# Patient Record
Sex: MALE | Race: WHITE | NOT HISPANIC OR LATINO | Employment: FULL TIME | URBAN - NONMETROPOLITAN AREA
[De-identification: names, ages, dates, MRNs, and addresses within clinical notes are randomized per-mention and may not be internally consistent; named-entity substitution may affect disease eponyms.]

---

## 2018-12-31 ENCOUNTER — APPOINTMENT (EMERGENCY)
Dept: RADIOLOGY | Facility: HOSPITAL | Age: 43
End: 2018-12-31
Payer: COMMERCIAL

## 2018-12-31 ENCOUNTER — HOSPITAL ENCOUNTER (EMERGENCY)
Facility: HOSPITAL | Age: 43
End: 2018-12-31
Attending: EMERGENCY MEDICINE | Admitting: EMERGENCY MEDICINE
Payer: COMMERCIAL

## 2018-12-31 ENCOUNTER — APPOINTMENT (EMERGENCY)
Dept: CT IMAGING | Facility: HOSPITAL | Age: 43
End: 2018-12-31
Payer: COMMERCIAL

## 2018-12-31 ENCOUNTER — HOSPITAL ENCOUNTER (INPATIENT)
Facility: HOSPITAL | Age: 43
LOS: 3 days | Discharge: HOME | DRG: 045 | End: 2019-01-03
Attending: INTERNAL MEDICINE | Admitting: INTERNAL MEDICINE
Payer: COMMERCIAL

## 2018-12-31 VITALS
DIASTOLIC BLOOD PRESSURE: 108 MMHG | HEART RATE: 80 BPM | RESPIRATION RATE: 18 BRPM | SYSTOLIC BLOOD PRESSURE: 156 MMHG | WEIGHT: 202.6 LBS | TEMPERATURE: 97.8 F | OXYGEN SATURATION: 95 %

## 2018-12-31 DIAGNOSIS — Z72.0 TOBACCO ABUSE: ICD-10-CM

## 2018-12-31 DIAGNOSIS — I63.9 STROKE (HCC): Primary | ICD-10-CM

## 2018-12-31 DIAGNOSIS — I63.9 ISCHEMIC STROKE (HCC): ICD-10-CM

## 2018-12-31 DIAGNOSIS — R29.898 UPPER EXTREMITY WEAKNESS: ICD-10-CM

## 2018-12-31 DIAGNOSIS — R47.1 DYSARTHRIA: Primary | ICD-10-CM

## 2018-12-31 PROBLEM — F10.10 ALCOHOL ABUSE: Status: ACTIVE | Noted: 2018-12-31

## 2018-12-31 PROBLEM — K21.9 GASTROESOPHAGEAL REFLUX DISEASE WITHOUT ESOPHAGITIS: Status: ACTIVE | Noted: 2018-12-31

## 2018-12-31 PROBLEM — G45.9 TIA (TRANSIENT ISCHEMIC ATTACK): Status: ACTIVE | Noted: 2018-12-31

## 2018-12-31 LAB
ABO GROUP BLD: NORMAL
ANION GAP BLD CALC-SCNC: 19 MMOL/L (ref 4–13)
ANION GAP SERPL CALCULATED.3IONS-SCNC: 9 MMOL/L (ref 4–13)
APTT PPP: 26 SECONDS (ref 26–38)
BASOPHILS # BLD AUTO: 0.06 THOUSANDS/ΜL (ref 0–0.1)
BASOPHILS NFR BLD AUTO: 1 % (ref 0–1)
BLD GP AB SCN SERPL QL: NEGATIVE
BUN BLD-MCNC: 10 MG/DL (ref 5–25)
BUN SERPL-MCNC: 12 MG/DL (ref 5–25)
CA-I BLD-SCNC: 1.22 MMOL/L (ref 1.12–1.32)
CALCIUM SERPL-MCNC: 8.9 MG/DL (ref 8.3–10.1)
CHLORIDE BLD-SCNC: 106 MMOL/L (ref 100–108)
CHLORIDE SERPL-SCNC: 107 MMOL/L (ref 100–108)
CO2 SERPL-SCNC: 24 MMOL/L (ref 21–32)
CREAT BLD-MCNC: 1.3 MG/DL (ref 0.6–1.3)
CREAT SERPL-MCNC: 1.35 MG/DL (ref 0.6–1.3)
EOSINOPHIL # BLD AUTO: 0.11 THOUSAND/ΜL (ref 0–0.61)
EOSINOPHIL NFR BLD AUTO: 1 % (ref 0–6)
ERYTHROCYTE [DISTWIDTH] IN BLOOD BY AUTOMATED COUNT: 13.2 % (ref 11.6–15.1)
GFR SERPL CREATININE-BSD FRML MDRD: 64 ML/MIN/1.73SQ M
GFR SERPL CREATININE-BSD FRML MDRD: 67 ML/MIN/1.73SQ M
GLUCOSE SERPL-MCNC: 106 MG/DL (ref 65–140)
GLUCOSE SERPL-MCNC: 107 MG/DL (ref 65–140)
GLUCOSE SERPL-MCNC: 93 MG/DL (ref 65–140)
HCT VFR BLD AUTO: 48.1 % (ref 36.5–49.3)
HCT VFR BLD CALC: 48 % (ref 36.5–49.3)
HGB BLD-MCNC: 16.6 G/DL (ref 12–17)
HGB BLDA-MCNC: 16.3 G/DL (ref 12–17)
IMM GRANULOCYTES # BLD AUTO: 0.02 THOUSAND/UL (ref 0–0.2)
IMM GRANULOCYTES NFR BLD AUTO: 0 % (ref 0–2)
INR PPP: 1.07 (ref 0.86–1.17)
LYMPHOCYTES # BLD AUTO: 2 THOUSANDS/ΜL (ref 0.6–4.47)
LYMPHOCYTES NFR BLD AUTO: 21 % (ref 14–44)
MAGNESIUM SERPL-MCNC: 1.9 MG/DL (ref 1.6–2.6)
MCH RBC QN AUTO: 31.4 PG (ref 26.8–34.3)
MCHC RBC AUTO-ENTMCNC: 34.5 G/DL (ref 31.4–37.4)
MCV RBC AUTO: 91 FL (ref 82–98)
MONOCYTES # BLD AUTO: 0.77 THOUSAND/ΜL (ref 0.17–1.22)
MONOCYTES NFR BLD AUTO: 8 % (ref 4–12)
NEUTROPHILS # BLD AUTO: 6.53 THOUSANDS/ΜL (ref 1.85–7.62)
NEUTS SEG NFR BLD AUTO: 69 % (ref 43–75)
NRBC BLD AUTO-RTO: 0 /100 WBCS
PCO2 BLD: 22 MMOL/L (ref 21–32)
PHOSPHATE SERPL-MCNC: 2.3 MG/DL (ref 2.7–4.5)
PLATELET # BLD AUTO: 209 THOUSANDS/UL (ref 149–390)
PMV BLD AUTO: 9.9 FL (ref 8.9–12.7)
POTASSIUM BLD-SCNC: 3.9 MMOL/L (ref 3.5–5.3)
POTASSIUM SERPL-SCNC: 3.9 MMOL/L (ref 3.5–5.3)
PROTHROMBIN TIME: 13.4 SECONDS (ref 11.8–14.2)
RBC # BLD AUTO: 5.28 MILLION/UL (ref 3.88–5.62)
RH BLD: POSITIVE
SODIUM BLD-SCNC: 142 MMOL/L (ref 136–145)
SODIUM SERPL-SCNC: 140 MMOL/L (ref 136–145)
SPECIMEN EXPIRATION DATE: NORMAL
SPECIMEN SOURCE: ABNORMAL
T4 FREE SERPL-MCNC: 1.32 NG/DL (ref 0.76–1.46)
TROPONIN I SERPL-MCNC: 0.02 NG/ML
TSH SERPL DL<=0.05 MIU/L-ACNC: 0.37 UIU/ML (ref 0.36–3.74)
WBC # BLD AUTO: 9.49 THOUSAND/UL (ref 4.31–10.16)

## 2018-12-31 PROCEDURE — 80048 BASIC METABOLIC PNL TOTAL CA: CPT | Performed by: EMERGENCY MEDICINE

## 2018-12-31 PROCEDURE — 86901 BLOOD TYPING SEROLOGIC RH(D): CPT | Performed by: EMERGENCY MEDICINE

## 2018-12-31 PROCEDURE — 70498 CT ANGIOGRAPHY NECK: CPT

## 2018-12-31 PROCEDURE — 82948 REAGENT STRIP/BLOOD GLUCOSE: CPT

## 2018-12-31 PROCEDURE — 85610 PROTHROMBIN TIME: CPT | Performed by: EMERGENCY MEDICINE

## 2018-12-31 PROCEDURE — 84484 ASSAY OF TROPONIN QUANT: CPT | Performed by: EMERGENCY MEDICINE

## 2018-12-31 PROCEDURE — 82272 OCCULT BLD FECES 1-3 TESTS: CPT

## 2018-12-31 PROCEDURE — 99245 OFF/OP CONSLTJ NEW/EST HI 55: CPT | Performed by: PSYCHIATRY & NEUROLOGY

## 2018-12-31 PROCEDURE — 86850 RBC ANTIBODY SCREEN: CPT | Performed by: EMERGENCY MEDICINE

## 2018-12-31 PROCEDURE — 96374 THER/PROPH/DIAG INJ IV PUSH: CPT

## 2018-12-31 PROCEDURE — 84439 ASSAY OF FREE THYROXINE: CPT | Performed by: EMERGENCY MEDICINE

## 2018-12-31 PROCEDURE — 84100 ASSAY OF PHOSPHORUS: CPT | Performed by: EMERGENCY MEDICINE

## 2018-12-31 PROCEDURE — 99285 EMERGENCY DEPT VISIT HI MDM: CPT

## 2018-12-31 PROCEDURE — 70496 CT ANGIOGRAPHY HEAD: CPT

## 2018-12-31 PROCEDURE — 71045 X-RAY EXAM CHEST 1 VIEW: CPT

## 2018-12-31 PROCEDURE — 85730 THROMBOPLASTIN TIME PARTIAL: CPT | Performed by: EMERGENCY MEDICINE

## 2018-12-31 PROCEDURE — 85014 HEMATOCRIT: CPT

## 2018-12-31 PROCEDURE — 86900 BLOOD TYPING SEROLOGIC ABO: CPT | Performed by: EMERGENCY MEDICINE

## 2018-12-31 PROCEDURE — 84443 ASSAY THYROID STIM HORMONE: CPT | Performed by: EMERGENCY MEDICINE

## 2018-12-31 PROCEDURE — 70450 CT HEAD/BRAIN W/O DYE: CPT

## 2018-12-31 PROCEDURE — 93005 ELECTROCARDIOGRAM TRACING: CPT

## 2018-12-31 PROCEDURE — 99291 CRITICAL CARE FIRST HOUR: CPT | Performed by: NURSE PRACTITIONER

## 2018-12-31 PROCEDURE — 80047 BASIC METABLC PNL IONIZED CA: CPT

## 2018-12-31 PROCEDURE — 85025 COMPLETE CBC W/AUTO DIFF WBC: CPT | Performed by: EMERGENCY MEDICINE

## 2018-12-31 PROCEDURE — 83735 ASSAY OF MAGNESIUM: CPT | Performed by: EMERGENCY MEDICINE

## 2018-12-31 RX ORDER — ATORVASTATIN CALCIUM 40 MG/1
40 TABLET, FILM COATED ORAL EVERY EVENING
Status: DISCONTINUED | OUTPATIENT
Start: 2018-12-31 | End: 2019-01-03

## 2018-12-31 RX ORDER — NICOTINE 21 MG/24HR
1 PATCH, TRANSDERMAL 24 HOURS TRANSDERMAL DAILY
Status: DISCONTINUED | OUTPATIENT
Start: 2019-01-01 | End: 2019-01-03 | Stop reason: HOSPADM

## 2018-12-31 RX ORDER — SENNOSIDES 8.6 MG
1 TABLET ORAL DAILY
Status: DISCONTINUED | OUTPATIENT
Start: 2019-01-01 | End: 2019-01-03 | Stop reason: HOSPADM

## 2018-12-31 RX ORDER — PANTOPRAZOLE SODIUM 40 MG/1
40 TABLET, DELAYED RELEASE ORAL
Status: DISCONTINUED | OUTPATIENT
Start: 2019-01-01 | End: 2019-01-03 | Stop reason: HOSPADM

## 2018-12-31 RX ORDER — THIAMINE MONONITRATE (VIT B1) 100 MG
100 TABLET ORAL DAILY
Status: DISCONTINUED | OUTPATIENT
Start: 2019-01-01 | End: 2019-01-03 | Stop reason: HOSPADM

## 2018-12-31 RX ORDER — LABETALOL HYDROCHLORIDE 5 MG/ML
10 INJECTION, SOLUTION INTRAVENOUS ONCE
Status: COMPLETED | OUTPATIENT
Start: 2018-12-31 | End: 2018-12-31

## 2018-12-31 RX ORDER — DOCUSATE SODIUM 100 MG/1
100 CAPSULE, LIQUID FILLED ORAL 2 TIMES DAILY
Status: DISCONTINUED | OUTPATIENT
Start: 2018-12-31 | End: 2019-01-03 | Stop reason: HOSPADM

## 2018-12-31 RX ORDER — OMEPRAZOLE 20 MG/1
20 CAPSULE, DELAYED RELEASE ORAL DAILY
COMMUNITY

## 2018-12-31 RX ORDER — LABETALOL HYDROCHLORIDE 5 MG/ML
INJECTION, SOLUTION INTRAVENOUS
Status: COMPLETED
Start: 2018-12-31 | End: 2018-12-31

## 2018-12-31 RX ORDER — SODIUM CHLORIDE 9 MG/ML
50 INJECTION, SOLUTION INTRAVENOUS CONTINUOUS
Status: DISCONTINUED | OUTPATIENT
Start: 2018-12-31 | End: 2019-01-01

## 2018-12-31 RX ORDER — FOLIC ACID 1 MG/1
1 TABLET ORAL DAILY
Status: DISCONTINUED | OUTPATIENT
Start: 2019-01-01 | End: 2019-01-03 | Stop reason: HOSPADM

## 2018-12-31 RX ORDER — LABETALOL HYDROCHLORIDE 5 MG/ML
10 INJECTION, SOLUTION INTRAVENOUS ONCE
Status: DISCONTINUED | OUTPATIENT
Start: 2018-12-31 | End: 2018-12-31 | Stop reason: HOSPADM

## 2018-12-31 RX ADMIN — SODIUM CHLORIDE 50 ML/HR: 0.9 INJECTION, SOLUTION INTRAVENOUS at 20:37

## 2018-12-31 RX ADMIN — ALTEPLASE 8.3 MG: KIT at 14:55

## 2018-12-31 RX ADMIN — LABETALOL HYDROCHLORIDE 10 MG: 5 INJECTION, SOLUTION INTRAVENOUS at 15:00

## 2018-12-31 RX ADMIN — ALTEPLASE 74.4 MG: KIT at 14:55

## 2018-12-31 RX ADMIN — IOHEXOL 100 ML: 350 INJECTION, SOLUTION INTRAVENOUS at 14:28

## 2018-12-31 RX ADMIN — LABETALOL HYDROCHLORIDE 10 MG: 5 INJECTION, SOLUTION INTRAVENOUS at 14:38

## 2018-12-31 NOTE — ED PROVIDER NOTES
History  Chief Complaint   Patient presents with    Slurred Speech     slurred speech with left sided numbness and heaviness at 1000 this am  went away, slurred speech came back in ambulance on way here  hx of TIAs  Carly Gillespie STROKE Alert     36 y/o male with hx TIA 7 yr prior consisting of dysarthria and left upper extremity/left lower extremity weakness presents now with onset of dysarthria/left perioral facial droop/left upper+left lower extremity weakness beginning at 1330 today  Had similar episode in same distribution at 1000 today lasting approximately 45 minutes with complete resolution and subsequently at 1245 today lasting approximately 15 minutes with full resolution  Patient also reports similar episode occurring approximately 2 weeks ago in which he had approximately 20 minutes episode of left upper extremity weakness that also resolved  States he was not supposed to be on any medications following his previous TIA  States he does not take any medications presently including any anticoagulant medications  States that he did fall while ascending stairs at approximately 1030 this morning due to weakness in the left lower extremity but denies striking his head or losing consciousness  He describes it as tripping and denies head/chest/abdominal injury from fall  Otherwise denies headache/blurred vision/double vision/PERRLA nausea/vomiting/chest pain/dyspnea/extremity paresthesias/vertigo/syncope  Did take aspirin 81 mg at 1130 this morning d/t sx  Patient seen immediately upon arrival; upon my evaluation, it is obvious that he is having active neurologic symptoms with a defined neurologic distribution  He was activated as a stroke alert  Will obtain emergent consultation with neurologist and emergent CT head/CTA head plus neck  History provided by:  EMS personnel, patient and significant other      Prior to Admission Medications   Prescriptions Last Dose Informant Patient Reported? Taking? omeprazole (PriLOSEC) 20 mg delayed release capsule  Self Yes Yes   Sig: Take 20 mg by mouth daily      Facility-Administered Medications: None       Past Medical History:   Diagnosis Date    GERD (gastroesophageal reflux disease)     TIA (transient ischemic attack)        History reviewed  No pertinent surgical history  History reviewed  No pertinent family history  I have reviewed and agree with the history as documented  Social History   Substance Use Topics    Smoking status: Current Every Day Smoker     Packs/day: 2 00     Types: Cigarettes    Smokeless tobacco: Never Used    Alcohol use Yes        Review of Systems   Constitutional: Negative for chills, fatigue and fever  Eyes: Negative for pain and visual disturbance  Respiratory: Negative for cough and shortness of breath  Cardiovascular: Negative for chest pain and palpitations  Gastrointestinal: Negative for abdominal pain, nausea and vomiting  Skin: Negative for color change, pallor, rash and wound  Neurological: Positive for speech difficulty and weakness  Negative for dizziness, light-headedness, numbness and headaches  Hematological: Negative for adenopathy  Does not bruise/bleed easily  All other systems reviewed and are negative  Physical Exam  Physical Exam   Constitutional: He is oriented to person, place, and time  Vital signs are normal  He appears well-developed and well-nourished  He is active and cooperative  No distress  HENT:   Head: Normocephalic and atraumatic  Right Ear: Hearing and external ear normal    Left Ear: Hearing and external ear normal    Nose: Nose normal    Mouth/Throat: Uvula is midline, oropharynx is clear and moist and mucous membranes are normal  No oropharyngeal exudate  Eyes: Pupils are equal, round, and reactive to light  Conjunctivae, EOM and lids are normal    Neck: Trachea normal, normal range of motion and phonation normal  Neck supple  No JVD present   No tracheal tenderness, no spinous process tenderness and no muscular tenderness present  No tracheal deviation present  No thyroid mass and no thyromegaly present  Cardiovascular: Normal rate, regular rhythm, S1 normal, S2 normal, normal heart sounds and intact distal pulses  Exam reveals no gallop and no friction rub  No murmur heard  Pulses:       Radial pulses are 2+ on the right side, and 2+ on the left side  Dorsalis pedis pulses are 2+ on the right side, and 2+ on the left side  Posterior tibial pulses are 2+ on the right side, and 2+ on the left side  Pulmonary/Chest: Effort normal and breath sounds normal  No stridor  No respiratory distress  He has no decreased breath sounds  He has no wheezes  He has no rhonchi  He has no rales  He exhibits no tenderness  Abdominal: Soft  He exhibits no distension and no mass  There is no tenderness  There is no rigidity, no rebound, no guarding and no CVA tenderness  Musculoskeletal: Normal range of motion  He exhibits no edema, tenderness or deformity  Lymphadenopathy:     He has no cervical adenopathy  Neurological: He is alert and oriented to person, place, and time  A cranial nerve deficit (See notes) is present  No sensory deficit  He exhibits abnormal muscle tone (See notes )  GCS eye subscore is 4  GCS verbal subscore is 5  GCS motor subscore is 6  PERRLA; EOMI  Sensation intact to light touch over face in V1-V3 distribution bilaterally  There is a left-sided perioral facial droop  Tongue/uvula midline  Shoulder shrug equal bilaterally  Strength 5/5 in RUE/RLE; 4+/5 in LUE at elbow/wrist with 5/5 in LLE at hip and 4+/5 in LLE at ankle  Sensation intact to UE/LE bilaterally to sharp/dull sensation  Skin: Skin is warm, dry and intact  No rash noted  He is not diaphoretic  No erythema  Psychiatric: He has a normal mood and affect  His behavior is normal  His speech is slurred (moderate dysarthria is present)     Nursing note and vitals reviewed  Vital Signs  ED Triage Vitals [12/31/18 1405]   Temperature Pulse Respirations Blood Pressure SpO2   97 8 °F (36 6 °C) 76 18 168/96 95 %      Temp Source Heart Rate Source Patient Position - Orthostatic VS BP Location FiO2 (%)   Temporal Monitor Lying Right arm --      Pain Score       --           Vitals:    12/31/18 1500 12/31/18 1515 12/31/18 1530 12/31/18 1545   BP: (!) 171/96 (!) 152/104 (!) 166/104 151/86   Pulse: 68 65 69 67   Patient Position - Orthostatic VS: Lying Lying Lying Lying       Visual Acuity  Visual Acuity      Most Recent Value   L Pupil Size (mm)  3   R Pupil Size (mm)  3          ED Medications  Medications   iohexol (OMNIPAQUE) 350 MG/ML injection (SINGLE-DOSE) 100 mL (100 mL Intravenous Given 12/31/18 1428)   labetalol (NORMODYNE) injection 10 mg (10 mg Intravenous Given 12/31/18 1438)   alteplase (ACTIVASE) bolus 8 3 mg (8 3 mg Intravenous Given 12/31/18 1455)   alteplase (ACTIVASE) infusion 74 4 mg (74 4 mg Intravenous New Bag 12/31/18 1455)   labetalol (NORMODYNE) injection 10 mg (10 mg Intravenous Given 12/31/18 1500)       Diagnostic Studies  Results Reviewed     Procedure Component Value Units Date/Time    Phosphorus [[id number]]  (Abnormal) Collected:  12/31/18 1423    Lab Status:  Final result Specimen:  Blood from Arm, Right Updated:  12/31/18 1542     Phosphorus 2 3 (L) mg/dL     TSH [[id number]]  (Normal) Collected:  12/31/18 1423    Lab Status:  Final result Specimen:  Blood from Arm, Right Updated:  12/31/18 1542     TSH 3RD GENERATON 0 366 uIU/mL     Narrative:         Patients undergoing fluorescein dye angiography may retain small amounts of fluorescein in the body for 48-72 hours post procedure  Samples containing fluorescein can produce falsely depressed TSH values  If the patient had this procedure,a specimen should be resubmitted post fluorescein clearance  T4, free [[id number]] Collected:  12/31/18 1423    Lab Status:   In process Specimen:  Blood from Arm, Right Updated:  12/31/18 1517    POCT Chem 8+ [[id number]]  (Abnormal) Collected:  12/31/18 1415    Lab Status:  Final result Updated:  12/31/18 1501     SODIUM, I-STAT 142 mmol/l      Potassium, i-STAT 3 9 mmol/L      Chloride, istat 106 mmol/L      CO2, i-STAT 22 mmol/L      Anion Gap, i-STAT 19 (H) mmol/L      Calcium, Ionized i-STAT 1 22 mmol/L      BUN, I-STAT 10 mg/dl      Creatinine, i-STAT 1 3 mg/dl      eGFR 67 ml/min/1 73sq m      Glucose, i-STAT 107 mg/dl      Hct, i-STAT 48 %      Hgb, i-STAT 16 3 g/dl      Specimen Type VENOUS    Basic metabolic panel [[id number]]  (Abnormal) Collected:  12/31/18 1423    Lab Status:  Final result Specimen:  Blood from Arm, Right Updated:  12/31/18 1446     Sodium 140 mmol/L      Potassium 3 9 mmol/L      Chloride 107 mmol/L      CO2 24 mmol/L      ANION GAP 9 mmol/L      BUN 12 mg/dL      Creatinine 1 35 (H) mg/dL      Glucose 106 mg/dL      Calcium 8 9 mg/dL      eGFR 64 ml/min/1 73sq m     Narrative:         National Kidney Disease Education Program recommendations are as follows:  GFR calculation is accurate only with a steady state creatinine  Chronic Kidney disease less than 60 ml/min/1 73 sq  meters  Kidney failure less than 15 ml/min/1 73 sq  meters      Magnesium [[id number]]  (Normal) Collected:  12/31/18 1423    Lab Status:  Final result Specimen:  Blood from Arm, Right Updated:  12/31/18 1446     Magnesium 1 9 mg/dL     Troponin I [[id number]]  (Normal) Collected:  12/31/18 1423    Lab Status:  Final result Specimen:  Blood from Arm, Right Updated:  12/31/18 1446     Troponin I 0 02 ng/mL     Protime-INR [[id number]]  (Normal) Collected:  12/31/18 1423    Lab Status:  Final result Specimen:  Blood from Arm, Right Updated:  12/31/18 1438     Protime 13 4 seconds      INR 1 07    APTT [[id number]]  (Normal) Collected:  12/31/18 1423    Lab Status:  Final result Specimen:  Blood from Arm, Right Updated:  12/31/18 1438     PTT 26 seconds     Fingerstick Glucose (POCT) [[id number]]  (Normal) Collected:  12/31/18 1437    Lab Status:  Final result Updated:  12/31/18 1438     POC Glucose 93 mg/dl     CBC and differential [[id number]] Collected:  12/31/18 1423    Lab Status:  Final result Specimen:  Blood from Arm, Right Updated:  12/31/18 1428     WBC 9 49 Thousand/uL      RBC 5 28 Million/uL      Hemoglobin 16 6 g/dL      Hematocrit 48 1 %      MCV 91 fL      MCH 31 4 pg      MCHC 34 5 g/dL      RDW 13 2 %      MPV 9 9 fL      Platelets 687 Thousands/uL      nRBC 0 /100 WBCs      Neutrophils Relative 69 %      Immat GRANS % 0 %      Lymphocytes Relative 21 %      Monocytes Relative 8 %      Eosinophils Relative 1 %      Basophils Relative 1 %      Neutrophils Absolute 6 53 Thousands/µL      Immature Grans Absolute 0 02 Thousand/uL      Lymphocytes Absolute 2 00 Thousands/µL      Monocytes Absolute 0 77 Thousand/µL      Eosinophils Absolute 0 11 Thousand/µL      Basophils Absolute 0 06 Thousands/µL                  X-ray chest 1 view portable   ED Interpretation by Nader Dai DO (12/31 1435)   Airway is midline  Lungs are clear bilaterally with no evidence of pulmonary vascular congestion/focal infiltrate/pleural effusion//pneumothorax  Cardiac and mediastinal silhouettes are within normal limits  Osseous structures appear normal       Final Result by Preston Brady MD (12/31 0942)      Normal chest       Note: I agree with the preliminary interpretation by Dr Arabella Wong documented in [address] [place] [address] Rd  Workstation performed: [id number]         CTA stroke alert (head/neck)   Final Result by Hira Garcia MD (12/31 4824)      1  Patent major cervical and intracranial arteries without critical stenosis  No aneurysm or AV malformation  2   Peripherally distributed centrilobular nodular and small patchy groundglass opacities in the visualized upper lungs may represent bronchiolitis of infectious/inflammatory etiology              Findings were directly discussed with Alix Chery on 12/31/2018 2:45 PM                      Workstation performed: [id number]         CT stroke alert brain   Final Result by Cedric Robert MD (12/31 0813)      1  No acute intracranial CT abnormality      2  Old lacunar infarcts in the left basal ganglia and right thalamus  Also hypoattenuating area in the right corona radiata most likely sequela of subacute/chronic infarct  Findings were directly discussed with Alix Chery on 12/31/2018 2:45 PM       Workstation performed: [id number]                    Procedures  ECG 12 Lead Documentation  Date/Time: 12/31/2018 2:45 PM  Performed by: Isis Lovett  Authorized by: Isis Lovett     Indications / Diagnosis:  CVA  ECG reviewed by me, the ED Provider: yes    Patient location:  ED  Previous ECG:     Previous ECG:  Unavailable    Comparison to cardiac monitor: Yes    Interpretation:     Interpretation: abnormal    Rate:     ECG rate:  64    ECG rate assessment: normal    Rhythm:     Rhythm: sinus rhythm    Ectopy:     Ectopy: none    QRS:     QRS axis:  Normal    QRS intervals:  Normal  Conduction:     Conduction: normal    ST segments:     ST segments:  Normal  T waves:     T waves: normal    Q waves:     Q waves:  II, III, aVF, I and aVL  Comments:      Pr 124 qrs 92 qtc 476           Phone Contacts  ED Phone Contact    ED Course  ED Course as of Dec 31 1653   Mon Dec 31, 2018   1417 D/w neurologist Dr Lakhwinder Jaquez  Patient case reviewed: he will evaluate CT images and evaluate patient via telemedicine connection upon return from CT  Anticipates that patient will be a TPA candidate given nature of sx--requests that I order TPA assuming CT does not demonstrate ICH while he is evaluating patient  1430 CTs completed and patient returned to room  D/w Dr Lakhwinder Jaquez: he will evaluate patient via telemedicine cart  He has reviewed CT: no evidence of ICH  CTA images have not all finished compiling as of yet   Recommends ordering TPA and he will call back to ED in turn  [address] [place]  D/w radiologist Dr Robe Huddleston  CT head: no evidence of ICH  Possible hypodensity in L basal ganglia  CTA head/neck: no large vessel stenosis or occlusion  Nodularity in EKTA of lung (infectious/inflammatory)  1449 After telemedicine consultation with Dr Remi Beck, patient was apprised of likely diagnosis of acute ischemic stroke and option of TPA therapy  Risks/benefits of treatment were discussed with the patient by Dr Remi Beck and by me; patient is in my judgment capable of understanding/weighing the risks/benefits, making a choice regarding his treatment, and communicating that choice  TPA contraindications sheet was reviewed: there are no absolute contraindications to TPA administration  The only relative contraindication present is the hx of CVD (consisting of previous TIA); there is no hx of ICH  He agreed to [facility] therapy  Signed consent documenting this was obtained  TPA administration started at 1455  Patient also apprised of need for transfer for critical care/monitoring/etc  Transfer order placed to [facility] at 1458     1455 1  WBC wnl   2  Hg/Hct wnl   3  Plt wnl   4  Electrolytes wnl   5  PTT/INR wnl   6  Troponin nonzero but within reference range  I contacted PAC regarding transfer; [facility] critical [facility] but patient can be transferred to [address] as he has no neurosurgical requirements  53-69-10-18 Patient remains awake/alert with GCS 15  PERRLA; EOMI  Left-sided perioral facial droop similar to prior  No paresthesias/hypoesthesia in either UE/LE  Strength 4/5 in LUE/LLE and 5/5 in RUE/RLE  There is notable improvement in dysarthria compared to prior  D/w Dr Kailee Morgan of [facility] critical care  Patient case discussed  Accepts in transfer to [facility]; PAC to arrange transfer  [id number] EMS in ED to transfer patient  1639 Patient transferred from ED via EMS without issue            HEART Risk Score      Most Recent Value   History  0 Filed at: 12/31/2018 1504   ECG  0 Filed at: 12/31/2018 1504   Age  0 Filed at: 12/31/2018 1504   Risk Factors  1 Filed at: 12/31/2018 1504   Troponin  0 Filed at: 12/31/2018 1504   Heart Score Risk Calculator   History  0 Filed at: 12/31/2018 1504   ECG  0 Filed at: 12/31/2018 1504   Age  0 Filed at: 12/31/2018 1504   Risk Factors  1 Filed at: 12/31/2018 1504   Troponin  0 Filed at: 12/31/2018 1504   HEART Score  1 Filed at: 12/31/2018 1504   HEART Score  1 Filed at: 12/31/2018 8001 [address]                Stroke Assessment     Row Name 12/31/18 1409             NIH Stroke Scale    Interval Baseline      Level of Consciousness (1a ) 0      LOC Questions (1b ) 0      LOC Commands (1c ) 0      Best Gaze (2 ) 0      Visual (3 ) 0      Facial Palsy (4 ) 2      Motor Arm, Left (5a ) 1      Motor Arm, Right (5b ) 0      Motor Leg, Left (6a ) 1      Motor Leg, Right (6b ) 0      Limb Ataxia (7 ) 0      Sensory (8 ) 0      Best Language (9 ) 0      Dysarthria (10 ) 1      Extinction and Inattention (11 ) (Formerly Neglect) 0      Total 5                First Filed Value   TPA Decision  TPA given this admission  After a discussion of risks and benefits reviewing inclusion and exclusion criteria the decision was made to proceed with thrombolytic therapy  Verbal consent was obtained from   TPA consent options  the patient            MDM  Number of Diagnoses or Management Options  Dysarthria: new and requires workup  Ischemic stroke [facility]): new and requires workup  Left upper and lower extremity weakness: new and requires workup     Amount and/or Complexity of Data Reviewed  Clinical lab tests: ordered and reviewed  Tests in the radiology section of CPT®: ordered and reviewed  Discussion of test results with the performing providers: yes  Decide to obtain previous medical records or to obtain history from someone other than the patient: yes  Obtain history from someone other than the patient: yes  Review and summarize past medical records: yes  Discuss the patient with other providers: yes  Independent visualization of images, tracings, or specimens: yes    Risk of Complications, Morbidity, and/or Mortality  Presenting problems: high  Diagnostic procedures: high  Management options: high    Patient Progress  Patient progress: stable    The patient presented with a condition in which there was a high probability of imminent or life-threatening deterioration, and critical care services (excluding separately billable procedures) totalled 30-74 minutes          Disposition  Final diagnoses:   Dysarthria   Left upper and lower extremity weakness   Ischemic stroke ([address] )     Time reflects when diagnosis was documented in both MDM as applicable and the Disposition within this note     Time User Action Codes Description Comment    12/31/2018  2:07 PM Courtney Walkeriss Add [R47 1] Dysarthria     12/31/2018  2:59 PM Courtney Washington Add [R29 898] Upper extremity weakness     12/31/2018  3:00 PM Samia Whitten [U21 148] Left upper and lower extremity weakness     12/31/2018  3:00 PM Courtney Bliss Add [I63 9] Ischemic stroke [facility])       ED Disposition     ED Disposition Condition Comment    Transfer to Another [address] should be transferred out to [facility] under care of Dr Trinh Mcfarlane       MD Documentation      Most Recent Value   Patient Condition  The patient has been stabilized such that within reasonable medical probability, no material deterioration of the patient condition or the condition of the unborn child(brando) is likely to result from the transfer   Reason for Transfer  Level of Care needed not available at this facility [critical care/neurology]   Benefits of Transfer  Specialized equipment and/or services available at the receiving facility (Include comment)________________________ Tay Jimenez care/neurology]   Risks of Transfer  Potential for delay in receiving treatment, Other: (Include comment)__________________________, Potential deterioration of medical condition, Loss of IV, Increased discomfort during transfer, Possible worsening of condition or death during transfer [motor vehicle collision]   Accepting Physician  Dr Gemma Barahona NameNany [place]   Sending MD Dr Chris Cottrell   Provider Certification  General risk, such as traffic hazards, adverse weather conditions, rough terrain or turbulence, possible failure of equipment (including vehicle or aircraft), or consequences of actions of persons outside the control of the transport personnel      RN Documentation      Most [facility] Name, Nany Coelho      Follow-up Information    None         Patient's Medications   Discharge Prescriptions    No medications on file     No discharge procedures on file      ED Provider  Electronically Signed by           Tosha Martinez DO  12/31/18 8252

## 2018-12-31 NOTE — LETTER
[address]  Dept: [phone]    January 3, 2019     Patient: Venus Zurita   YOB: 1975   Date of Visit: 12/31/2018       To Whom it May Concern:    Venus Zurita is under my professional care  He was seen in the hospital from 12/31/2018   to 01/03/19  He may return to work on 1/7/2019 with no restrictions  If you have any questions or concerns, please don't hesitate to call           Sincerely,          Donita Vides, DO

## 2018-12-31 NOTE — EMTALA/ACUTE CARE TRANSFER
[address] [place] [address]  Dept: [address] TRANSFER CONSENT    NAME Kari Real                                         1975                              MRN [id number]    I have been informed of my rights regarding examination, treatment, and transfer   by Dr Demarcus Cole DO    Benefits: Specialized equipment and/or services available at the receiving facility (Include comment)________________________ (critical care/neurology)    Risks: Potential for delay in receiving treatment, Other: (Include comment)__________________________, Potential deterioration of medical condition, Loss of IV, Increased discomfort during transfer, Possible worsening of condition or death during transfer (motor vehicle collision)      Consent for Transfer:  I acknowledge that my medical condition has been evaluated and explained to me by the emergency department physician or other qualified medical person and/or my attending physician, who has recommended that I be transferred to the service of  Accepting Physician: Dr Marysol Nix at [address] [place] [address] Name, [address] : [facility]; [facility]  The above potential benefits of such transfer, the potential risks associated with such transfer, and the probable risks of not being transferred have been explained to me, and I fully understand them  The doctor has explained that, in my case, the benefits of transfer outweigh the risks  I agree to be transferred  I authorize the performance of emergency medical procedures and treatments upon me in both transit and upon arrival at the receiving facility  Additionally, I authorize the release of any and all medical records to the receiving facility and request they be transported with me, if possible    I understand that the safest mode of transportation during a medical emergency is an ambulance and that the Hospital advocates the use of this mode of transport  Risks of traveling to the receiving facility by car, including absence of medical control, life sustaining equipment, such as oxygen, and medical personnel has been explained to me and I fully understand them  (HUMBERTO CORRECT BOX BELOW)  [  ]  I consent to the stated transfer and to be transported by ambulance/helicopter  [  ]  I consent to the stated transfer, but refuse transportation by ambulance and accept full responsibility for my transportation by car  I understand the risks of non-ambulance transfers and I exonerate the Hospital and its staff from any deterioration in my condition that results from this refusal     X___________________________________________    DATE  18  TIME________  Signature of patient or legally responsible individual signing on patient behalf           RELATIONSHIP TO PATIENT_________________________          Provider Certification    NAME Kari Real                                         1975                              MRN [id number]    A medical screening exam was performed on the above named patient  Based on the examination:    Condition Necessitating Transfer The primary encounter diagnosis was Dysarthria  Diagnoses of Left upper and lower extremity weakness and Ischemic stroke [facility]) were also pertinent to this visit      Patient Condition: The patient has been stabilized such that within reasonable medical probability, no material deterioration of the patient condition or the condition of the unborn child(brando) is likely to result from the transfer    Reason for Transfer: Level of Care needed not available at this facility (critical care/neurology)    Transfer Requirements: [facility]; [address] [place]   [address]    · Space available and qualified personnel available for treatment as acknowledged by    · Agreed to accept transfer and to provide appropriate medical treatment as acknowledged by       Dr Marysol Nix  · Appropriate medical records of the examination and treatment of the patient are provided at the time of transfer   [address] _______  · Transfer will be performed by qualified personnel from    and appropriate transfer equipment as required, including the use of necessary and appropriate life support measures  Provider Certification: I have examined the patient and explained the following risks and benefits of being transferred/refusing transfer to the patient/family:  General risk, such as traffic hazards, adverse weather conditions, rough terrain or turbulence, possible failure of equipment (including vehicle or aircraft), or consequences of actions of persons outside the control of the transport personnel      Based on these reasonable risks and benefits to the patient and/or the unborn child(brando), and based upon the information available at the time of the patients examination, I certify that the medical benefits reasonably to be expected from the provision of appropriate medical treatments at another medical facility outweigh the increasing risks, if any, to the individuals medical condition, and in the case of labor to the unborn child, from effecting the transfer      X____________________________________________ DATE 12/31/18        TIME_______      ORIGINAL - SEND TO MEDICAL RECORDS   COPY - SEND WITH PATIENT DURING TRANSFER

## 2018-12-31 NOTE — TELEMEDICINE
TeleConsultation - Stroke   Santos Myers 37 y o  male MRN: [id number]   Encounter: [id number]      REQUIRED DOCUMENTATION:     1  This service was provided via Telemedicine  2  Provider located at home  3  TeleMed provider: Denise Brock MD   4  Identify all parties in room with patient during tele consult:  ERINN Sanders  5  After connecting through [employer], patient was identified by name and date of birth and assistant checked wristband  Patient was then informed that this was a Telemedicine visit and that the exam was being conducted confidentially over secure lines  My office door was closed  No one else was in the room  Patient acknowledged consent and understanding of privacy and security of the Telemedicine visit, and gave us permission to have the assistant stay in the room in order to assist with the history and to conduct the exam   I informed the patient that I have reviewed their record in Epic and presented the opportunity for them to ask any questions regarding the visit today  The patient agreed to participate  Assessment/Plan   Assessment: Dysarthria with left hemiparesis    TPA Decision: TPA given this admission  After a discussion of risks and benefits reviewing inclusion and exclusion criteria the decision was made to proceed with thrombolytic therapy  Verbal consent was obtained from  the patient  CT head shows possible hypoattenuation of right internal capsule  CTA shows patent vessels  Plan:   Admit for stroke pathway  Maintain SBP< 147, Diastolic < 658  Hold off antiplatelets and anticoagulation for 24 hours  Repeat head imaging in 24 hours to exclude bleeding  MRI can suffice if done tomorrow  Otherwise, repeat CT head     Start Atorvastatin 80 mg   on smoking cessation  Telemetry, TTE, Lipid panel, TSH and hemoglobin A1c  PT/OT and speech therapy    History of Present Illness     Reason for Consult / Principal Problem: Dysarthria  Hx and PE limited by: none  Patient last known well: 1:30 pm  Stroke alert called: 2:13 pm  Neurology time of arrival: 2:13 pm (call back)  HPI: Wendy Reynoso is a 37 y o  male who presents with sudden onset dysarthria and left face, arm and leg weakness that started abruptly at 1:30 pm  He stated that this morning he had the symptoms started at 0900 am, but resolved spontaneously in 30 minutes, then they returned shortly and resolved again, and finally returned at 01:30 pm and have been constant since then  No headache, dizziness or diplopia  No recent illness  He had a TIA with similar symptoms 7 years ago, and he was not told an etiology for it  He takes only daily aspirin  Of note, patient has no other past medical history  He denies migraines  No family history of early strokes, CAD or venous thromboses  He smokes 2 PPD for 20 years  Consult to Neurology  Consult performed by: Lars Bah ordered by: Vira Maurice        Review of Systems  Complete ROS was done and is negative other than what is mentioned in HPI    Historical Information   Past Medical History:   Diagnosis Date    GERD (gastroesophageal reflux disease)     TIA (transient ischemic attack)      History reviewed  No pertinent surgical history  Social History   History   Alcohol Use    Yes     History   Drug Use No     History   Smoking Status    Current Every Day Smoker    Types: Cigarettes   Smokeless Tobacco    Never Used     Family History: non-contributory    Review of previous medical records was completed  Meds/Allergies   PTA meds:   None       No Known Allergies    Objective   Vitals:Blood pressure 168/96, pulse 75, temperature 97 8 °F (36 6 °C), temperature source Temporal, resp  rate 19, weight 91 9 kg (202 lb 9 6 oz), SpO2 95 %  ,There is no height or weight on file to calculate BMI  Physical Exam  NAD    Neurologic Exam Alert and oriented X3  Language intact  No neglect or apraxia  Speech is dysarthric moderately  EOMI  No visual field cut   Left facial weakness (UMN)  Left arm and leg drift  No dysmetria  Denies numbness    NIHSS:  1a Level of Consciousness: 0 = Alert   1b  LOC Questions: 0 = Answers both correctly   1c  LOC Commands: 0 = Obeys both correctly   2  Best Gaze: 0 = Normal   3  Visual: 0 = No visual field loss   4  Facial Palsy: 2=Partial paralysis (total or near total paralysis of the lower face)   5a  Motor Right Arm: 0=No drift, limb holds 90 (or 45) degrees for full 10 seconds   5b  Motor Left Arm: 1=Drift, limb holds 90 (or 45) degrees but drifts down before full 10 seconds: does not hit bed   6a  Motor Right Le=No drift, limb holds 90 (or 45) degrees for full 10 seconds   6b  Motor Left Le=Drift, limb holds 90 (or 45) degrees but drifts down before full 10 seconds: does not hit bed   7  Limb Ataxia:  0=Absent   8  Sensory: 0=Normal; no sensory loss   9  Best Language:  0=No aphasia, normal   10  Dysarthria: 1=Mild to moderate, patient slurs at least some words and at worst, can be understood with some difficulty   11  Extinction and Inattention (formerly Neglect): 0=No abnormality   Total Score: 5     Time NIHSS was completed: 2:40 pm    Lab Results: I have personally reviewed pertinent reports  Imaging Studies: I have personally reviewed pertinent films in PACS  EKG, Pathology, and Other Studies: I have personally reviewed pertinent reports

## 2018-12-31 NOTE — PLAN OF CARE
Activity Intolerance/Impaired Mobility     Mobility/activity is maintained at optimum level for patient Progressing        Communication Impairment     Ability to express needs and understand communication Alexander Rhoades Discharge to home or other facility with appropriate resources Progressing        Knowledge Deficit     Patient/family/caregiver demonstrates understanding of disease process, treatment plan, medications, and discharge instructions Progressing        Neurological Deficit     Neurological status is stable or improving Progressing        Nutrition     Nutrition/Hydration status is improving Progressing        Potential for Aspiration     Non-ventilated patient's risk of aspiration is minimized Progressing     Ventilated patient's risk of aspiration is minimized Progressing        Potential for Falls     Patient will remain free of falls Progressing        SAFETY ADULT     Maintain or return to baseline ADL function Progressing     Maintain or return mobility status to optimal level Progressing

## 2018-12-31 NOTE — H&P
History & Physical Exam - Critical Care   Sidney Maharaj 37 y o  male MRN: [id number]  Unit/Bed#: ICU 13 Encounter: [id number]      Assessment/Plan:  1  Stroke like symptoms, suspect ischemic stroke  · Will admit patient to critical care for closer monitoring, will require greater than 2 midnight hospitalization stay  · Continue on stroke pathway, received tPA at 1455  · Repeat head CT tomorrow  · MRI tonight  · Check echocardiogram  · Permissive hypertension goal systolic blood pressure 987-354'H  · Check lipid panel and hemoglobin a1c  · Will likely need to be placed on anticoagulation as an outpatient  · Consult neurology  2  Alcohol abuse  · Drinks approximately 6 pack/day but has no history of w/d symptoms  · Will place on thiamine and folic acid  3  Tobacco abuse  · Provide with Nicoderm patch  4  GERD  · Continue on protonix   5  GUNJAN, unsure of baseline  · Creatinine 1 3 but unclear what baseline level is  · Does not have previous blood work in system  · Will check BMP in AM and give gentle hydration of 50 ml/hr overnight    Critical Care Time: 39 minutes  Documented critical care time excludes any procedures documented elsewhere  It also excludes any family updates    _____________________________________________________________________      HPI:    Sidney Maharaj is a 37 y o  male who presents with a past medical history significant for TIA and GERD  Presented to the emergency department with dysarthria and left sided weakness  He stated that at 930-10 am began with left sided weakness that lasted approximately 45 minutes and had resolution  He had another episode around 1245 that lasted 15 minutes  He then began with dysarthria and left sided weakness that did not resolve and presented to the emergency department for evaluation  His NIHSS was 3 on admission to the ER and a stroke alert was called  Initial head CT was negative for acute findings and given tPA   He was transferred to [facility] for management after tPA  Patient has a history of TIA/Stroke 7 years ago at [address] but does not have any records in epic  He has not been on any statin therapy or anticoagulation  He does not follow up with neurology  Of note he did fall going up stairs today due to weakness but denies any trauma  Upon arrival to the ICU he continues with dysarthria, right sided facial droop, nystagmus and minimal right sided extremity weakness  Review of Systems:  Review of Systems   HENT: Negative  Eyes: Negative  Respiratory: Negative  Negative for shortness of breath  Cardiovascular: Negative  Negative for chest pain  Gastrointestinal: Negative  Negative for diarrhea, nausea and vomiting  Endocrine: Negative  Genitourinary: Negative  Negative for dysuria and urgency  Musculoskeletal: Negative  Neurological: Positive for speech difficulty and weakness (left arm weakness)  Facial droop and difficulty speaking     Hematological: Negative  Psychiatric/Behavioral: Negative  All other systems reviewed and are negative  Full 12 point review of systems was performed  Aside from what was mentioned in the HPI, it is otherwise negative  Historical Information   Past Medical History:   Diagnosis Date    GERD (gastroesophageal reflux disease)     TIA (transient ischemic attack)      History reviewed  No pertinent surgical history    Social History   History   Alcohol Use    25 2 oz/week    42 Cans of beer per week     Comment: 6 pack a day     History   Drug Use No     History   Smoking Status    Current Every Day Smoker    Packs/day: 2 00    Years: 25 00    Types: Cigarettes   Smokeless Tobacco    Never Used       Family History:   Family History   Problem Relation Age of Onset    No Known Problems Mother     No Known Problems Father        Medications:  Pertinent medications were reviewed    Current Facility-Administered Medications:  atorvastatin 40 mg Oral QPM Glenn King LAURIE Petit   docusate sodium 100 mg Oral BID Chase Laming, CRNP   [START ON 9/4/2178] folic acid 1 mg Oral Daily Allie LAURIE Fatima   [START ON 1/1/2019] nicotine 1 patch Transdermal Daily Nelda Laming, CRNP   [START ON 1/1/2019] pantoprazole 40 mg Oral Early Morning Nelda Laming, CRNP   [START ON 1/1/2019] senna 1 tablet Oral Daily LAURIE Arshad   sodium chloride 50 mL/hr Intravenous Continuous AllieLAURIE Chowdhury   [START ON 1/1/2019] thiamine 100 mg Oral Daily LAURIE Arshad         No Known Allergies      Vitals: There were no vitals taken for this visit  There is no height or weight on file to calculate BMI    ,    ,        No intake or output data in the 24 hours ending 12/31/18 1821  Invasive Devices     Peripheral Intravenous Line            Peripheral IV 12/31/18 Left Hand less than 1 day    Peripheral IV 12/31/18 Right Antecubital less than 1 day                Physical Exam:  Gen: Alert and oriented, NAD  HEENT: Pupils 4 and reactive, nystagmus with rapid eye movement, left sided facial droop and tongue deviation, o/p clear  Neck/lymph: Supple, no JVD, no adenopathy  Chest: CTA  Cor: RRR, no m/r/g  Abd: Soft, non-tender, non-distended, bowel sounds present  Ext: MENDOZA, +4/5 strength on left  Neuro: Follows commands, nystagmus, left arm and leg weakness +4/5 strength, left facial droop, left tongue deviation, dysarthria  Skin: Warm, dry, intact      Diagnostic Data:  Lab: I have personally reviewed pertinent lab results  ,   CBC:    Results from last 7 days  Lab Units 12/31/18  1423   WBC Thousand/uL 9 49   HEMOGLOBIN g/dL 16 6   HEMATOCRIT % 48 1   PLATELETS Thousands/uL 209      CMP: Lab Results   Component Value Date    SODIUM 140 12/31/2018    K 3 9 12/31/2018     12/31/2018    CO2 24 12/31/2018    CO2 22 12/31/2018    BUN 12 12/31/2018    CREATININE 1 35 (H) 12/31/2018    GLUCOSE 107 12/31/2018    CALCIUM 8 9 12/31/2018    EGFR 64 12/31/2018    EGFR 67 12/31/2018   ,   PT/INR:   Lab Results   Component Value Date    INR 1 07 12/31/2018   ,   Troponin:   Lab Results   Component Value Date    TROPONINI 0 02 12/31/2018   ,   Magnesium: No components found for: MAG,  Phosphorous:   Lab Results   Component Value Date    PHOS 2 3 (L) 12/31/2018       ABG: No results found for: PHART, AZL1PEO, PO2ART, BSJ3YKA, I4UZXUXG, BEART, SOURCE,     Microbiology:  No microbiology    Imaging: I have personally reviewed the pertinent imaging studies on the PACS system  CTA: 1  Patent major cervical and intracranial arteries without critical stenosis  No aneurysm or AV malformation      2   Peripherally distributed centrilobular nodular and small patchy groundglass opacities in the visualized upper lungs may represent bronchiolitis of infectious/inflammatory etiology  CT head: old lacunar infarcts in the left basal ganglia and right thalamus    EKG/telemetry/Echo:   EKG: normal sinus rhythm      VTE Prophylaxis: Sequential compression device (Venodyne)     Code Status: Level 1 - Full Code    Portions of the record may have been created with voice recognition software  Occasional wrong word or "sound a like" substitutions may have occurred due to the inherent limitations of voice recognition software  Read the chart carefully and recognize, using context, where substitutions have occurred

## 2019-01-01 ENCOUNTER — APPOINTMENT (INPATIENT)
Dept: CT IMAGING | Facility: HOSPITAL | Age: 44
DRG: 045 | End: 2019-01-01
Payer: COMMERCIAL

## 2019-01-01 LAB
ANION GAP SERPL CALCULATED.3IONS-SCNC: 8 MMOL/L (ref 4–13)
ATRIAL RATE: 64 BPM
BUN SERPL-MCNC: 11 MG/DL (ref 5–25)
CALCIUM SERPL-MCNC: 8.7 MG/DL (ref 8.3–10.1)
CHLORIDE SERPL-SCNC: 107 MMOL/L (ref 100–108)
CHOLEST SERPL-MCNC: 155 MG/DL (ref 50–200)
CO2 SERPL-SCNC: 26 MMOL/L (ref 21–32)
CREAT SERPL-MCNC: 1.3 MG/DL (ref 0.6–1.3)
ERYTHROCYTE [DISTWIDTH] IN BLOOD BY AUTOMATED COUNT: 13.3 % (ref 11.6–15.1)
EST. AVERAGE GLUCOSE BLD GHB EST-MCNC: 91 MG/DL
GFR SERPL CREATININE-BSD FRML MDRD: 67 ML/MIN/1.73SQ M
GLUCOSE SERPL-MCNC: 93 MG/DL (ref 65–140)
HBA1C MFR BLD: 4.8 % (ref 4.2–6.3)
HCT VFR BLD AUTO: 47.1 % (ref 36.5–49.3)
HDLC SERPL-MCNC: 33 MG/DL (ref 40–60)
HGB BLD-MCNC: 15.9 G/DL (ref 12–17)
LDLC SERPL CALC-MCNC: 91 MG/DL (ref 0–100)
MCH RBC QN AUTO: 31.2 PG (ref 26.8–34.3)
MCHC RBC AUTO-ENTMCNC: 33.8 G/DL (ref 31.4–37.4)
MCV RBC AUTO: 92 FL (ref 82–98)
P AXIS: 27 DEGREES
PLATELET # BLD AUTO: 204 THOUSANDS/UL (ref 149–390)
PMV BLD AUTO: 9.9 FL (ref 8.9–12.7)
POTASSIUM SERPL-SCNC: 3.7 MMOL/L (ref 3.5–5.3)
PR INTERVAL: 124 MS
QRS AXIS: 0 DEGREES
QRSD INTERVAL: 92 MS
QT INTERVAL: 462 MS
QTC INTERVAL: 476 MS
RBC # BLD AUTO: 5.1 MILLION/UL (ref 3.88–5.62)
SODIUM SERPL-SCNC: 141 MMOL/L (ref 136–145)
T WAVE AXIS: 36 DEGREES
TRIGL SERPL-MCNC: 156 MG/DL
VENTRICULAR RATE: 64 BPM
WBC # BLD AUTO: 8.48 THOUSAND/UL (ref 4.31–10.16)

## 2019-01-01 PROCEDURE — 70450 CT HEAD/BRAIN W/O DYE: CPT

## 2019-01-01 PROCEDURE — 99233 SBSQ HOSP IP/OBS HIGH 50: CPT | Performed by: INTERNAL MEDICINE

## 2019-01-01 PROCEDURE — 85027 COMPLETE CBC AUTOMATED: CPT | Performed by: NURSE PRACTITIONER

## 2019-01-01 PROCEDURE — G8996 SWALLOW CURRENT STATUS: HCPCS

## 2019-01-01 PROCEDURE — 83036 HEMOGLOBIN GLYCOSYLATED A1C: CPT | Performed by: NURSE PRACTITIONER

## 2019-01-01 PROCEDURE — 92610 EVALUATE SWALLOWING FUNCTION: CPT

## 2019-01-01 PROCEDURE — 93010 ELECTROCARDIOGRAM REPORT: CPT | Performed by: INTERNAL MEDICINE

## 2019-01-01 PROCEDURE — 80061 LIPID PANEL: CPT | Performed by: NURSE PRACTITIONER

## 2019-01-01 PROCEDURE — 99233 SBSQ HOSP IP/OBS HIGH 50: CPT | Performed by: PSYCHIATRY & NEUROLOGY

## 2019-01-01 PROCEDURE — G8997 SWALLOW GOAL STATUS: HCPCS

## 2019-01-01 PROCEDURE — 80048 BASIC METABOLIC PNL TOTAL CA: CPT | Performed by: NURSE PRACTITIONER

## 2019-01-01 RX ORDER — GUAIFENESIN 100 MG/5ML
200 SOLUTION ORAL EVERY 4 HOURS PRN
Status: DISCONTINUED | OUTPATIENT
Start: 2019-01-01 | End: 2019-01-03 | Stop reason: HOSPADM

## 2019-01-01 RX ORDER — CLOPIDOGREL BISULFATE 75 MG/1
75 TABLET ORAL DAILY
Status: DISCONTINUED | OUTPATIENT
Start: 2019-01-02 | End: 2019-01-02

## 2019-01-01 RX ADMIN — Medication 100 MG: at 11:25

## 2019-01-01 RX ADMIN — PANTOPRAZOLE SODIUM 40 MG: 40 TABLET, DELAYED RELEASE ORAL at 11:25

## 2019-01-01 RX ADMIN — GUAIFENESIN 200 MG: 100 SOLUTION ORAL at 11:25

## 2019-01-01 RX ADMIN — ATORVASTATIN CALCIUM 40 MG: 40 TABLET, FILM COATED ORAL at 17:58

## 2019-01-01 RX ADMIN — NICOTINE 1 PATCH: 14 PATCH TRANSDERMAL at 09:06

## 2019-01-01 RX ADMIN — FOLIC ACID 1 MG: 1 TABLET ORAL at 11:25

## 2019-01-01 RX ADMIN — DOCUSATE SODIUM 100 MG: 100 CAPSULE, LIQUID FILLED ORAL at 17:58

## 2019-01-01 RX ADMIN — GUAIFENESIN 200 MG: 100 SOLUTION ORAL at 18:35

## 2019-01-01 NOTE — PHYSICAL THERAPY NOTE
PHYSICAL THERAPY CANCELLATION NOTE          Patient Name: Wendy MIRELESUBC'V Date: 1/1/2019  PT consult received  Pt is 38 yo male admitted for CVA  Spoke to Booking Angel, RN; pt received TPA yesterday at 1430  Pt currently not appropriate for therapy 2* 24 s/p TPA window; okay to see after 1430 today  PT will f/u as schedule permits and patient appropriate      Fallon Gomez, PT,DPT

## 2019-01-01 NOTE — PLAN OF CARE
Activity Intolerance/Impaired Mobility     Mobility/activity is maintained at optimum level for patient Progressing        Communication Impairment     Ability to express needs and understand communication Alexander Rhoades Discharge to home or other facility with appropriate resources Progressing        Knowledge Deficit     Patient/family/caregiver demonstrates understanding of disease process, treatment plan, medications, and discharge instructions Progressing        Neurological Deficit     Neurological status is stable or improving Progressing        Nutrition     Nutrition/Hydration status is improving Progressing        Potential for Aspiration     Non-ventilated patient's risk of aspiration is minimized Progressing     Ventilated patient's risk of aspiration is minimized Progressing        Potential for Falls     Patient will remain free of falls Progressing        Prexisting or High Potential for Compromised Skin Integrity     Skin integrity is maintained or improved Progressing        SAFETY ADULT     Maintain or return to baseline ADL function Progressing     Maintain or return mobility status to optimal level Progressing

## 2019-01-01 NOTE — OCCUPATIONAL THERAPY NOTE
Occupational Therapy Cancellation Note        Patient Name: Lewis Riedel  GMTXQ'N Date: 1/1/2019    OT consult received  Pt admitted for CVA  Spoke to Fermín Zayas RN and pt received tPA 12/31 at 1430, pt currently not appropriate for therapy 2* 24hr s/p tPA window  Will f/u to complete OT evaluation as appropriate       Paula Vences MS, OTR/L

## 2019-01-01 NOTE — PROGRESS NOTES
Neurology - Progress Note  Christiano Ventura 37 y o  male MRN: [id number]  Unit/Bed#: ICU 13 Encounter: [id number]    Assessment:  Late acute right corona radiata infarct as evidenced on ct head  No hx of afib  Smokes 2 packs per day, more likely small vessel thrombosis and that mri will confirm this stroke seen on the ct head during stroke alert and less likely to find additional strokes on imaging  He appears to have improved with the IV tpa  Vascular risk factors suboptimized  Although we will change his antiplatet from aspirin to plavix, stronger suspicion that the smoking is the key risk factor for the development of this infarct  Lipid panel and hba1c appropriate  He states he has a diagnosis of htn however doesn't take antihypertensives as his baseline sbp runs in the 140s which is not terribly elevated and unlikely to be playing a role of any significance  Plan:  24 hour post IV tpa time less than two hours  If able to obtain mri brain w/o contrast in this time frame then will not need the 24 hour ct scan otherwise still would need both  If no hemorragic conversion start plavix 75 mg daily as he's passed swallowing eval with modifications  lipitor 80 mg daily   Smoking cessation  PT/OT  If no hemorragic conversion than can transfer out of ICU to the floors  2-D echo pending    Subjective:   Some improvement with left sided strength    ROS:  Per 12 point review, none additional other than slurring, lue>lle acute weakness, no baseline symptoms prior to the stroke symptoms  Vitals: Blood pressure 146/98, pulse 76, temperature 98 6 °F (37 °C), temperature source Temporal, resp  rate 22, height 5' 6" (1 676 m), weight 89 2 kg (196 lb 10 4 oz), SpO2 91 %  ,Body mass index is 31 74 kg/m²      Physical Exam:    General appearance: alert, appears stated age and cooperative  Head: Normocephalic, without obvious abnormality, atraumatic  Lungs: clear to auscultation bilaterally  Heart: regular rate and rhythm      Neurologic: Mental status: Alert, orientedX3, thought content appropriate  Attention/concentration intact  No expressive/receptive aphasia  CN 2-12: intact except left facial asymmetry, mild dysarthria  Motor: normal tone and bulk  5 power rue/rle bilat  Left deltoid 4, biceps/triceps 5, handgrip 4, finger to finger tapping slower, slightly clumsy with left side  Left hip flexor 4+, rest of extremity 5 power  Sensory: light touch, vibration, proprioception, temperature sensation intact throughout  Cerebellar: no dysmetria or ataxia ue/le bilat  Slow with finger to nose lue  DTR's: 2 ue bilat, weak cross adductor bilat, ankles 1 bilat  Plantars: right toe downgoing, left toe upgoing              Lab, Imaging and other studies: I have personally reviewed pertinent reports  Counseling / Coordination of Care  Total 31 min critical care time spent

## 2019-01-01 NOTE — UTILIZATION REVIEW
Initial Clinical Review    Admission: Date/Time/Statement: 12/31/18 @ 1812   12/31/18 1813  Inpatient Admission Once     Transfer Service: Critical Care/ICU       Question Answer Comment   Admitting Physician Gris Smith    Level of Care Critical Care    Estimated length of stay More than 2 Midnights    Certification I certify that inpatient services are medically necessary for this patient for a duration of greater than two midnights  See H&P and MD Progress Notes for additional information about the patient's course of treatment  PLEASE NOTE:   Patient transferred to [facility] from [facility] ED for suspected ischemic stroke  And s/p TPA @ [facility]    ED Arrival Information     Patient not seen in ED                       History of Illness:  37 y o  male who presents with a past medical history significant for TIA and GERD  Presented to the emergency department with dysarthria and left sided weakness  He stated that at 930-10 am began with left sided weakness that lasted approximately 45 minutes and had resolution  He had another episode around 1245 that lasted 15 minutes  He then began with dysarthria and left sided weakness that did not resolve and presented to the emergency department for evaluation  His NIHSS was 3 on admission to the ER and a stroke alert was called  Initial head CT was negative for acute findings and given tPA  He was transferred to [facility] for management after tPA  ALso rec'd Labetolol IV x 2 in ED     Patient has a history of TIA/Stroke 7 years ago at [facility] but does not have any records in epic  He has not been on any statin therapy or anticoagulation  He does not follow up with neurology  Of note he did fall going up stairs today due to weakness but denies any trauma  Upon arrival to the ICU he continues with dysarthria, right sided facial droop, nystagmus and minimal right sided extremity weakness        ADM Vital Signs:  98 5 - 70 - 19 149/106   96%  Weight:  89 2 kg    Abnormal Labs/Diagnostic Test Results: At [facility]:  Cr 1 35  Phos 2 3  EKG: NSR  CXR: nl  CT Head: 1  No acute intracranial CT abnormality  2  Old lacunar infarcts in the left basal ganglia and right thalamus  Also hypoattenuating area in the right corona radiata most likely sequela of subacute/chronic infarct    CTA Head & Neck: 1  Patent major cervical and intracranial arteries without critical stenosis  No aneurysm or AV malformation  2   Peripherally distributed centrilobular nodular and small patchy groundglass opacities in the visualized upper lungs may represent bronchiolitis of infectious/inflammatory etiology  Past Medical/Surgical History:   Past Medical History:   Diagnosis Date    GERD (gastroesophageal reflux disease)     TIA (transient ischemic attack)        Admitting Diagnosis: CVA (cerebral vascular accident) ([facility] [place] [address] ) [I63 9]    Age/Sex: 37 y o  male    Assessment/Plan:   1  Stroke like symptoms, suspect ischemic stroke  ? Continue on stroke pathway, received tPA at 1455  ? Repeat head CT tomorrow  ? MRI   ? Check echocardiogram  ? Permissive hypertension goal systolic blood pressure [id number]  ? Check lipid panel and hemoglobin a1c  ? Will likely need to be placed on anticoagulation as an outpatient  ? Consult neurology  2  Alcohol abuse  ? Drinks approximately 6 pack/day but has no history of w/d symptoms  ? Will place on thiamine and folic acid  3  Tobacco abuse  ? Provide with Nicoderm patch  4  GERD  ? Continue on protonix   5  GUNJAN, unsure of baseline  ? Creatinine 1 3 but unclear what baseline level is  ? Does not have previous blood work in system  ?  Will check BMP in AM and give gentle hydration of 50 ml/hr overnight    Admission Orders:  IP  ICU  Neuro Checks q15 min x 2 hrs, q 30 min x 6 hrs, q1h x 16 hrs  Consult Neuro  PT / OT / Speech EVAL  Dysphagia assessment  MRI Brain  ECHO  Repeat CT Head   SCD's  NPO    Scheduled Meds:   Current Facility-Administered Medications:  atorvastatin 40 mg Oral QPM Hilary LAURIE Petit    docusate sodium 100 mg Oral BID LAURIE Dean    folic acid 1 mg Oral Daily LAURIE Dean    guaiFENesin 200 mg Oral Q4H PRN Lorence Flick, LAURIE    nicotine 1 patch Transdermal Daily Frankie  LAURIE Hickey    pantoprazole 40 mg Oral Early Morning Hilary K LAURIE Hickey    senna 1 tablet Oral Daily Hilary K Edelmira, LAURIE            thiamine 100 mg Oral Daily Hilary K Edelmira CRNP      Continuous Infusions:   sodium chloride 50 mL/hr Last Rate: 50 mL/hr (12/31/18 2037)     PRN Meds: guaiFENesin x 1  1/1 1/1: 98 2 - 64 - 19   161/104  CN 2-12: intact except left facial asymmetry, mild dysarthria  Motor: normal tone and bulk  5 power rue/rle bilat  Left deltoid 4, biceps/triceps 5, handgrip 4, finger to finger tapping slower, slightly clumsy with left side  Left hip flexor 4+, rest of extremity 5 power  Per Neuro: Late acute right corona radiata infarct as evidenced on ct head  ECHO and MRI pending  If no hemorragic conversion start plavix 75 mg daily as he's passed swallowing eval with modifications  [address] Utilization Review Department  Phone: [phone]; Fax [phone]  [email or web address]  ATTENTION: Please call with any questions or concerns to [phone]  and carefully listen to the prompts so that you are directed to the right person  Send all requests for admission clinical reviews, approved or denied determinations and any other requests to fax [phone]   All voicemails are confidential

## 2019-01-01 NOTE — PROGRESS NOTES
Received pt from ICU, pt is a/o x4  Pt presents with a slurring and a left facial droop  Pt has no c/o pain Pt has no other CVA residual  Pt is ambulating with no assistance  RN called to the ICU for clarification on neuro checks, was told by Wenceslao Hallman that pt is on q4 hour neuro checks  Will begin q 4 hour neuro checks  Pt was instructed on how to use call bell  Instructed pt on how to use the phone and call for his food    Will continue to monitor

## 2019-01-01 NOTE — PROGRESS NOTES
Progress Note - Critical Care   Christiano Ventura 37 y o  male MRN: [id number]  Unit/Bed#: ICU 13 Encounter: [id number]    Assessment/Plan:  1  Suspected ischemic CVA status post tPA  · Neurology is following  Symptoms of L sided weakness have improved  Continue neuro checks per stroke pathway  · Continue atorvastatin 80mg  Start aspirin if repeat head CT is normal and ok with neuro  · MRI pending  Plan for repeat head CT 24 hours post TPA unless MRI is able to be done in close proximity  · Echo is pending  · Pt failed bedside swallow eval   Keep NPO for now  Formal speech evaluation is pending  · PT/OT  2  Suspected mild GUNJAN  · Unclear baseline  Creatinine is slightly improved with IV fluids  Continue gentle IV fluid hydration until tolerating adequate p o  Intake  Trend renal indices and monitor intake and output  3  Chronic alcohol use/abuse  · Continue daily thiamine and folic acid  Monitor for any signs of withdrawal   4  Current smoker  · Recommend cessation  Continue nicotine patch  5  GERD  · Continue daily protonix    _____________________________________________________________________    HPI/24hr events:   Afebrile  No acute events overnight    Medications:    Current Facility-Administered Medications:  atorvastatin 40 mg Oral QPM Hilary K Bilofsky, CRNP    docusate sodium 100 mg Oral BID Relda Comes Bilofsky, CRNP    folic acid 1 mg Oral Daily Relda Comes Bilofsky, CRNP    nicotine 1 patch Transdermal Daily Hilary K Bilofsky, CRNP    pantoprazole 40 mg Oral Early Morning Hilary K Bilofsky, CRNP    senna 1 tablet Oral Daily Hilary K Bilofsky, CRNP    sodium chloride 50 mL/hr Intravenous Continuous Jacquelin Pandestrella CRNP Last Rate: 50 mL/hr (12/31/18 2037)   thiamine 100 mg Oral Daily Hilary K Bilofsky, CRNP          sodium chloride 50 mL/hr Last Rate: 50 mL/hr (12/31/18 2037)         Physical exam:  Vitals: Body mass index is 31 74 kg/m²    Blood pressure 143/99, pulse (!) 54, temperature 98 7 °F (37 1 °C), temperature source Temporal, resp  rate 21, height 5' 6" (1 676 m), weight 89 2 kg (196 lb 10 4 oz), SpO2 94 %  ,  Temp  Min: 97 8 °F (36 6 °C)  Max: 98 9 °F (37 2 °C)  IBW: 63 8 kg    SpO2: 94 %            Intake/Output Summary (Last 24 hours) at 01/01/19 0653  Last data filed at 01/01/19 0600   Gross per 24 hour   Intake           469 17 ml   Output              730 ml   Net          -260 83 ml       Invasive/non-invasive ventilation settings:   Respiratory    Lab Data (Last 4 hours)    None         O2/Vent Data (Last 4 hours)    None              Invasive Devices     Peripheral Intravenous Line            Peripheral IV 12/31/18 Left Hand 1 day    Peripheral IV 12/31/18 Right Antecubital less than 1 day                  Physical Exam:  Gen: Awake, alert, appropriate, no acute distress  HEENT:  Atraumatic, normocephalic, extraocular movements intact, pupils 3 mm equal and reactive, no nystagmus, oropharynx clear  Neck:  Supple, trachea midline, no JVD, no lymphadenopathy  Chest:  Clear to auscultation bilaterally, no wheeze, rales, rhonchi  Cor:  Single S1/S2, no murmurs, rubs, gallops, regular rate and rhythm  Abd:  Soft, nontender, nondistended, bowel sounds normoactive  Ext:  No edema, no clubbing or cyanosis  Neuro:  Oriented x3, strength 5/5 x4 extremities, left  is slightly weaker relative to right, mild left facial droop with left-sided tongue deviation, speech slightly slurred  Skin:  Warm, dry      Diagnostic Data:  Lab: I have personally reviewed pertinent lab results     CBC:     Results from last 7 days  Lab Units 01/01/19  0453 12/31/18  1423 12/31/18  1415   WBC Thousand/uL 8 48 9 49  --    HEMOGLOBIN g/dL 15 9 16 6  --    I STAT HEMOGLOBIN g/dl  --   --  16 3   HEMATOCRIT % 47 1 48 1  --    HEMATOCRIT, ISTAT %  --   --  48   PLATELETS Thousands/uL 204 209  --        CMP:     Results from last 7 days  Lab Units 01/01/19  0453 12/31/18  1423 12/31/18  1415   SODIUM mmol/L 141 140  --    POTASSIUM mmol/L 3 7 3 9  --    CHLORIDE mmol/L 107 107  --    CO2 mmol/L 26 24  --    CO2, I-STAT mmol/L  --   --  22   BUN mg/dL 11 12  --    CREATININE mg/dL 1 30 1 35*  --    CALCIUM mg/dL 8 7 8 9  --    GLUCOSE, ISTAT mg/dl  --   --  107     PT/INR:   Lab Results   Component Value Date    INR 1 07 12/31/2018   ,   Magnesium:     Results from last 7 days  Lab Units 12/31/18  1423   MAGNESIUM mg/dL 1 9     Phosphorous:     Results from last 7 days  Lab Units 12/31/18  1423   PHOSPHORUS mg/dL 2 3*       Microbiology:    Imaging:  No new imaging    Cardiac lab/EKG/telemetry/ECHO:   NSR on telemetry    VTE Prophylaxis: SCDs    Code Status: Level 1 - Full Code    Celestine Captain Spatzer, CRNP    Portions of the record may have been created with voice recognition software  Occasional wrong word or "sound a like" substitutions may have occurred due to the inherent limitations of voice recognition software  Read the chart carefully and recognize, using context, where substitutions have occurred

## 2019-01-01 NOTE — PLAN OF CARE
Problem: SLP ADULT - SWALLOWING, IMPAIRED  Goal: Initial SLP swallow eval performed  Outcome: Completed Date Met: 01/01/19

## 2019-01-01 NOTE — SPEECH THERAPY NOTE
Speech Language/Pathology  Speech/Language Pathology  Assessment    Patient Name: Laury Houston  [id number] Date: 1/1/2019     Problem List  Patient Active Problem List   Diagnosis    [facility]A [facility]cere[facility] vascular accident) ([facility] [address] )    Gastroesophageal reflux disease without esophagitis    TIA (transient ischemic attack)    Alcohol abuse    Tobacco abuse     Past Medical History  Past Medical History:   Diagnosis Date    GERD (gastroesophageal reflux disease)     TIA (transient ischemic attack)      Past Surgical History  History reviewed  No pertinent surgical history  History       Chief Complaint   Patient presents with    Slurred Speech       slurred speech with left sided numbness and heaviness at 1000 this am  went away, slurred speech came back in ambulance on way here  hx of TIAs  Marisel Devlinop STROKE Alert      36 y/o male with hx TIA 7 yr prior consisting of dysarthria and left upper extremity/left lower extremity weakness presents now with onset of dysarthria/left perioral facial droop/left upper+left lower extremity weakness beginning at 1330 today  Had similar episode in same distribution at 1000 today lasting approximately 45 minutes with complete resolution and subsequently at 1245 today lasting approximately 15 minutes with full resolution  Patient also reports similar episode occurring approximately 2 weeks ago in which he had approximately 20 minutes episode of left upper extremity weakness that also resolved  States he was not supposed to be on any medications following his previous TIA  States he does not take any medications presently including any anticoagulant medications  States that he did fall while ascending stairs at approximately 1030 this morning due to weakness in the left lower extremity but denies striking his head or losing consciousness  He describes it as tripping and denies head/chest/abdominal injury from fall   Otherwise denies headache/blurred vision/double vision/PERRLA nausea/vomiting/chest pain/dyspnea/extremity paresthesias/vertigo/syncope  Did take aspirin 81 mg at 1130 this morning d/t sx  Patient seen immediately upon arrival; upon my evaluation, it is obvious that he is having active neurologic symptoms with a defined neurologic distribution  He was activated as a stroke alert  Will obtain emergent consultation with neurologist and emergent CT head/CTA head plus neck  HPI:    Sidney Maharaj is a 37 y o  male who presents with a past medical history significant for TIA and GERD  Presented to the emergency department with dysarthria and left sided weakness  He stated that at 930-10 am began with left sided weakness that lasted approximately 45 minutes and had resolution  He had another episode around 1245 that lasted 15 minutes  He then began with dysarthria and left sided weakness that did not resolve and presented to the emergency department for evaluation  His NIHSS was 3 on admission to the ER and a stroke alert was called  Initial head CT was negative for acute findings and given tPA  He was transferred to [address] [facility] for management after tPA  Patient has a history of TIA/Stroke 7 years ago at [address] but does not have any records in epic  He has not been on any statin therapy or anticoagulation  He does not follow up with neurology  Of note he did fall going up stairs today due to weakness but denies any trauma  Upon arrival to the ICU he continues with dysarthria, right sided facial droop, nystagmus and minimal right sided extremity weakness  Bedside Swallow Evaluation:    Summary:  Pt presents w/ left labial droop and left lingual deviation, at least mild dysarthria  Tolerated ice chips, thin by straw, puree, portion of a turkey sandwich (though needed cues to bring it to the R side of his mouth as it was partially falling from the left), and graciela crackers  Seems a bit impulsive with reduced insight into deficits  Edentulous   States dentures are at home but he doesn't wear them much  Recommendations:  Diet: Dysphagia 3/soft to chew  Liquid: thin  Meds: as tolerated  Supervision: Close  Positioning:Upright  Strategies: Pt to take PO/Meds only when fully alert and upright  Check for pocketing  Slow rate  Assist w/ set up as needed (left hand/arm weak)  Oral care  Aspiration precautions  Reflux precautions    Therapy Prognosis: favorable  Prognosis considerations: age, lack of co-morbidities  Frequency: 3-5x/week    Patient's goal: wanted eggs and hodge    Consider consult w/:  Nutrition  Neuro    Reason for consult:  R/o aspiration  Determine safest and least restrictive diet  Failed nursing dysphagia assessment  Change in mental status  New neuro event  45 second coughing episode after drinking water w/ nurse  Current diet:  npo  Premorbid diet[de-identified]  regular  Previous VBS:  none  O2 requirement:  none  Voice/Speech: At least mildly dysarthric  Imprecise artic and fast rate  Social:  Lives w/ roommate, works as a , has children  Follows commands:  basic                      Cognitive Status:  Alert but impulsive  Not very pleasant  Oral mech exam:  Edentulous    Items administered:  Noted above  Liquids were taken by straw  Oral stage:  Mild  Lip closure:reduced on left, mild spill of sandwich  Mastication:wfl when cued to place food on R  Bolus formation:wfl  Bolus control:wfl/mildly reduced w/ sandwich  Transfer:wfl  Oral residue:none visible  Pocketing:none observed    Pharyngeal stage:  No overt s/s this eval when seated fully upright and cued to take his time  Took multiple consec sips of the water     Swallow promptness:prompt  Laryngeal rise:mildly reduced/adequate  Wet voice:no  Throat clear:no  Cough:no  Secondary swallows:no  Audible swallows:no  No s/s aspiration    Esophageal stage:  H/o GERD    Aspiration precautions posted    Results d/w:  Pt, nursing    Goal(s):  Pt will tolerate least restrictive diet w/out s/s aspiration or oral/pharyngeal difficulties  Pt will place food on R/stronger side for better mastication and oral containment w/ min cues  Pt will eat at a safe rate w/ min cues to reduce aspiration risk  Pt will perform at least 5 omes' to improve facial symmetry and speech intelligibility

## 2019-01-02 ENCOUNTER — APPOINTMENT (INPATIENT)
Dept: NON INVASIVE DIAGNOSTICS | Facility: HOSPITAL | Age: 44
DRG: 045 | End: 2019-01-02
Payer: COMMERCIAL

## 2019-01-02 ENCOUNTER — APPOINTMENT (INPATIENT)
Dept: MRI IMAGING | Facility: HOSPITAL | Age: 44
DRG: 045 | End: 2019-01-02
Payer: COMMERCIAL

## 2019-01-02 PROCEDURE — 97163 PT EVAL HIGH COMPLEX 45 MIN: CPT

## 2019-01-02 PROCEDURE — 93306 TTE W/DOPPLER COMPLETE: CPT | Performed by: INTERNAL MEDICINE

## 2019-01-02 PROCEDURE — 99232 SBSQ HOSP IP/OBS MODERATE 35: CPT | Performed by: HOSPITALIST

## 2019-01-02 PROCEDURE — 97167 OT EVAL HIGH COMPLEX 60 MIN: CPT

## 2019-01-02 PROCEDURE — G8987 SELF CARE CURRENT STATUS: HCPCS

## 2019-01-02 PROCEDURE — 92526 ORAL FUNCTION THERAPY: CPT

## 2019-01-02 PROCEDURE — G8988 SELF CARE GOAL STATUS: HCPCS

## 2019-01-02 PROCEDURE — G8979 MOBILITY GOAL STATUS: HCPCS

## 2019-01-02 PROCEDURE — 93306 TTE W/DOPPLER COMPLETE: CPT

## 2019-01-02 PROCEDURE — G8978 MOBILITY CURRENT STATUS: HCPCS

## 2019-01-02 PROCEDURE — 70551 MRI BRAIN STEM W/O DYE: CPT

## 2019-01-02 RX ORDER — CLOPIDOGREL BISULFATE 75 MG/1
75 TABLET ORAL DAILY
Status: DISCONTINUED | OUTPATIENT
Start: 2019-01-02 | End: 2019-01-03 | Stop reason: HOSPADM

## 2019-01-02 RX ADMIN — GUAIFENESIN 200 MG: 100 SOLUTION ORAL at 21:56

## 2019-01-02 RX ADMIN — PANTOPRAZOLE SODIUM 40 MG: 40 TABLET, DELAYED RELEASE ORAL at 05:18

## 2019-01-02 RX ADMIN — GUAIFENESIN 200 MG: 100 SOLUTION ORAL at 10:27

## 2019-01-02 RX ADMIN — GUAIFENESIN 200 MG: 100 SOLUTION ORAL at 05:38

## 2019-01-02 RX ADMIN — Medication 100 MG: at 10:26

## 2019-01-02 RX ADMIN — FOLIC ACID 1 MG: 1 TABLET ORAL at 10:26

## 2019-01-02 RX ADMIN — ATORVASTATIN CALCIUM 40 MG: 40 TABLET, FILM COATED ORAL at 17:27

## 2019-01-02 RX ADMIN — CLOPIDOGREL 75 MG: 75 TABLET, FILM COATED ORAL at 10:26

## 2019-01-02 RX ADMIN — NICOTINE 1 PATCH: 14 PATCH TRANSDERMAL at 10:25

## 2019-01-02 NOTE — SPEECH THERAPY NOTE
Speech Language/Pathology    Speech/Language Pathology Progress Note    Patient Name: Kirby Ramesh  [id number] Date: 1/2/2019     Problem List  Patient Active Problem List   Diagnosis    [facility] [address] [facility]    Gastroesophageal reflux disease without esophagitis    TIA (transient ischemic attack)    Alcohol abuse    Tobacco abuse        Past Medical History  Past Medical History:   Diagnosis Date    GERD (gastroesophageal reflux disease)     TIA (transient ischemic attack)         Past Surgical History  History reviewed  No pertinent surgical history  Subjective:  Pt alert  Needed assist w/ cutting his pork chop  Objective:  Seen for f/u dysphagia tx and potential dysarthria f/u  Pt was feeding self at a slower rate today  Cued x 1 for r sided po placement  mastication was adequate  Needed assist cutting pork chop due to left hand weakness  Tolerated rice wnl  No s/s aspiration w/ food or liquid  Attempted training on labial retraction and protusion to improve symmetry on Left  "ya ok"  Speech is mild to mod dysarthric  Cued for slow rate and increased volume  Not very receptive  He was more concerns about whether or not he was leaving today  Reduced insight into deficits  Assessment:  Tolerating regular diet  Cued for r sided po placement  Unable to complete a formal dysarthria eval  Not very receptive to oral strengthening and ROM exercises or cues to improve speech intelligibility  ? Reduced insight into deficits  Plan/Recommendations:  OK for regular diet  Will cont to attempt ome's and dysarthria dx/tx

## 2019-01-02 NOTE — PHYSICAL THERAPY NOTE
Addendum to correct actual treatment time to 1220  Kenneth Ch, PT,DPT    PT EVALUATION  Time-In: 1200  Time-Out: 1220  Total Time: 20 minutes    37 y o     [id number]    CVA (cerebral vascular accident) ([facility] [address] ) [I63 [id number]]    Length of Stay: 2    Past Medical History:   Diagnosis Date    GERD (gastroesophageal reflux disease)     TIA (transient ischemic attack)          History reviewed  No pertinent surgical history  01/02/19 1200   Note Type   Note type Eval only   Pain Assessment   Pain Assessment No/denies pain   Pain Score No Pain   Home Living   Type of Home Apartment  (2nd floor apartment)   Home Layout One level;Stairs to enter with rails  (1 flight of stairs w/ railing)   Bathroom Shower/Tub Tub/shower unit   Home Equipment Grab bars   Prior Function   Level of Lane Independent with ADLs and functional mobility   Lives With Friend(s)  (roommate)   Receives Help From Friend(s)  (roommate and girlfriend)   ADL Assistance Independent   IADLs Independent   Falls in the last 6 months 0   Vocational Full time employment  ([employer])   Comments No use of AD or DME for mobility PTA  (-)  and walks everywhere   Restrictions/Precautions   Weight Bearing Precautions Per Order No   Other Precautions Fall Risk;Multiple lines;Telemetry   General   Additional Pertinent History Early infarct identified within the right lentiform nucleus extending into the centrum semiovale without significant mass effect     Family/Caregiver Present No   Cognition   Overall Cognitive Status WFL   Arousal/Participation Alert   Orientation Level Oriented X4   Memory Within functional limits   Following Commands Follows multistep commands with increased time or repetition   RUE Assessment   RUE Assessment WFL  (see OT eval for details; LUE noted to be weaker than R)   LUE Assessment   LUE Assessment WFL  (see OT eval for details)   RLE Assessment   RLE Assessment WFL  (4+/5)   LLE Assessment   LLE Assessment WFL  (4+/5) Coordination   Movements are Fluid and Coordinated 0   Coordination and Movement Description (+) dysdiadokinesia with rapid alternating movements of dorsiflexion/plantarflexion   Sensation WFL   Light Touch   RLE Light Touch Grossly intact   LLE Light Touch Grossly intact   Proprioception   RLE Proprioception Grossly intact   LLE Proprioception Grossly Intact   Bed Mobility   Supine to Sit 6  Modified independent   Transfers   Sit to Stand 6  Modified independent   Stand to Sit 6  Modified independent   Ambulation/Elevation   Gait pattern (decreased L sided arm swing; slight possible L sided neglect)   Gait Assistance 6  Modified independent  (just initial verbal cueing for general safety)   Additional items Assist x 1;Verbal cues   Assistive Device None   Distance [id number]   Stair Management Assistance 5  Supervision   Additional items Assist x 1;Verbal cues   Stair Management Technique One rail R;One rail L;Foreward; Alternating pattern  (verbal cueing for sequencing and L foot placement)   Number of Stairs 14  (4+10)   Balance   Static Sitting Good   Dynamic Sitting Fair +   Static Standing Fair   Dynamic Standing Fair   Ambulatory Fair   Endurance Deficit   Endurance Deficit No   Activity Tolerance   Activity Tolerance Patient tolerated treatment well   Medical Staff ANGELA Jang   Nurse Made Aware yes; Ijeoma Brennan RN   Assessment   Prognosis Good   Problem List Decreased coordination;Decreased safety awareness; Impaired balance;Decreased mobility   Assessment PT consult received and evaluation complete  Pt is 37 y o  Male admitted from home w/ roommate for CVA (cerebral vascular accident) ([facility] [address] ) I63 9  Per imaging: early infarct identified within the right lentiform nucleus extending into the centrum semiovale without significant mass effect  Pt agreeable to participate w/ therapy and identified by 2 patient identifiers: name and birth date  Precautions include: fall risk, telemetry and impulsive   Pt presents supine in bed with telemetry  Pt has orders for activity as tolerated  PTA pt was independent w/ all functional mobility w/ no DME/AD, lived in one floor environment and had 12-14 VIRIDIANA w/ railing, independent ADLS, and  independent w/ IADLS, no driving, no recent falls 0, and employed  Pt presents w/ RLE MMT 4+/5, LLE MMT 4+/5, supine>sit mod (I), sit<>stand mod (I), gait training mod (I) no AD just initial cueing for safety, 14 stairs w/ railing supervision, and denies pain  At end of PT evaluation pt left sitting EOB w/ OT present for evaluation and RN aware of patient status  Pt would benefit from continued skilled PT for deficits in balance, locomotion, stair negotiation, functional mobility, safety awareness with mobility and coordination At this time recommend d/c OP PT History: co-morbidities, fall risk, VIRIDIANA, acute CVA diagnosis, and multiple lines Exam: strength, balance, locomotion, stair negotiation, functional endurance, functional mobility, safety awareness with mobility and coordination Barthel Index:85 Modified Blanche:3 Clinical: unstable/unpredictable: decreased safety awareness, telemetry, impulsive, ongoing management of primary diagnosis Complexity: high   Barriers to Discharge Inaccessible home environment   Barriers to Discharge Comments flight of stairs to enter   Goals   Patient Goals "to go home and get back to work"   LTG Expiration Date 01/09/19   Long Term Goal #1 In 7 days pt will demonstrate: sit<>stand and functional transfers (I) no AD for home function, gait training 500ft (I) no AD for home and community distances, 1 flight of stairs mod (I) w/ railing for home entrance, improve balance by 1 grade to decrease fall risk and promote (I) functional mobility, pt education on PT risk, role, benefits, POC, goals, and recommendations to optimize patient outcomes, patient functional, optimize LOS and promote discharge to least restrictive environment     Treatment Day 0   Plan Treatment/Interventions Functional transfer training;LE strengthening/ROM; Patient/family training;Gait training;Spoke to nursing;OT   PT Frequency Other (Comment)  (3-5x/wk)   Recommendation   Recommendation Outpatient PT  (pt reports his girlfriend will help him at home as needed)   Equipment Recommended (no DME needs identified at this time)   PT - OK to Discharge Yes  (once medically stable)   Modified Elbert Scale   Modified Elbert Scale 3   Barthel Index   Feeding 10   Bathing 0   Grooming Score 5   Dressing Score 10   Bladder Score 10   Bowels Score 10   Toilet Use Score 10   Transfers (Bed/Chair) Score 15   Mobility (Level Surface) Score 10   Stairs Score 5   Barthel Index Score 85     Mingo Rooney, PT ,DPT

## 2019-01-02 NOTE — PLAN OF CARE
Problem: PHYSICAL THERAPY ADULT  Goal: Performs mobility at highest level of function for planned discharge setting  See evaluation for individualized goals  Treatment/Interventions: Functional transfer training, LE strengthening/ROM, Patient/family training, Gait training, Spoke to nursing, OT  Equipment Recommended:  (no DME needs identified at this time)       See flowsheet documentation for full assessment, interventions and recommendations  Prognosis: Good  Problem List: Decreased coordination, Decreased safety awareness, Impaired balance, Decreased mobility  Assessment: PT consult received and evaluation complete  Pt is 37 y o  Male admitted from home w/ roommate for CVA (cerebral vascular accident) ([facility] [address] ) I63 9  Per imaging: early infarct identified within the right lentiform nucleus extending into the centrum semiovale without significant mass effect  Pt agreeable to participate w/ therapy and identified by 2 patient identifiers: name and birth date  Precautions include: fall risk, telemetry and impulsive  Pt presents supine in bed with telemetry  Pt has orders for activity as tolerated  PTA pt was independent w/ all functional mobility w/ no DME/AD, lived in one floor environment and had 12-14 VIRIDIANA w/ railing, independent ADLS, and  independent w/ IADLS, no driving, no recent falls 0, and employed  Pt presents w/ RLE MMT 4+/5, LLE MMT 4+/5, supine>sit mod (I), sit<>stand mod (I), gait training mod (I) no AD just initial cueing for safety, 14 stairs w/ railing supervision, and denies pain  At end of PT evaluation pt left sitting EOB w/ OT present for evaluation and RN aware of patient status   Pt would benefit from continued skilled PT for deficits in balance, locomotion, stair negotiation, functional mobility, safety awareness with mobility and coordination At this time recommend d/c OP PT History: co-morbidities, fall risk, VIRIDIANA, acute CVA diagnosis, and multiple lines Exam: strength, balance, locomotion, stair negotiation, functional endurance, functional mobility, safety awareness with mobility and coordination Barthel Index:85 Modified Blanche:3 Clinical: unstable/unpredictable: decreased safety awareness, telemetry, impulsive, ongoing management of primary diagnosis Complexity: high  Barriers to Discharge: Inaccessible home environment  Barriers to Discharge Comments: flight of stairs to enter  Recommendation: Outpatient PT (pt reports his girlfriend will help him at home as needed)     PT - OK to Discharge: Yes (once medically stable)    See flowsheet documentation for full assessment         Comments: Fallon Gomez, PT,DPT

## 2019-01-02 NOTE — ASSESSMENT & PLAN NOTE
Must quit smoking  Still has some slurred speech and very mild left arm weakness, but overall it is greatly improved since receiving tPA

## 2019-01-02 NOTE — PLAN OF CARE
Problem: OCCUPATIONAL THERAPY ADULT  Goal: Performs self-care activities at highest level of function for planned discharge setting  See evaluation for individualized goals  Treatment Interventions: ADL retraining, Functional transfer training, UE strengthening/ROM, Endurance training, Cognitive reorientation, Patient/family training, Equipment evaluation/education, Compensatory technique education, Energy conservation, Activityengagement, Visual perceptual retraining          See flowsheet documentation for full assessment, interventions and recommendations  Limitation: Decreased ADL status, Decreased UE strength, Decreased Safe judgement during ADL, Decreased endurance, Decreased self-care trans, Decreased high-level ADLs, Decreased fine motor control  Prognosis: Good  Assessment: Pt is a 37 y o  male seen for OT evaluation s/p admit to [facility] on 12/31/2018 w/ R sided facial droop, dysarthria; CVA (cerebral vascular accident) ([facility] [address] )  Pt transferred from [facility] to [address] [place] [address] for further medical management s/p tPA  CT head: Early infarct identified within the right lentiform nucleus extending into the centrum semiovale without significant mass effect  Comorbidities affecting pt's functional performance at time of assessment include: alcohol abuse, tobacco abuse, GERD, GUNJAN  Personal factors affecting pt at time of IE include: decreased insight into deficits  Prior to admission, pt was living w/ roommate and reports independent w/ ADLs, independent w/ functional transfers and mobility w/ no AD, independent w/ IADLs, working   Upon evaluation: Pt requires MOD I sit<>Stand, MOD I functional mobility w/ no AD, MOD I UB ADLs, supervision LB ADLs, MOD I/supervision toileting 2* the following deficits impacting occupational performance: R UE weakness (3+/5 shoulder flexion/abduction, 4-/5 elbow flexion,  4-/5), impaired coordination L UE w/ increased times, dysdiadochokinesia L UE, questionable decreased awareness to L side (pt walked into doorframe on L side, increased time w/ depth perception tasks, slightly decreased safety awareness  Pt to benefit from continued skilled OT tx while in the hospital to address deficits as defined above and maximize level of functional independence w ADL's and functional mobility  Occupational Performance areas to address include: grooming, bathing/shower, toilet hygiene, dressing, functional mobility, clothing management, cleaning and meal prep, home safety education, visual assessment  From OT standpoint, recommendation at time of d/c would be home w/ support and outpatient OT  Pt educated on use of L UE for tasks and putting items in/out of cabinet, reaching for items and completing AROM of L UE to full end range, pt receptive        OT Discharge Recommendation: Outpatient OT         Comments: Shilo Manrique MS, OTR/L

## 2019-01-02 NOTE — PROGRESS NOTES
Progress Note Jhonny Ramsay 1975, 37 y o  male MRN: [id number]    Unit/Bed#: E4 -01 Encounter: [id number]    Primary Care Provider: Jihan Gutierrez DO   Date and time admitted to hospital: 2018  6:12 PM        * CVA (cerebral vascular accident) [facility])   Assessment & Plan    Must quit smoking  Still has some slurred speech and very mild left arm weakness, but overall it is greatly improved since receiving tPA     Tobacco abuse   Assessment & Plan    Must quit smoking  Will prescribe chantix     Alcohol abuse   Assessment & Plan    On SAS protocol           Subjective:   Doing better  Less left arm weakness  Left facial droop  But both have greatly improved      Objective:     Vitals:   Temp (24hrs), Av 9 °F (36 6 °C), Min:97 4 °F (36 3 °C), Max:98 3 °F (36 8 °C)    Temp:  [97 4 °F (36 3 °C)-98 3 °F (36 8 °C)] 98 3 °F (36 8 °C)  HR:  [63-76] 76  Resp:  [18] 18  BP: (151-167)/(102-110) 162/109  SpO2:  [92 %-95 %] 93 %  Body mass index is 31 74 kg/m²  Input and Output Summary (last 24 hours): Intake/Output Summary (Last 24 hours) at 19 1802  Last data filed at 19 0900   Gross per 24 hour   Intake              250 ml   Output                0 ml   Net              250 ml       Physical Exam:     Physical Exam   HENT:   Head: Normocephalic and atraumatic  Eyes: Pupils are equal, round, and reactive to light  EOM are normal    Cardiovascular: Normal rate and regular rhythm  Exam reveals no gallop and no friction rub  No murmur heard  Pulmonary/Chest: Effort normal and breath sounds normal  He has no wheezes  He has no rales  Abdominal: Soft  Bowel sounds are normal  There is no tenderness  Musculoskeletal: He exhibits no edema  Neurological:   CN 2-12 intact except left facial droop  5/5 strength all ext except 4/5 strength LUE   Nursing note and vitals reviewed              Additional Data:     Labs:      Results from last 7 days  Lab Units 19  6393 12/31/18  1423   WBC Thousand/uL 8 48 9 49   HEMOGLOBIN g/dL 15 9 16 6   HEMATOCRIT % 47 1 48 1   PLATELETS Thousands/uL 204 209   NEUTROS PCT %  --  69   LYMPHS PCT %  --  21   MONOS PCT %  --  8   EOS PCT %  --  1       Results from last 7 days  Lab Units 01/01/19  0453  12/31/18  1415   POTASSIUM mmol/L 3 7  < >  --    CHLORIDE mmol/L 107  < >  --    CO2 mmol/L 26  < >  --    CO2, I-STAT mmol/L  --   --  22   BUN mg/dL 11  < >  --    CREATININE mg/dL 1 30  < >  --    CALCIUM mg/dL 8 7  < >  --    GLUCOSE, ISTAT mg/dl  --   --  107   < > = values in this interval not displayed  Results from last 7 days  Lab Units 12/31/18  1423   INR  1 07       Results from last 7 days  Lab Units 12/31/18  1437   POC GLUCOSE mg/dl 93       Results from last 7 days  Lab Units 01/01/19  0453   HEMOGLOBIN A1C % 4 8           * I Have Reviewed All Lab Data     Recent Cultures (last 7 days):             Last 24 Hours Medication List:     Current Facility-Administered Medications:  atorvastatin 40 mg Oral QPM Hilary K Bilofsky, CRNP   clopidogrel 75 mg Oral Daily Daniel Oris, CRNP   docusate sodium 100 mg Oral BID Hilary K Bilofsky, CRNP   folic acid 1 mg Oral Daily Hilary K Bilofsky, CRNP   guaiFENesin 200 mg Oral Q4H PRN Saintdilip Mayorgaon, CRNP   nicotine 1 patch Transdermal Daily Hilary K Bilofsky, CRNP   pantoprazole 40 mg Oral Early Morning Hilary K Bilofsky, CRNP   senna 1 tablet Oral Daily Hilary K Bilofsky, CRNP   thiamine 100 mg Oral Daily Hilary K Bilofsky, CRNP         VTE Pharmacologic Prophylaxis:   Pharmacologic: none with tPA      Current Length of Stay: 2 day(s)    Current Patient Status: Inpatient       Discharge Plan: tomorrow      Code Status: Level 1 - Full Code           Today, Patient Was Seen By: Obie Castleman, DO    ** Please Note: Dictation voice to text software may have been used in the creation of this document   **

## 2019-01-02 NOTE — CONSULTS
Please see telestroke note from 12/31/18 by Dr Onesimo Beltrán and  Progress note from Dr Tino Mccrary on 1/1/18

## 2019-01-02 NOTE — OCCUPATIONAL THERAPY NOTE
[address] Evaluation     Patient Name: Sophie GRIMM Date: 1/2/2019  Problem List  Patient Active Problem List   Diagnosis    CVA (cerebral vascular accident) ([address] )    Gastroesophageal reflux disease without esophagitis    TIA (transient ischemic attack)    Alcohol abuse    Tobacco abuse     Past Medical History  Past Medical History:   Diagnosis Date    GERD (gastroesophageal reflux disease)     TIA (transient ischemic attack)      Past Surgical History  History reviewed  No pertinent surgical history            01/02/19 1232   Note Type   Note type Eval/Treat   Restrictions/Precautions   Weight Bearing Precautions Per Order No   Other Precautions Fall Risk;Telemetry;Multiple lines   Pain Assessment   Pain Assessment No/denies pain   Pain Score No Pain   Home Living   Type of Home Apartment  (2nd floor)   Home Layout One level;Stairs to enter with rails  (1 flight of stairs)   Bathroom Shower/Tub Tub/shower unit   Bathroom Toilet Standard   Bathroom Equipment (no DME)   [address] bars   Additional Comments pt walks in town to stores and appointments   Prior Function   Level of [place] Independent with ADLs and functional mobility   Lives With Friend(s)  (roommate)   Receives Help From Friend(s)  (roommate and girlfriend)   ADL Assistance Independent   IADLs Independent   Falls in the last 6 months 0   Vocational Full time employment  ()   Comments pt reports no AD, pt reports independent in all ADLs, IADLs   Lifestyle   Autonomy per pt independent w/ ADLs, independent w/ functional transfers and mobility w/ no AD, working   Reciprocal Relationships friends and girlfriends   Service to Others works as a    Intrinsic Gratification watching tv   Subjective   Subjective "I just need to get back to work and make money"   ADL   Where Assessed Chair   Eating Assistance 7  Independent   Grooming Assistance 6  Modified Independent   UB Bathing Assistance 6  Modified Independent   LB Bathing Assistance 5  Supervision/Setup   UB Dressing Assistance 6  Modified independent   LB Dressing Assistance 5  Supervision/Setup   Toileting Assistance  6  Modified independent   Additional Comments educated dressing L UE first and sitting for LB ADLs, pt receptive   Bed Mobility   Additional Comments pt seated EOB pre/post session   Transfers   Sit to Stand 6  Modified independent   Stand to Sit 6  Modified independent   Functional Mobility   Functional Mobility 6  Modified independent   Additional Comments w/ no AD, cues for safety and to avoid obstacles in home and door frames on L side   Balance   Static Sitting Good   Dynamic Sitting Fair +   Static Standing Fair   Dynamic Standing Fair   Activity Tolerance   Activity Tolerance Treatment limited secondary to medical complications (Comment)   Nurse Made Aware appropriate to see per Grant PLASENCIA   RUE Assessment   RUE Assessment WFL  (4+/5)   LUE Assessment   LUE Assessment X   LUE Overall AROM   L Shoulder Flexion 115   L Shoulder ABduction 110   L Elbow Flexion WFL   L Elbow Extension WFL   L Elbow Supination 60   L Wrist Extension 10   L Mass Grasp 4-/5   LUE Strength   LUE Overall Strength Within Functional Limits - able to perform ADL tasks with strength   L Shoulder Flexion 3+/5   L Shoulder Extension 3+/5   L Shoulder ABduction 3+/5   L Shoulder Internal Rotation 3+/5   L Elbow Flexion 4-/5   L Elbow Extension 4-/5   L Wrist Flexion 3+/5   L Wrist Extension 3+/5   Hand Function   Gross Motor Coordination (increased time and impaired L UE)   Fine Motor Coordination Functional   Sensation   Light Touch No apparent deficits   Sharp/Dull No apparent deficits   Proprioception   Proprioception No apparent deficits   Vision-Basic Assessment   Current Vision No visual deficits   Vision - Complex Assessment   Ocular Range of Motion WFL   Head Position WDL   Tracking Able to track stimulus in all quads without difficulty   Acuity Able to read clock/calendar on wall without difficulty   Additional Comments pt during mobility bumped into doorway on L side, pt reports difficulty w/ tasks   Perception   Inattention/Neglect Appears intact   Cognition   Overall Cognitive Status [facility]   Arousal/Participation Alert; Cooperative   Attention Within functional limits   Orientation Level Oriented X4   Memory Within functional limits   Following Commands Follows one step commands without difficulty   Comments pt engages in appropriate conversations; pt educated on using LEFT UE as much as possible and pt receptive   Assessment   Limitation Decreased ADL status; Decreased UE strength;Decreased Safe judgement during ADL;Decreased endurance;Decreased self-care trans;Decreased high-level ADLs; Decreased fine motor control   Prognosis Good   Assessment Pt is a 37 y o  male seen for OT evaluation s/p admit to [facility] on 12/31/2018 w/ R sided facial droop, dysarthria; CVA (cerebral vascular accident) ([facility] [address] )  Pt transferred from [facility] to [address] [place] [address] for further medical management s/p tPA  CT head: Early infarct identified within the right lentiform nucleus extending into the centrum semiovale without significant mass effect  Comorbidities affecting pt's functional performance at time of assessment include: alcohol abuse, tobacco abuse, GERD, GUNJAN  Personal factors affecting pt at time of IE include: decreased insight into deficits  Prior to admission, pt was living w/ roommate and reports independent w/ ADLs, independent w/ functional transfers and mobility w/ no AD, independent w/ IADLs, working   Upon evaluation: Pt requires MOD I sit<>Stand, MOD I functional mobility w/ no AD, MOD I UB ADLs, supervision LB ADLs, MOD I/supervision toileting 2* the following deficits impacting occupational performance: R UE weakness (3+/5 shoulder flexion/abduction, 4-/5 elbow flexion,  4-/5), impaired coordination L UE w/ increased times, dysdiadochokinesia L UE, questionable decreased awareness to L side (pt walked into doorframe on L side, increased time w/ depth perception tasks, slightly decreased safety awareness  Pt to benefit from continued skilled OT tx while in the hospital to address deficits as defined above and maximize level of functional independence w ADL's and functional mobility  Occupational Performance areas to address include: grooming, bathing/shower, toilet hygiene, dressing, functional mobility, clothing management, cleaning and meal prep, home safety education, visual assessment  From OT standpoint, recommendation at time of d/c would be home w/ support and outpatient OT  Pt educated on use of L UE for tasks and putting items in/out of cabinet, reaching for items and completing AROM of L UE to full end range, pt receptive  Goals   Patient Goals "to go home and get back to work"   LTG Time Frame 10-14   Long Term Goal please see below goals   Plan   Treatment Interventions ADL retraining;Functional transfer training;UE strengthening/ROM; Endurance training;Cognitive reorientation;Patient/family training;Equipment evaluation/education; Compensatory technique education; Energy conservation; Activityengagement;Visual perceptual retraining   Goal Expiration Date 01/16/19   OT Frequency 3-5x/wk   Recommendation   OT Discharge Recommendation Outpatient OT   Barthel Index   Feeding 10   Bathing 0   Grooming Score 5   Dressing Score 10   Bladder Score 10   Bowels Score 10   Toilet Use Score 10   Transfers (Bed/Chair) Score 15   Mobility (Level Surface) Score 10   Stairs Score 5   Barthel Index Score 85   Modified [place] Scale   Modified [place] Scale 3     Occupational Therapy Goals to be met in 7-10 days:  1) Pt will improve activity tolerance to G for min 30 min txment sessions to enhance ADLs  2) Pt will complete ADLs/self care w/ mod I   3) Pt will complete toileting w/ mod I w/ G hygiene/thoroughness using DME PRN  4) Pt will improve functional transfers on/off all surfaces using DME PRN w/ G balance/safety including toileting w/ mod I  5) Pt will improve fx'l mobility during I/ADl/leisure tasks using DME PRN w/ g balance/safety w/ mod I  6) Pt will demonstrate improved standing tolerance to 3-5 minutes during functional tasks w/ no LOB to enhance ADL performance  7) Pt will demonstrate improved L UE strength by 1 MMT grade to enhance ADLS and functional transfers  8) Pt will engage in ongoing  assessments, screens, and activities t/o functional tasks w/ good participation to assist w/ adaptation and accommodations or rule out visual perceptual impairments      Documentation completed by: Darwin Xiao MS, OTR/L

## 2019-01-03 VITALS
BODY MASS INDEX: 31.6 KG/M2 | TEMPERATURE: 97.2 F | HEART RATE: 72 BPM | OXYGEN SATURATION: 96 % | WEIGHT: 196.65 LBS | HEIGHT: 66 IN | SYSTOLIC BLOOD PRESSURE: 135 MMHG | RESPIRATION RATE: 18 BRPM | DIASTOLIC BLOOD PRESSURE: 94 MMHG

## 2019-01-03 PROCEDURE — 99239 HOSP IP/OBS DSCHRG MGMT >30: CPT | Performed by: HOSPITALIST

## 2019-01-03 PROCEDURE — 99232 SBSQ HOSP IP/OBS MODERATE 35: CPT | Performed by: NURSE PRACTITIONER

## 2019-01-03 RX ORDER — LANOLIN ALCOHOL/MO/W.PET/CERES
100 CREAM (GRAM) TOPICAL DAILY
Qty: 30 TABLET | Refills: 0 | Status: SHIPPED | OUTPATIENT
Start: 2019-01-04

## 2019-01-03 RX ORDER — FOLIC ACID 1 MG/1
1 TABLET ORAL DAILY
Qty: 30 TABLET | Refills: 0 | Status: SHIPPED | OUTPATIENT
Start: 2019-01-04

## 2019-01-03 RX ORDER — CLOPIDOGREL BISULFATE 75 MG/1
75 TABLET ORAL DAILY
Qty: 30 TABLET | Refills: 1 | Status: SHIPPED | OUTPATIENT
Start: 2019-01-04 | End: 2019-03-07 | Stop reason: SDUPTHER

## 2019-01-03 RX ORDER — NICOTINE 21 MG/24HR
1 PATCH, TRANSDERMAL 24 HOURS TRANSDERMAL EVERY 24 HOURS
Qty: 28 PATCH | Refills: 0 | Status: SHIPPED | OUTPATIENT
Start: 2019-01-03

## 2019-01-03 RX ORDER — ATORVASTATIN CALCIUM 80 MG/1
80 TABLET, FILM COATED ORAL EVERY EVENING
Qty: 30 TABLET | Refills: 1 | Status: SHIPPED | OUTPATIENT
Start: 2019-01-03

## 2019-01-03 RX ORDER — ATORVASTATIN CALCIUM 80 MG/1
80 TABLET, FILM COATED ORAL EVERY EVENING
Status: DISCONTINUED | OUTPATIENT
Start: 2019-01-03 | End: 2019-01-03 | Stop reason: HOSPADM

## 2019-01-03 RX ADMIN — PANTOPRAZOLE SODIUM 40 MG: 40 TABLET, DELAYED RELEASE ORAL at 06:03

## 2019-01-03 RX ADMIN — DOCUSATE SODIUM 100 MG: 100 CAPSULE, LIQUID FILLED ORAL at 08:32

## 2019-01-03 RX ADMIN — FOLIC ACID 1 MG: 1 TABLET ORAL at 08:32

## 2019-01-03 RX ADMIN — CLOPIDOGREL 75 MG: 75 TABLET, FILM COATED ORAL at 08:32

## 2019-01-03 RX ADMIN — NICOTINE 1 PATCH: 14 PATCH TRANSDERMAL at 08:32

## 2019-01-03 RX ADMIN — GUAIFENESIN 200 MG: 100 SOLUTION ORAL at 03:58

## 2019-01-03 RX ADMIN — Medication 100 MG: at 08:32

## 2019-01-03 RX ADMIN — GUAIFENESIN 200 MG: 100 SOLUTION ORAL at 09:51

## 2019-01-03 RX ADMIN — SENNOSIDES 8.6 MG: 8.6 TABLET, FILM COATED ORAL at 08:32

## 2019-01-03 NOTE — PLAN OF CARE
Activity Intolerance/Impaired Mobility     Mobility/activity is maintained at optimum level for patient Progressing        Communication Impairment     Ability to express needs and understand communication Alexander Rhoades Discharge to home or other facility with appropriate resources Progressing        Knowledge Deficit     Patient/family/caregiver demonstrates understanding of disease process, treatment plan, medications, and discharge instructions Progressing        Neurological Deficit     Neurological status is stable or improving Progressing        Nutrition     Nutrition/Hydration status is improving Progressing        Nutrition/Hydration-ADULT     Nutrient/Hydration intake appropriate for improving, restoring or maintaining nutritional needs Progressing        Potential for Aspiration     Non-ventilated patient's risk of aspiration is minimized Progressing     Ventilated patient's risk of aspiration is minimized Progressing        Potential for Falls     Patient will remain free of falls Progressing        Prexisting or High Potential for Compromised Skin Integrity     Skin integrity is maintained or improved Progressing        SAFETY ADULT     Maintain or return to baseline ADL function Progressing     Maintain or return mobility status to optimal level Progressing

## 2019-01-03 NOTE — DISCHARGE SUMMARY
Discharge- Nathan Shi 1975, 37 y o  male MRN: [id number]    Unit/Bed#: E4 -01 Encounter: [id number]    Primary Care Provider: Christopher Valverde DO   Date and time admitted to hospital: 12/31/2018  6:12 PM        * CVA (cerebral vascular accident) [facility])   Assessment & Plan    Must quit smoking  Still has some slurred speech and very mild left arm weakness, but overall it is greatly improved since receiving tPA  Spoke with Neuro, will go home on Plavix and statin     Tobacco abuse   Assessment & Plan    Told patient that he absolutely must quit smoking and "vaping" is NOT a safe option  We explained that smoking likely caused his stroke  I will send him home with Nicotine patches  He was instructed not to smoke cigarettes or use any tobacco/nicotine containing products while wearing the patch  Alcohol abuse   Assessment & Plan    Continue thiamine and folic acid           Discharging Physician / Practitioner: Gretel Hayden DO  PCP: Christopher Valverde DO  Admission Date:   Admission Orders     Ordered        12/31/18 1817  Inpatient Admission  Once         12/31/18 1817  Inpatient Admission  Once             Discharge Date: 01/03/19    Resolved Problems  Date Reviewed: 1/2/2019    None            Consultations During Hospital Stay:  · neurology      Procedures Performed:     · CTA head  · MRI brain  · Echocardiogram      Reason for Admission: slurred speech and left arm weakness      Hospital Course:     Nathan Shi is a 37 y o  male patient who originally presented to the hospital on 12/31/2018 due to slurred speech and left arm weakness  He was felt to be having a stroke at [address]    He received tPA and his symptoms significantly improved  He was transferred to [address] [address]  Neuorology recommends Plavix, statin and absolute smoking cessation  Please see above list of diagnoses and related plan for additional information         Condition at Discharge: good       Discharge Day Visit / Exam:     Subjective:  Feels better  Less slurred speech  Less arm weaknes      Vitals: Blood Pressure: 135/94 (01/03/19 0710)  Pulse: 72 (01/03/19 0710)  Temperature: (!) 97 2 °F (36 2 °C) (01/03/19 0710)  Temp Source: Tympanic (01/03/19 0710)  Respirations: 18 (01/03/19 0710)  Height: 5' 6" (167 6 cm) (12/31/18 1810)  Weight - Scale: 89 2 kg (196 lb 10 4 oz) (12/31/18 1810)  SpO2: 96 % (01/03/19 0710)    Exam:     Physical Exam   HENT:   Head: Normocephalic and atraumatic  Eyes: Pupils are equal, round, and reactive to light  EOM are normal    Cardiovascular: Normal rate and regular rhythm  Exam reveals no gallop and no friction rub  No murmur heard  Pulmonary/Chest: Effort normal and breath sounds normal  He has no wheezes  He has no rales  Abdominal: Soft  Bowel sounds are normal  There is no tenderness  Musculoskeletal: He exhibits no edema  Neurological:   Left facial droop and 4/5 strength LUE  Otherwise no deficits   Nursing note and vitals reviewed            Discharge instructions/Information to patient and family:   See after visit summary for information provided to patient and family  Provisions for Follow-Up Care:  See after visit summary for information related to follow-up care and any pertinent home health orders  Disposition:     Home       Discharge Statement:  I spent 46 minutes discharging the patient  This time was spent on the day of discharge  I had direct contact with the patient on the day of discharge  Greater than 50% of the total time was spent examining patient, answering all patient questions, arranging and discussing plan of care with patient as well as directly providing post-discharge instructions  Additional time then spent on discharge activities  Discharge Medications:  See after visit summary for reconciled discharge medications provided to patient and family        ** Please Note: This note has been constructed using a voice recognition system **

## 2019-01-03 NOTE — PROGRESS NOTES
Progress Note - Neurology   Nathan Shi 37 y o  male MRN: [id number]  Unit/Bed#: E4 -01 Encounter: [id number]        Assessment/Plan :  1  Acute right corona radiata infarct  Patient presented with dysarthria and left sided weakness, now with symptoms improved s/p IV tPa     -MRI brain without contrast demonstrating  Stable subacute infarct in the right caudate, lentiform nuclei and corona radiata suggesting occlusion of a lenticulostriate artery, and numerous chronic lacunar infarct in the basal ganglia and microangiopathic disease  -Echo with LVEF 58%, no regional wall abnormalities, grade 1 diastolic dysfunction, mild annular calcification of the MV, bilateral atrium normal   -Lipid panel: Total cholesterol 155, HDL 33, LDL 91, HbA1C 4 8, TSH 0 366, T4 1 32  -Secondary risk factor modification  -continue Plavix 75 mg daily  -continue Lipitor 80 mg daily  -recommend tobacco and alcohol cessation  -BP control  -PT/OT/ST following  -requested outpatient follow up appointment with [facility]rology in 4-6 weeks     2  Tobacco use:  -currently with nicoderm transdermal  -discussed with patient at length the risks of tobacco use and need cessation  Patient voiced plan for cessation utilizing "vaping" as a bridge  Discussed with patient vaping is not a recommended alternative to smoking and carries it's own cardiovascular risks  3  Alcohol abuse:  -recommend cessation  -continue folate and thiamine      Subjective:   No acute events overnight  Patient continues with mild dysarthria and left facial droop, but reports improvement in LUE/LLE strength  He has no complaints at this time and would like to go home  Review of Systems - conducted and negative      Vitals: Blood pressure 135/94, pulse 72, temperature (!) 97 2 °F (36 2 °C), temperature source Tympanic, resp  rate 18, height 5' 6" (1 676 m), weight 89 2 kg (196 lb 10 4 oz), SpO2 96 %  ,Body mass index is 31 74 kg/m²      MEDS:    Current Facility-Administered Medications:  atorvastatin 40 mg Oral QPM Hilary K Edelmira, LAURIE   clopidogrel 75 mg Oral Daily Lucas Montiel, LAURIE   docusate sodium 100 mg Oral BID Leveda Riversidekimberlee Hickey, LAURIE   folic acid 1 mg Oral Daily Leveda Riversidekimberlee Hickey, LAURIE   guaiFENesin 200 mg Oral Q4H PRN Kimberley Pedroza, LAURIE   nicotine 1 patch Transdermal Daily Hilary K Niravofsky, LAURIE   pantoprazole 40 mg Oral Early Morning Hilary K Edelmira, LAURIE   senna 1 tablet Oral Daily Hilary K Edelmira, LAURIE   thiamine 100 mg Oral Daily Leveda Riversidekimberlee Hickey, LAURIE     Physical Exam   Constitutional: He is oriented to person, place, and time  He appears well-developed and well-nourished  No distress  HENT:   Head: Normocephalic and atraumatic  Mouth/Throat: Oropharynx is clear and moist    Eyes: Pupils are equal, round, and reactive to light  EOM are normal    Cardiovascular: Normal rate, regular rhythm and normal heart sounds  Exam reveals no gallop and no friction rub  No murmur heard  Pulmonary/Chest: Effort normal and breath sounds normal  No respiratory distress  He has no wheezes  He has no rales  He exhibits no tenderness  Abdominal: Soft  Bowel sounds are normal  He exhibits no distension  There is no tenderness  Musculoskeletal: Normal range of motion  He exhibits no edema  Neurological: He is alert and oriented to person, place, and time  Finger-nose-finger test: clumsiness LUE  Gait normal    Skin: Skin is warm and dry  He is not diaphoretic  Vitals reviewed  Neurologic Exam     Mental Status   Oriented to person, place, and time  Level of consciousness: alert    Cranial Nerves     CN III, IV, VI   Pupils are equal, round, and reactive to light  Extraocular motions are normal    Right pupil: Size: 2 mm  Shape: regular  Left pupil: Size: 2 mm  Shape: regular  Nystagmus: none   Diplopia: none  Left facial droop   Trace tongue deviation to left and mild dysarthria     Motor Exam   Muscle bulk: normal  Overall muscle tone: normal    Strength   Strength 5/5 except as noted  LUE deltoid 4+     Sensory Exam   Light touch normal      Gait, Coordination, and Reflexes     Gait  Gait: normal    Coordination   Finger-nose-finger test: clumsiness LUE  Lab Results:   I have personally reviewed pertinent reports  CBC:   Results from last 7 days  Lab Units 01/01/19  0453 12/31/18  1423 12/31/18  1415   WBC Thousand/uL 8 48 9 49  --    RBC Million/uL 5 10 5 28  --    HEMOGLOBIN g/dL 15 9 16 6  --    I STAT HEMOGLOBIN g/dl  --   --  16 3   HEMATOCRIT % 47 1 48 1  --    HEMATOCRIT, ISTAT %  --   --  48   MCV fL 92 91  --    PLATELETS Thousands/uL 204 209  --    BMP/CMP:   Results from last 7 days  Lab Units 01/01/19  0453 12/31/18  1423 12/31/18  1415   SODIUM mmol/L 141 140  --    POTASSIUM mmol/L 3 7 3 9  --    CHLORIDE mmol/L 107 107  --    CO2 mmol/L 26 24  --    CO2, I-STAT mmol/L  --   --  22   BUN mg/dL 11 12  --    CREATININE mg/dL 1 30 1 35*  --    GLUCOSE, ISTAT mg/dl  --   --  107   CALCIUM mg/dL 8 7 8 9  --    EGFR ml/min/1 73sq m 67 64 67   HgBA1C:   Results from last 7 days  Lab Units 01/01/19  0453   HEMOGLOBIN A1C % 4 8   TSH:   Results from last 7 days  Lab Units 12/31/18  1423   TSH 3RD GENERATON uIU/mL 0 366   Coagulation:   Results from last 7 days  Lab Units 12/31/18  1423   INR  1 07   Lipid Profile:   Results from last 7 days  Lab Units 01/01/19  0453   HDL mg/dL 33*   LDL CALC mg/dL 91   TRIGLYCERIDES mg/dL 156*     Imaging Studies: I have personally reviewed pertinent reports  and I have personally reviewed pertinent films in PACS     1/2/18 MRI Brain wo contrast:   IMPRESSION:  1   Stable subacute infarct in the right caudate, lentiform nuclei and corona radiata, suggesting occlusion of a lenticulostriate artery  No hemorrhagic conversion or significant mass effect  2   Numerous chronic lacunar infarcts in the basal ganglia and microangiopathic disease    Acute and chronic findings suggest sequela of chronic hypertension and advanced atherosclerotic disease for age  1/1/18 CT Head wo contrast:  IMPRESSION:   Early infarct identified within the right lentiform nucleus extending into the centrum semiovale without significant mass effect  There is no hemorrhagic transformation  EEG, Pathology, and Other Studies: I have personally reviewed pertinent reports  and I have personally reviewed pertinent films in PACS    VTE Prophylaxis: Sequential compression device (Venodyne)      Counseling / Coordination of Care  Total time spent today 30 minutes  Greater than 50% of total time was spent with the patient and / or family counseling and / or coordination of care  A description of the counseling / coordination of care: discussion of plan of care with patient   Discussion of tobacco cessation and cardiovascular risks associated

## 2019-01-03 NOTE — ASSESSMENT & PLAN NOTE
Must quit smoking  Still has some slurred speech and very mild left arm weakness, but overall it is greatly improved since receiving tPA  Spoke with Neuro, will go home on Plavix and statin

## 2019-01-03 NOTE — SOCIAL WORK
CM met with patient to discuss d/c plan  PT recommends Outpatient PT, provided list patient identified the PT location he would attend  Patient lives in a one level 2nd floor apartment with flight of stairs to enter with a roommate  PTA independent with adls, does not drive and walks everywhere or uses public transit, he receives help from his SO if needed, Yadi Carpio  Patient is employed full time as a   PCP is [facility]on and Rx coverage available  No POA/LW, did not want information  D/C home when medically cleared       Mammie ERINN Ahn  01/03/19  12:24 PM

## 2019-01-03 NOTE — DISCHARGE INSTRUCTIONS
Neurology recommendations: You were started on Plavix 75 mg daily and Atorvastatin 80 mg daily  Please take these medications as prescribed for secondary stroke prevention  Please follow up with Deuce Bhatia [facility] in 4-6 weeks  Stroke   WHAT YOU NEED TO KNOW:   A stroke happens when blood flow to part of the brain is interrupted  This can cause serious brain damage from a lack of oxygen  Brain function may be affected depending on where the stroke happens  A stroke caused by a blood clot is called an ischemic stroke  Ischemic stroke is the most common type  A stroke caused by a burst or torn blood vessel is called a hemorrhagic stroke  When stroke symptoms go away completely within minutes to hours and do not cause damage, it is called a transient ischemic attack (TIA)  A TIA is a warning sign that you are at risk of soon having a stroke  DISCHARGE INSTRUCTIONS:   Call 911 for any of the following:   · You have any of the following signs of a stroke:      ¨ Numbness or drooping on one side of your face     ¨ Weakness in an arm or leg    ¨ Confusion or difficulty speaking    ¨ Dizziness, a severe headache, or vision loss    ·            · You have a seizure  · You feel lightheaded, short of breath, and have chest pain  Seek care immediately if:   · Your blood sugar level or blood pressure is higher or lower than usual     · You have trouble swallowing  · You fall  Contact your healthcare provider if:   · You have trouble having a bowel movement or urinating  · You have questions or concerns about your condition or care  Medicines:  Medicines will depend on the kind of stroke you had  You may need any of the following:  · Medicines  may be given to treat high cholesterol, high blood pressure, or diabetes  · Antiplatelets , such as aspirin, help prevent blood clots  Take your antiplatelet medicine exactly as directed   These medicines make it more likely for you to bleed or bruise  If you are told to take aspirin, do not take acetaminophen or ibuprofen instead  · Blood thinners    help prevent blood clots  Examples of blood thinners include heparin and warfarin  Clots can cause strokes, heart attacks, and death  The following are general safety guidelines to follow while you are taking a blood thinner:    ¨ Watch for bleeding and bruising while you take blood thinners  Watch for bleeding from your gums or nose  Watch for blood in your urine and bowel movements  Use a soft washcloth on your skin, and a soft toothbrush to brush your teeth  This can keep your skin and gums from bleeding  If you shave, use an electric shaver  Do not play contact sports  ¨ Tell your dentist and other healthcare providers that you take anticoagulants  Wear a bracelet or necklace that says you take this medicine  ¨ Do not start or stop any medicines unless your healthcare provider tells you to  Many medicines cannot be used with blood thinners  ¨ Tell your healthcare provider right away if you forget to take the medicine, or if you take too much  ¨ Warfarin  is a blood thinner that you may need to take  The following are things you should be aware of if you take warfarin  § Foods and medicines can affect the amount of warfarin in your blood  Do not make major changes to your diet while you take warfarin  Warfarin works best when you eat about the same amount of vitamin K every day  Vitamin K is found in green leafy vegetables and certain other foods  Ask for more information about what to eat when you are taking warfarin  § You will need to see your healthcare provider for follow-up visits when you are on warfarin  You will need regular blood tests  These tests are used to decide how much medicine you need  · Take your medicine as directed  Contact your healthcare provider if you think your medicine is not helping or if you have side effects   Tell him or her if you are allergic to any medicine  Keep a list of the medicines, vitamins, and herbs you take  Include the amounts, and when and why you take them  Bring the list or the pill bottles to follow-up visits  Carry your medicine list with you in case of an emergency  Know the warning signs of a stroke: The word F A S T  can help you remember and recognize warning signs of a stroke  · F = Face:  One side of the face droops  · A = Arms:  One arm starts to drop when both arms are raised  · S = Speech:  Speech is slurred or sounds different than usual     · T = Time:  A person who is having a stroke needs to be seen immediately  A stroke is a medical emergency that needs immediate treatment  Most medicines and treatments work best the sooner they are given  ·        Follow up with your healthcare provider as directed: You may need to come in for regular tests of your brain function  If you are taking warfarin, you will need to come in for regular blood tests  Your INR levels will also need to be checked  These tests help make sure you are taking the right amount of warfarin  Write down your questions so you remember to ask them during your visits  Go to rehabilitation (rehab) as directed:  Rehab is an important part of treatment  A speech therapist can help you relearn or improve your ability to talk and swallow  You may start slowly and start doing more difficult tasks over time  Physical therapists can help you gain strength and build endurance  Occupational therapists can teach you new ways to do daily activities, such as getting dressed  Therapy can help you improve your ability to walk or keep your balance  Your therapy may include tasks or movements you will need to do for everyday activities  An example is being able to raise or lower yourself from a chair  Care for yourself after a stroke:   · Make your home safe  Remove anything you might trip over  Tape electrical cords down  Keep paths clear throughout your home  Make sure your home is well lighted  Put nonslip materials on surfaces that might be slippery  An example is your bathtub or shower floor  · Use assistive devices  A cane or walker may help you keep your balance as you walk  Prevent another stroke:   · Manage health conditions  Conditions such as atrial fibrillation and diabetes can increase your risk for a stroke  Control your glucose carefully if you have hyperglycemia or diabetes  · Check your blood pressure as directed  High blood pressure can increase your risk for a stroke  If you have high blood pressure, follow your healthcare provider's directions for controlling your blood pressure  · Do not smoke  Nicotine and other chemicals in cigarettes and cigars can increase your risk for another stroke and cause lung damage  Ask your healthcare provider for information if you currently smoke and need help to quit  E-cigarettes or smokeless tobacco still contain nicotine  Talk to your healthcare provider before you use these products  · Do not drink alcohol  Alcohol can increase your risk for a stroke  Alcohol may also increase your blood pressure or thin your blood  Blood thinning can increase your risk for hemorrhagic stroke  · Eat a variety of healthy foods  Healthy foods include whole-grain breads, low-fat dairy products, beans, lean meats, and fish  Eat at least 5 servings of fruits and vegetables each day  Choose foods that are low in fat, cholesterol, salt, and sugar  Eat foods that are high in potassium, such as potatoes and bananas  · Exercise as directed  Activity is important for preventing another stroke  You may need to work with an exercise therapist to learn how to exercise safely  Exercise may help you be able to do your normal activities more easily  Exercise also helps control your blood pressure and weight  · Maintain a healthy weight  Ask your healthcare provider how much you should weigh   Ask him or her to help you create a weight loss plan if you are overweight  Ask about the best exercise plan for you  · Manage stress  Stress can increase your blood pressure  Find new ways to relax, such as deep breathing or listening to music  What you need to know about depression:  Depression can happen after a stroke  Talk to your healthcare provider if you have depression that continues or is getting worse  Your provider may be able to help treat your depression  Your provider can also recommend support groups for you to join  A support group is a place to talk with others who have had a stroke  It may also help to talk to friends and family members about how you are feeling  Tell your family and friends that if they see these signs, to let your healthcare provider know  You may show any of the following signs of depression:  · Extreme sadness    · Avoiding social interaction with family or friends    · A lack of interest in things you once enjoyed    · Irritability    · Trouble sleeping    · Low energy levels    · A change in eating habits or sudden weight gain or loss  For support and more information:   · National Stroke Association  [address] , [address]  Phone: 7- 833 - 382-4670  Web Address: [employer]  org  © 2017  [facility] [place] [facility] Information is for End User's use only and may not be sold, redistributed or otherwise used for commercial purposes  All illustrations and images included in CareNotes® are the copyrighted property of [employer]  or Stevie Martinez  The above information is an  only  It is not intended as medical advice for individual conditions or treatments  Talk to your doctor, nurse or pharmacist before following any medical regimen to see if it is safe and effective for you        Cigarette Smoking and Your Health   WHAT YOU NEED TO KNOW:   What are the risks to my health if I smoke tobacco?  Nicotine and other chemicals found in tobacco damage every cell in your body  Even if you are a light smoker, you have an increased risk for cancer, heart disease, and lung disease  If you are pregnant or have diabetes, smoking increases your risk for complications  What are the benefits to my health if I stop smoking? · You decrease respiratory symptoms such as coughing, wheezing, and shortness of breath  · You reduce your risk for cancers of the lung, mouth, throat, kidney, bladder, pancreas, stomach, and cervix  If you already have cancer, you increase the benefits of chemotherapy  You also reduce your risk for cancer returning or a second cancer from developing  · You reduce your risk for heart disease, blood clots, heart attack, and stroke  · You reduce your risk for lung infections, and diseases such as pneumonia, asthma, chronic bronchitis, and emphysema  · Your circulation improves  More oxygen can be delivered to your body  If you have diabetes, you lower your risk for complications, such as kidney, artery, and eye diseases  You also lower your risk for nerve damage  Nerve damage can lead to amputations, poor vision, and blindness  · You improve your body's ability to heal and to fight infections  What are the health benefits to others if I stop smoking? Tobacco is harmful to nonsmokers who breathe in your secondhand smoke  The following are ways the health of others around you may improve when you stop smoking:  · You lower the risks for lung cancer and heart disease in nonsmoking adults  · If you are pregnant, you lower the risk for miscarriage, early delivery, low birth weight, and stillbirth  You also lower your baby's risk for SIDS, obesity, developmental delay, and neurobehavioral problems, such as ADHD  · If you have children, you lower their risk for ear infections, colds, pneumonia, bronchitis, and asthma  Where can I find more information and support to stop smoking? · Smokefree  gov  Phone: [address]  Web Address: www smokefree  gov  CARE AGREEMENT:   You have the right to help plan your care  Learn about your health condition and how it may be treated  Discuss treatment options with your caregivers to decide what care you want to receive  You always have the right to refuse treatment  The above information is an  only  It is not intended as medical advice for individual conditions or treatments  Talk to your doctor, nurse or pharmacist before following any medical regimen to see if it is safe and effective for you  © 2017 [address]  Information is for End User's use only and may not be sold, redistributed or otherwise used for commercial purposes  All illustrations and images included in CareNotes® are the copyrighted property of A D A M , Inc  or Stevie Martinez

## 2019-01-03 NOTE — ASSESSMENT & PLAN NOTE
Told patient that he absolutely must quit smoking and "vaping" is NOT a safe option  We explained that smoking likely caused his stroke  I will send him home with Nicotine patches  He was instructed not to smoke cigarettes or use any tobacco/nicotine containing products while wearing the patch

## 2019-01-03 NOTE — NURSING NOTE
Reviewed and discussed d/c instructions  Reviewed and discussed medications perscriptions doses and time  Rn discussed f/u with PCP for script for Chantix  Educated  Pt on smoking cessation    Pt's parents present at bedside

## 2019-01-10 ENCOUNTER — EVALUATION (OUTPATIENT)
Dept: PHYSICAL THERAPY | Facility: CLINIC | Age: 44
End: 2019-01-10
Payer: COMMERCIAL

## 2019-01-10 ENCOUNTER — TELEPHONE (OUTPATIENT)
Dept: NEUROLOGY | Facility: CLINIC | Age: 44
End: 2019-01-10

## 2019-01-10 DIAGNOSIS — G81.94 LEFT HEMIPARESIS (HCC): Primary | ICD-10-CM

## 2019-01-10 PROCEDURE — 97162 PT EVAL MOD COMPLEX 30 MIN: CPT | Performed by: PHYSICAL THERAPIST

## 2019-01-10 PROCEDURE — 97110 THERAPEUTIC EXERCISES: CPT | Performed by: PHYSICAL THERAPIST

## 2019-01-10 PROCEDURE — 97535 SELF CARE MNGMENT TRAINING: CPT | Performed by: PHYSICAL THERAPIST

## 2019-01-10 NOTE — PROGRESS NOTES
PT Evaluation     Today's date: 1/10/2019  Patient name: Christiano Ventura  : 1975  MRN: [id number]  Referring provider: LAURIE Shin  Dx:   Encounter Diagnosis     ICD-10-CM    1  Left hemiparesis ([facility] [address] ) G81 94                   Assessment  Assessment details: Patient demonstrates decreased strength, fine motor, balance/gait, posture and coordination limiting functional ability  Patient would benefit from PT intervention to address limitations in order to maximize functional independence  Impairments: abnormal coordination, abnormal gait, abnormal muscle tone, abnormal or restricted ROM, activity intolerance, impaired balance, impaired physical strength, lacks appropriate home exercise program and weight-bearing intolerance    Goals  ST  Initiate HEP  2  Patient to increase  strength by 25% in 4 weeks  3  Patient to increase left LE and UE strength by 1/2 grade in 4 weeks    LT  Patient to increase  strength on left to [facility] in 8 weeks  2  Patient to increase left LE and UE strength to 4+/5 in 8 weeks  3  Patient to increase balance and gait to [facility] in 8 weks    Plan  Patient would benefit from: skilled physical therapy  Planned therapy interventions: IADL retraining, ADL retraining, ADL training, manual therapy, balance, balance/weight bearing training, neuromuscular re-education, patient education, postural training, strengthening, stretching, therapeutic activities, therapeutic exercise, therapeutic training, functional ROM exercises, flexibility, graded activity, graded exercise, graded motor and home exercise program  Frequency: 2x week  Duration in visits: 8  Duration in weeks: 4  Treatment plan discussed with: patient        Subjective Evaluation    History of Present Illness  Date of onset: 2018  Mechanism of injury: Patient presents to PT with c/o left sided weakness with worst being left hand   Patient suffered a CVA on  and spent a week in the hospital  Patient now c/o weakness with worst being in left hand with frequently dropping objects  Patient also reports weakness in left UE and LE with occasional instability in left knee  Patient also complains of cognitive defects such as limited neurological control and input to muscles  Patient reports having to concentrate more and has difficulty with movement of left UE and LE  Patient does have a neurologist appointment on 3/7  Patient is now referred to oppt  Pain  Current pain ratin  At best pain ratin  At worst pain ratin    Treatments  Previous treatment: physical therapy and occupational therapy  Discharged from (in last 30 days): inpatient hospitalization  Patient Goals  Patient goals for therapy: increased strength          Objective     Active Range of Motion     Left Elbow   Flexion: WFL  Extension: WFL  Forearm supination: WFL  Forearm pronation: WFL    Right Elbow   Normal active range of motion    Left Wrist   Wrist flexion: WFL  Wrist extension: WFL  Radial deviation: WFL  Ulnar deviation: WFL      Right Wrist   Normal active range of motion    Additional Active Range of Motion Details  Left shoulder grossly WFL    Strength/Myotome Testing     Left Shoulder     Planes of Motion   Flexion: 4   Abduction: 4   External rotation at 0°: 4   Internal rotation at 0°: 4     Right Shoulder   Normal muscle strength    Left Elbow   Flexion: 4  Extension: 4  Forearm supination: 4  Forearm pronation: 4    Right Elbow   Normal strength    Left Wrist/Hand   Wrist extension: 4-  Wrist flexion: 4-  Radial deviation: 4-  Ulnar deviation: 4-     (2nd hand position)     Trial 1: 40    Trial 2: 42    Trial 3: 43    Right Wrist/Hand   Normal wrist strength     (2nd hand position)     Trial 1: 90    Trial 2: 100    Trial 3: 95    Ambulation     Observational Gait   Gait: asymmetric   Decreased walking speed, stride length, left stance time, left swing time and left step length     Left arm swing: decreased    Additional Observational Gait Details  Slight Ataxic gait noted with left sided hemiparesis          Precautions: None    Daily Treatment Diary     Manual                                                                                   Exercise Diary  1/10            Nustep             UBE             TR/HR             Standing SLR 3-way             QS             SLR             SAQ             Wrist AROM 3# 20x            Sup/Pron 3# 20x            Digiflex  Red 2 min            Putty , roll and pinch              pick-up             Rubberband finger extension                                                                                                            Modalities

## 2019-01-10 NOTE — LETTER
January 10, 2019    LAURIE [address]  [address] [facility]    Patient: Devota Bence   YOB: 1975   Date of Visit: 1/10/2019     Encounter Diagnosis     ICD-10-CM    1  Left hemiparesis [facility]) G81 94        Dear Dr Peggy Aguirre:    Please review the attached Plan of Care from [facility] recent visit  Please verify that you agree therapy should continue by signing the attached document and sending it back to our office  If you have any questions or concerns, please don't hesitate to call  Sincerely,    Jimmy Muhammad, PT      Referring Provider:      I certify that I have read the below Plan of Care and certify the need for these services furnished under this plan of treatment while under my care  LAURIE [address]  [address] [facility] [address]  VIA Facsimile: [phone]          PT Evaluation     Today's date: 1/10/2019  Patient name: Devota Bence  : 1975  MRN: [id number]  Referring provider: LAURIE Flor  Dx:   Encounter Diagnosis     ICD-10-CM    1  Left hemiparesis ([facility] [address] ) G81 94                   Assessment  Assessment details: Patient demonstrates decreased strength, fine motor, balance/gait, posture and coordination limiting functional ability  Patient would benefit from PT intervention to address limitations in order to maximize functional independence  Impairments: abnormal coordination, abnormal gait, abnormal muscle tone, abnormal or restricted ROM, activity intolerance, impaired balance, impaired physical strength, lacks appropriate home exercise program and weight-bearing intolerance    Goals  ST  Initiate HEP  2  Patient to increase  strength by 25% in 4 weeks  3  Patient to increase left LE and UE strength by 1/2 grade in 4 weeks    LT  Patient to increase  strength on left to [facility] in 8 weeks  2  Patient to increase left LE and UE strength to 4+/5 in 8 weeks  3   Patient to increase balance and gait to [facility] in 8 weks    Plan  Patient would benefit from: skilled physical therapy  Planned therapy interventions: IADL retraining, ADL retraining, ADL training, manual therapy, balance, balance/weight bearing training, neuromuscular re-education, patient education, postural training, strengthening, stretching, therapeutic activities, therapeutic exercise, therapeutic training, functional ROM exercises, flexibility, graded activity, graded exercise, graded motor and home exercise program  Frequency: 2x week  Duration in visits: 8  Duration in weeks: 4  Treatment plan discussed with: patient        Subjective Evaluation    History of Present Illness  Date of onset: 2018  Mechanism of injury: Patient presents to PT with c/o left sided weakness with worst being left hand  Patient suffered a CVA on  and spent a week in the hospital  Patient now c/o weakness with worst being in left hand with frequently dropping objects  Patient also reports weakness in left UE and LE with occasional instability in left knee  Patient also complains of cognitive defects such as limited neurological control and input to muscles  Patient reports having to concentrate more and has difficulty with movement of left UE and LE  Patient does have a neurologist appointment on 3/7  Patient is now referred to oppt     Pain  Current pain ratin  At best pain ratin  At worst pain ratin    Treatments  Previous treatment: physical therapy and occupational therapy  Discharged from (in last 30 days): inpatient hospitalization  Patient Goals  Patient goals for therapy: increased strength          Objective     Active Range of Motion     Left Elbow   Flexion: WFL  Extension: WFL  Forearm supination: [facility]  Forearm pronation: WFL    Right Elbow   Normal active range of motion    Left Wrist   Wrist flexion: WFL  Wrist extension: WFL  Radial deviation: WFL  Ulnar deviation: WFL      Right Wrist   Normal active range of motion    Additional Active Range of Motion Details  Left shoulder grossly [facility]    Strength/Myotome Testing     Left Shoulder     Planes of Motion   Flexion: 4   Abduction: 4   External rotation at 0°:  4   Internal rotation at 0°:  4     Right Shoulder   Normal muscle strength    Left Elbow   Flexion: 4  Extension: 4  Forearm supination: 4  Forearm pronation: 4    Right Elbow   Normal strength    Left Wrist/Hand   Wrist extension: 4-  Wrist flexion: 4-  Radial deviation: 4-  Ulnar deviation: 4-     (2nd hand position)     Trial 1: 40    Trial 2: 42    Trial 3: 43    Right Wrist/Hand   Normal wrist strength     (2nd hand position)     Trial 1: 90    Trial 2: 100    Trial 3: 95    Ambulation     Observational Gait   Gait: asymmetric   Decreased walking speed, stride length, left stance time, left swing time and left step length     Left arm swing: decreased    Additional Observational Gait Details  Slight Ataxic gait noted with left sided hemiparesis          Precautions: None    Daily Treatment Diary     Manual                                                                                   Exercise Diary  1/10            Nustep             UBE             TR/HR             Standing SLR 3-way             QS             SLR             SAQ             Wrist AROM 3# 20x            Sup/Pron 3# 20x            Digiflex  Red 2 min            Putty , roll and pinch              pick-up             Rubberband finger extension                                                                                                            Modalities

## 2019-01-10 NOTE — TELEPHONE ENCOUNTER
The purpose of this phone call is to assess patient's general wellbeing or for any assistance needed with follow-up care  Patient needs hospital follow up appointment with stroke team      Patient waiting for his initial PT eval at the moment and is unable to complete follow up  Willing to make an appointment  Patient does not wish to be seen in [facility], requesting [facility] appointment 3/7 at 11:30 am with Dr Daniel Nation  Patient agreeable to appointment  Will call back to finish assessment

## 2019-03-07 ENCOUNTER — OFFICE VISIT (OUTPATIENT)
Dept: NEUROLOGY | Facility: CLINIC | Age: 44
End: 2019-03-07
Payer: COMMERCIAL

## 2019-03-07 ENCOUNTER — TRANSCRIBE ORDERS (OUTPATIENT)
Dept: PHYSICAL THERAPY | Facility: CLINIC | Age: 44
End: 2019-03-07

## 2019-03-07 VITALS — HEART RATE: 79 BPM | SYSTOLIC BLOOD PRESSURE: 140 MMHG | DIASTOLIC BLOOD PRESSURE: 100 MMHG

## 2019-03-07 DIAGNOSIS — I63.9 STROKE (HCC): ICD-10-CM

## 2019-03-07 DIAGNOSIS — I63.81 ACUTE LACUNAR STROKE (HCC): Primary | ICD-10-CM

## 2019-03-07 DIAGNOSIS — F10.10 ALCOHOL ABUSE: ICD-10-CM

## 2019-03-07 DIAGNOSIS — G81.94 LEFT HEMIPARESIS (HCC): Primary | ICD-10-CM

## 2019-03-07 DIAGNOSIS — Z72.0 TOBACCO ABUSE: ICD-10-CM

## 2019-03-07 DIAGNOSIS — I10 HYPERTENSION, UNSPECIFIED TYPE: ICD-10-CM

## 2019-03-07 DIAGNOSIS — F48.2 PBA (PSEUDOBULBAR AFFECT): ICD-10-CM

## 2019-03-07 PROCEDURE — 99215 OFFICE O/P EST HI 40 MIN: CPT | Performed by: PSYCHIATRY & NEUROLOGY

## 2019-03-07 RX ORDER — CLOPIDOGREL BISULFATE 75 MG/1
75 TABLET ORAL DAILY
Qty: 30 TABLET | Refills: 3 | Status: SHIPPED | OUTPATIENT
Start: 2019-03-07 | End: 2019-06-29 | Stop reason: SDUPTHER

## 2019-03-07 RX ORDER — METOPROLOL SUCCINATE 25 MG/1
25 TABLET, EXTENDED RELEASE ORAL DAILY
Qty: 30 TABLET | Refills: 2 | Status: SHIPPED | OUTPATIENT
Start: 2019-03-07 | End: 2019-05-29 | Stop reason: SDUPTHER

## 2019-03-07 NOTE — PROGRESS NOTES
Patient seen for IE on 01/10/19  Patient DNS for next 2 scheduled appointments  No further contact from patient to reschedule  Patient to be discharged at this time due to script expiration

## 2019-03-07 NOTE — PROGRESS NOTES
Patient ID: Tess Curiel is a 37 y o  male  Assessment/Plan:    No problem-specific Assessment & Plan notes found for this encounter  Diagnoses and all orders for this visit:    Acute lacunar stroke [facility])- right caudate head and lentiform nucleus with significant improvement over time with now mild distal hand weakness- often drops objects but this is getting better  Has a left facial droop and does describe PBA- inappropriate laughter which he cannot control or crying per him when he normally would not  Denies depression, anxiety  - 2/2 stroke prevention: C/w Plavix and statin  - BP control important- 140/100 today in office and he reports it being this way for years- started him on metoprolol today but he will follow up with this with his PCP and further changes/adjustments per PCP   - Discussed stroke RF modification including tobacco cessation and alcohol abuse cessation at length  He states he is now down to less than half a pack a day (vs 3 packs a day prior) - encouraged him to continue reducing down on this as he has been  - States alcohol now drinking two days a week about one- six pack lesser than prior- discussed continuing to reduce this also   - C/w Thiamine 100 mg daily  Tobacco abuse    Alcohol abuse    PBA (pseudobulbar affect)  -     dextromethorphan-quinidine (NUEDEXTA) 20-10 mg; Take 1 tab daily x 1 week, then 1 tab BID    Stroke (HCC)  -     clopidogrel (PLAVIX) 75 mg tablet; Take 1 tablet (75 mg total) by mouth daily  -     dextromethorphan-quinidine (NUEDEXTA) 20-10 mg; Take 1 tab daily x 1 week, then 1 tab BID  -     metoprolol succinate (TOPROL-XL) 25 mg 24 hr tablet; Take 1 tablet (25 mg total) by mouth daily    Hypertension, unspecified type  -     metoprolol succinate (TOPROL-XL) 25 mg 24 hr tablet; Take 1 tablet (25 mg total) by mouth daily     I filled out a form for child support for his  today with above noted information    Total time spent with direct face to face care counseling as noted above including stroke RF reviewing mri with patient and showing him location of his stroke at least 40 minutes    Follow up in 3-4 months time    Subjective:    HPI      Mr  Sidney Maharaj is a pleasant 38 yo male, hx tobacco and alcohol abuse seen as a hospital follow up after 12/31/2018 stroke admission  He had symptoms of slurred speech and left arm weakness and received tPA with significant symptoms improvement  He was trasnferred to [facility] and started on Plavix and statin    MRI brain done 1/2/19 with acute stroke caudate head and lentiform nucleus region and chronic small vessel ischemic changes (personally reviewed in PACS) and CTA with no significant stenosis  ECHO with largely intact EF 58% and normal atrium and no significant valvular disease or intracardiac thrombus noted    I reviewed neurology notes/other pertinent records  He tells me since discharge significant arm weakness improvement  He went to PT briefly and continues to do exercises at home by himself  States the only weakness he notes now is distal hands- keeps dropping thing  Denies leg weakness- states much better, denies balance issues falls  States no significant dysphagia but cannot take meds without water now- could do so pre-stroke  States "his emotions are weird" since stroke   States he will laugh for no reason and cry if watching an even mildly emotional movie and he was never one for tears before  Denies depression  States working on smoking cessation, now less than pack a day vs at least 3 packs a day pre stroke  States tried Chiantix with horrible side effects  States reduced alcohol use 5-6 beers two days a week  Continues to take thiamine and folic acid daily  Tells me /100 for years at other doctors offices today but never treated  Denies family hx stroke at a young age      The following portions of the patient's history were reviewed and updated as appropriate: allergies, current medications, past family history, past medical history, past social history, past surgical history and problem list and ROS         Objective:    Blood pressure 140/100, pulse 79  Physical Exam   Constitutional: He appears well-developed and well-nourished  HENT:   Head: Normocephalic and atraumatic  Eyes: Pupils are equal, round, and reactive to light  Conjunctivae and EOM are normal    Neck: Normal range of motion  Neck supple  Cardiovascular: Normal rate and regular rhythm  Pulmonary/Chest: Effort normal    Musculoskeletal: Normal range of motion  Neurological: He is alert  He has normal reflexes  Coordination normal    Nursing note and vitals reviewed  Neurological Exam  Mental Status  Alert  Oriented to person, place, time and situation  Recent and remote memory are intact  no dysarthria present  Language is fluent with no aphasia  Attention and concentration are normal  Fund of knowledge is appropriate for level of education  Cranial Nerves  CN II: Visual fields full to confrontation  CN III, IV, VI: Extraocular movements intact bilaterally  Extraocular movements intact bilaterally  Pupils equal round and reactive to light bilaterally  CN V: Facial sensation is normal   CN VII:  Left: There is central facial weakness  CN VIII: Hearing is normal   CN IX, X: Palate elevates symmetrically  Normal gag reflex  CN XI: Shoulder shrug strength is normal   CN XII: Tongue midline without atrophy or fasciculations  Motor   Normal muscle tone  Strength is 5/5 in all four extremities except as noted  LUE distal 4+/5  Sensory  Sensation is intact to light touch, pinprick, vibration and proprioception in all four extremities  Light touch is normal in upper and lower extremities  Temperature is normal in upper and lower extremities  Vibration is normal in upper and lower extremities  No right-sided hemispatial neglect  No left-sided hemispatial neglect      Reflexes  Deep tendon reflexes are 2+ and symmetric in all four extremities with downgoing toes bilaterally  Coordination  Finger-to-nose, rapid alternating movements and heel-to-shin normal bilaterally without dysmetria  Gait  Casual gait is normal including stance, stride, and arm swing  Normal tandem gait  Romberg is absent  ROS:    Review of Systems   Constitutional: Negative  Negative for appetite change and fever  HENT: Negative  Negative for hearing loss, tinnitus, trouble swallowing and voice change  Eyes: Negative  Negative for photophobia and pain  Respiratory: Negative  Negative for shortness of breath  Cardiovascular: Negative  Negative for palpitations  Gastrointestinal: Negative  Negative for nausea and vomiting  Endocrine: Negative  Negative for cold intolerance and heat intolerance  Genitourinary: Negative  Negative for dysuria, frequency and urgency  Musculoskeletal: Negative  Negative for myalgias and neck pain  Skin: Negative  Negative for rash  Neurological: Positive for speech difficulty and numbness (FINGER TIPS SOMETIMES)  Negative for dizziness, tremors, seizures, syncope, facial asymmetry, weakness, light-headedness and headaches  Hematological: Negative  Does not bruise/bleed easily  Psychiatric/Behavioral: Negative  Negative for confusion, hallucinations and sleep disturbance (Staying asleep sometimes)

## 2019-05-29 DIAGNOSIS — I10 HYPERTENSION, UNSPECIFIED TYPE: ICD-10-CM

## 2019-05-29 DIAGNOSIS — I63.9 STROKE (HCC): ICD-10-CM

## 2019-05-29 RX ORDER — METOPROLOL SUCCINATE 25 MG/1
TABLET, EXTENDED RELEASE ORAL
Qty: 30 TABLET | Refills: 2 | Status: SHIPPED | OUTPATIENT
Start: 2019-05-29 | End: 2019-08-22 | Stop reason: SDUPTHER

## 2019-06-29 DIAGNOSIS — I63.9 STROKE (HCC): ICD-10-CM

## 2019-07-01 RX ORDER — CLOPIDOGREL BISULFATE 75 MG/1
TABLET ORAL
Qty: 30 TABLET | Refills: 3 | Status: SHIPPED | OUTPATIENT
Start: 2019-07-01 | End: 2019-11-15 | Stop reason: SDUPTHER

## 2019-08-22 DIAGNOSIS — I10 HYPERTENSION, UNSPECIFIED TYPE: ICD-10-CM

## 2019-08-22 DIAGNOSIS — I63.9 STROKE (HCC): ICD-10-CM

## 2019-08-23 RX ORDER — METOPROLOL SUCCINATE 25 MG/1
TABLET, EXTENDED RELEASE ORAL
Qty: 30 TABLET | Refills: 2 | Status: SHIPPED | OUTPATIENT
Start: 2019-08-23 | End: 2019-11-21 | Stop reason: SDUPTHER

## 2019-09-24 ENCOUNTER — TRANSCRIBE ORDERS (OUTPATIENT)
Dept: ADMINISTRATIVE | Facility: HOSPITAL | Age: 44
End: 2019-09-24

## 2019-09-24 DIAGNOSIS — R07.9 CHEST PAIN, UNSPECIFIED TYPE: Primary | ICD-10-CM

## 2019-09-24 DIAGNOSIS — I69.359 HEMIPLEGIA, DOMINANT SIDE S/P CVA (CEREBROVASCULAR ACCIDENT) (HCC): ICD-10-CM

## 2019-09-24 DIAGNOSIS — I69.359 HEMIPLEGIA, DOMINANT SIDE S/P CVA (CEREBROVASCULAR ACCIDENT) (HCC): Primary | ICD-10-CM

## 2019-11-15 DIAGNOSIS — I63.9 STROKE (HCC): ICD-10-CM

## 2019-11-15 RX ORDER — CLOPIDOGREL BISULFATE 75 MG/1
TABLET ORAL
Qty: 30 TABLET | Refills: 3 | Status: SHIPPED | OUTPATIENT
Start: 2019-11-15 | End: 2020-02-21

## 2019-11-21 DIAGNOSIS — I63.9 STROKE (HCC): ICD-10-CM

## 2019-11-21 DIAGNOSIS — I10 HYPERTENSION, UNSPECIFIED TYPE: ICD-10-CM

## 2019-11-21 RX ORDER — METOPROLOL SUCCINATE 25 MG/1
TABLET, EXTENDED RELEASE ORAL
Qty: 30 TABLET | Refills: 2 | Status: SHIPPED | OUTPATIENT
Start: 2019-11-21

## 2019-11-27 ENCOUNTER — TELEPHONE (OUTPATIENT)
Dept: NEUROLOGY | Facility: CLINIC | Age: 44
End: 2019-11-27

## 2019-11-27 NOTE — TELEPHONE ENCOUNTER
Pt returned call - I informed him that he needs to contact his PCP to acquire an insurance referral to be seen this coming Thursday   He understood what I was asking and will be contacting his primary for referral

## 2020-02-20 DIAGNOSIS — I63.9 STROKE (HCC): ICD-10-CM

## 2020-02-21 RX ORDER — CLOPIDOGREL BISULFATE 75 MG/1
TABLET ORAL
Qty: 30 TABLET | Refills: 0 | Status: SHIPPED | OUTPATIENT
Start: 2020-02-21 | End: 2020-07-12

## 2020-07-11 DIAGNOSIS — I63.9 STROKE (HCC): ICD-10-CM

## 2020-07-12 RX ORDER — CLOPIDOGREL BISULFATE 75 MG/1
TABLET ORAL
Qty: 30 TABLET | Refills: 0 | Status: SHIPPED | OUTPATIENT
Start: 2020-07-12 | End: 2020-08-13

## 2020-08-11 DIAGNOSIS — I63.9 STROKE (HCC): ICD-10-CM

## 2020-08-13 RX ORDER — CLOPIDOGREL BISULFATE 75 MG/1
TABLET ORAL
Qty: 30 TABLET | Refills: 0 | Status: SHIPPED | OUTPATIENT
Start: 2020-08-13

## 2021-11-04 ENCOUNTER — CONSULT (OUTPATIENT)
Dept: NEUROLOGY | Facility: CLINIC | Age: 46
End: 2021-11-04
Payer: COMMERCIAL

## 2021-11-04 VITALS
WEIGHT: 222.6 LBS | BODY MASS INDEX: 35.93 KG/M2 | HEART RATE: 80 BPM | SYSTOLIC BLOOD PRESSURE: 131 MMHG | DIASTOLIC BLOOD PRESSURE: 90 MMHG

## 2021-11-04 DIAGNOSIS — Z72.0 TOBACCO ABUSE: ICD-10-CM

## 2021-11-04 DIAGNOSIS — M54.50 LOW BACK PAIN: ICD-10-CM

## 2021-11-04 DIAGNOSIS — I63.9 STROKE (HCC): Primary | ICD-10-CM

## 2021-11-04 DIAGNOSIS — F10.10 ALCOHOL ABUSE: ICD-10-CM

## 2021-11-04 PROCEDURE — 99214 OFFICE O/P EST MOD 30 MIN: CPT | Performed by: STUDENT IN AN ORGANIZED HEALTH CARE EDUCATION/TRAINING PROGRAM

## 2022-10-27 ENCOUNTER — TELEPHONE (OUTPATIENT)
Dept: NEUROLOGY | Facility: CLINIC | Age: 47
End: 2022-10-27

## 2022-10-27 NOTE — TELEPHONE ENCOUNTER
Called patients number on file, the person who picked up stated I had the wrong number  Called the number a second time received the same person explaining I had the wrong number  Number has been deleted out of chart  Tried calling alterative  Number to ask if they have a current number for patient it went to voice mail  No message left on voice mail because communication consent was not complete  Apt has been canceled

## 2025-02-07 ENCOUNTER — APPOINTMENT (EMERGENCY)
Dept: RADIOLOGY | Facility: HOSPITAL | Age: 50
DRG: 351 | End: 2025-02-07
Payer: COMMERCIAL

## 2025-02-07 ENCOUNTER — APPOINTMENT (INPATIENT)
Dept: ULTRASOUND IMAGING | Facility: HOSPITAL | Age: 50
DRG: 351 | End: 2025-02-07
Payer: COMMERCIAL

## 2025-02-07 ENCOUNTER — HOSPITAL ENCOUNTER (INPATIENT)
Facility: HOSPITAL | Age: 50
LOS: 12 days | Discharge: HOME HEALTH | DRG: 351 | End: 2025-02-19
Admitting: INTERNAL MEDICINE
Payer: COMMERCIAL

## 2025-02-07 ENCOUNTER — APPOINTMENT (EMERGENCY)
Dept: CT IMAGING | Facility: HOSPITAL | Age: 50
DRG: 351 | End: 2025-02-07
Payer: COMMERCIAL

## 2025-02-07 DIAGNOSIS — Z13.9 ENCOUNTER FOR SCREENING INVOLVING SOCIAL DETERMINANTS OF HEALTH (SDOH): ICD-10-CM

## 2025-02-07 DIAGNOSIS — R60.0 BILATERAL LOWER EXTREMITY EDEMA: ICD-10-CM

## 2025-02-07 DIAGNOSIS — R79.89 ELEVATED LFTS: ICD-10-CM

## 2025-02-07 DIAGNOSIS — E80.6 HYPERBILIRUBINEMIA: ICD-10-CM

## 2025-02-07 DIAGNOSIS — E87.8 ELECTROLYTE IMBALANCE: ICD-10-CM

## 2025-02-07 DIAGNOSIS — N17.9 AKI (ACUTE KIDNEY INJURY) (HCC): ICD-10-CM

## 2025-02-07 DIAGNOSIS — R53.1 WEAKNESS: ICD-10-CM

## 2025-02-07 DIAGNOSIS — M62.82 RHABDOMYOLYSIS: Primary | ICD-10-CM

## 2025-02-07 DIAGNOSIS — R19.7 DIARRHEA OF PRESUMED INFECTIOUS ORIGIN: ICD-10-CM

## 2025-02-07 DIAGNOSIS — E83.42 HYPOMAGNESEMIA: ICD-10-CM

## 2025-02-07 DIAGNOSIS — E87.1 HYPONATREMIA: ICD-10-CM

## 2025-02-07 DIAGNOSIS — F10.10 ALCOHOL ABUSE: ICD-10-CM

## 2025-02-07 DIAGNOSIS — K57.92 ACUTE DIVERTICULITIS: ICD-10-CM

## 2025-02-07 DIAGNOSIS — R79.89 ABNORMAL LFTS: ICD-10-CM

## 2025-02-07 DIAGNOSIS — R29.898 WEAKNESS OF LEFT LOWER EXTREMITY: ICD-10-CM

## 2025-02-07 PROBLEM — R16.0 HEPATOMEGALY: Status: ACTIVE | Noted: 2025-02-07

## 2025-02-07 PROBLEM — K76.0 STEATOSIS OF LIVER: Status: ACTIVE | Noted: 2025-02-07

## 2025-02-07 LAB
ALBUMIN SERPL BCG-MCNC: 2.3 G/DL (ref 3.5–5)
ALP SERPL-CCNC: 177 U/L (ref 34–104)
ALT SERPL W P-5'-P-CCNC: 313 U/L (ref 7–52)
AMMONIA PLAS-SCNC: 76 UMOL/L (ref 18–72)
AMORPH URATE CRY URNS QL MICRO: NORMAL
ANION GAP SERPL CALCULATED.3IONS-SCNC: 7 MMOL/L (ref 4–13)
ANION GAP SERPL CALCULATED.3IONS-SCNC: 8 MMOL/L (ref 4–13)
ANION GAP SERPL CALCULATED.3IONS-SCNC: 9 MMOL/L (ref 4–13)
APAP SERPL-MCNC: <2 UG/ML (ref 10–20)
AST SERPL W P-5'-P-CCNC: 866 U/L (ref 13–39)
ATRIAL RATE: 86 BPM
BACTERIA UR QL AUTO: NORMAL /HPF
BASOPHILS # BLD AUTO: 0.07 THOUSANDS/ΜL (ref 0–0.1)
BASOPHILS NFR BLD AUTO: 1 % (ref 0–1)
BILIRUB DIRECT SERPL-MCNC: 3.35 MG/DL (ref 0–0.2)
BILIRUB SERPL-MCNC: 5.69 MG/DL (ref 0.2–1)
BILIRUB UR QL STRIP: NEGATIVE
BUN SERPL-MCNC: 18 MG/DL (ref 5–25)
BUN SERPL-MCNC: 19 MG/DL (ref 5–25)
BUN SERPL-MCNC: 20 MG/DL (ref 5–25)
BUN SERPL-MCNC: 21 MG/DL (ref 5–25)
BUN SERPL-MCNC: 23 MG/DL (ref 5–25)
CALCIUM ALBUM COR SERPL-MCNC: 10.6 MG/DL (ref 8.3–10.1)
CALCIUM SERPL-MCNC: 8.1 MG/DL (ref 8.4–10.2)
CALCIUM SERPL-MCNC: 8.2 MG/DL (ref 8.4–10.2)
CALCIUM SERPL-MCNC: 8.3 MG/DL (ref 8.4–10.2)
CALCIUM SERPL-MCNC: 8.4 MG/DL (ref 8.4–10.2)
CALCIUM SERPL-MCNC: 9.2 MG/DL (ref 8.4–10.2)
CHLORIDE SERPL-SCNC: 100 MMOL/L (ref 96–108)
CHLORIDE SERPL-SCNC: 102 MMOL/L (ref 96–108)
CHLORIDE SERPL-SCNC: 102 MMOL/L (ref 96–108)
CHLORIDE SERPL-SCNC: 98 MMOL/L (ref 96–108)
CHLORIDE SERPL-SCNC: 99 MMOL/L (ref 96–108)
CK SERPL-CCNC: ABNORMAL U/L (ref 39–308)
CK SERPL-CCNC: ABNORMAL U/L (ref 39–308)
CLARITY UR: ABNORMAL
CO2 SERPL-SCNC: 20 MMOL/L (ref 21–32)
CO2 SERPL-SCNC: 20 MMOL/L (ref 21–32)
CO2 SERPL-SCNC: 22 MMOL/L (ref 21–32)
COLOR UR: YELLOW
CREAT SERPL-MCNC: 3.05 MG/DL (ref 0.6–1.3)
CREAT SERPL-MCNC: 3.07 MG/DL (ref 0.6–1.3)
CREAT SERPL-MCNC: 3.22 MG/DL (ref 0.6–1.3)
CREAT SERPL-MCNC: 3.48 MG/DL (ref 0.6–1.3)
CREAT SERPL-MCNC: 3.64 MG/DL (ref 0.6–1.3)
CREAT UR-MCNC: 56.5 MG/DL
CRP SERPL QL: 43.7 MG/L
EOSINOPHIL # BLD AUTO: 0.11 THOUSAND/ΜL (ref 0–0.61)
EOSINOPHIL NFR BLD AUTO: 1 % (ref 0–6)
ERYTHROCYTE [DISTWIDTH] IN BLOOD BY AUTOMATED COUNT: 15.3 % (ref 11.6–15.1)
ERYTHROCYTE [SEDIMENTATION RATE] IN BLOOD: 98 MM/HOUR (ref 0–14)
ETHANOL SERPL-MCNC: <10 MG/DL
FLUAV AG UPPER RESP QL IA.RAPID: NEGATIVE
FLUBV AG UPPER RESP QL IA.RAPID: NEGATIVE
GFR SERPL CREATININE-BSD FRML MDRD: 18 ML/MIN/1.73SQ M
GFR SERPL CREATININE-BSD FRML MDRD: 19 ML/MIN/1.73SQ M
GFR SERPL CREATININE-BSD FRML MDRD: 21 ML/MIN/1.73SQ M
GFR SERPL CREATININE-BSD FRML MDRD: 22 ML/MIN/1.73SQ M
GFR SERPL CREATININE-BSD FRML MDRD: 22 ML/MIN/1.73SQ M
GLUCOSE SERPL-MCNC: 117 MG/DL (ref 65–140)
GLUCOSE SERPL-MCNC: 86 MG/DL (ref 65–140)
GLUCOSE SERPL-MCNC: 86 MG/DL (ref 65–140)
GLUCOSE SERPL-MCNC: 92 MG/DL (ref 65–140)
GLUCOSE SERPL-MCNC: 95 MG/DL (ref 65–140)
GLUCOSE UR STRIP-MCNC: NEGATIVE MG/DL
HCT VFR BLD AUTO: 38.1 % (ref 36.5–49.3)
HGB BLD-MCNC: 13 G/DL (ref 12–17)
HGB UR QL STRIP.AUTO: ABNORMAL
IMM GRANULOCYTES # BLD AUTO: 0.04 THOUSAND/UL (ref 0–0.2)
IMM GRANULOCYTES NFR BLD AUTO: 0 % (ref 0–2)
KETONES UR STRIP-MCNC: NEGATIVE MG/DL
LACTATE SERPL-SCNC: 1.3 MMOL/L (ref 0.5–2)
LEUKOCYTE ESTERASE UR QL STRIP: NEGATIVE
LIPASE SERPL-CCNC: 55 U/L (ref 11–82)
LYMPHOCYTES # BLD AUTO: 1.15 THOUSANDS/ΜL (ref 0.6–4.47)
LYMPHOCYTES NFR BLD AUTO: 12 % (ref 14–44)
MAGNESIUM SERPL-MCNC: 1.8 MG/DL (ref 1.9–2.7)
MCH RBC QN AUTO: 34.6 PG (ref 26.8–34.3)
MCHC RBC AUTO-ENTMCNC: 34.1 G/DL (ref 31.4–37.4)
MCV RBC AUTO: 101 FL (ref 82–98)
MONOCYTES # BLD AUTO: 0.81 THOUSAND/ΜL (ref 0.17–1.22)
MONOCYTES NFR BLD AUTO: 8 % (ref 4–12)
NEUTROPHILS # BLD AUTO: 7.46 THOUSANDS/ΜL (ref 1.85–7.62)
NEUTS SEG NFR BLD AUTO: 78 % (ref 43–75)
NITRITE UR QL STRIP: NEGATIVE
NON-SQ EPI CELLS URNS QL MICRO: NORMAL /HPF
NRBC BLD AUTO-RTO: 0 /100 WBCS
P AXIS: 41 DEGREES
PH UR STRIP.AUTO: 6 [PH]
PHOSPHATE SERPL-MCNC: 2.7 MG/DL (ref 2.7–4.5)
PLATELET # BLD AUTO: 105 THOUSANDS/UL (ref 149–390)
PLATELET # BLD AUTO: 141 THOUSANDS/UL (ref 149–390)
PMV BLD AUTO: 11.4 FL (ref 8.9–12.7)
PMV BLD AUTO: 11.6 FL (ref 8.9–12.7)
POTASSIUM SERPL-SCNC: 3.1 MMOL/L (ref 3.5–5.3)
POTASSIUM SERPL-SCNC: 3.5 MMOL/L (ref 3.5–5.3)
POTASSIUM SERPL-SCNC: 4.3 MMOL/L (ref 3.5–5.3)
PR INTERVAL: 122 MS
PROCALCITONIN SERPL-MCNC: 3.09 NG/ML
PROT SERPL-MCNC: 5.9 G/DL (ref 6.4–8.4)
PROT UR STRIP-MCNC: ABNORMAL MG/DL
QRS AXIS: 22 DEGREES
QRSD INTERVAL: 84 MS
QT INTERVAL: 420 MS
QTC INTERVAL: 502 MS
RBC # BLD AUTO: 3.76 MILLION/UL (ref 3.88–5.62)
RBC #/AREA URNS AUTO: NORMAL /HPF
SALICYLATES SERPL-MCNC: <5 MG/DL (ref 3–20)
SARS-COV+SARS-COV-2 AG RESP QL IA.RAPID: NEGATIVE
SODIUM SERPL-SCNC: 127 MMOL/L (ref 135–147)
SODIUM SERPL-SCNC: 130 MMOL/L (ref 135–147)
SODIUM SERPL-SCNC: 130 MMOL/L (ref 135–147)
SODIUM SERPL-SCNC: 131 MMOL/L (ref 135–147)
SODIUM SERPL-SCNC: 131 MMOL/L (ref 135–147)
SODIUM UR-SCNC: 21 MMOL/L
SP GR UR STRIP.AUTO: 1.01 (ref 1–1.03)
T WAVE AXIS: 55 DEGREES
TSH SERPL DL<=0.05 MIU/L-ACNC: 1.19 UIU/ML (ref 0.45–4.5)
UROBILINOGEN UR STRIP-ACNC: <2 MG/DL
VENTRICULAR RATE: 86 BPM
WBC # BLD AUTO: 9.64 THOUSAND/UL (ref 4.31–10.16)
WBC #/AREA URNS AUTO: NORMAL /HPF

## 2025-02-07 PROCEDURE — 99223 1ST HOSP IP/OBS HIGH 75: CPT | Performed by: FAMILY MEDICINE

## 2025-02-07 PROCEDURE — 93005 ELECTROCARDIOGRAM TRACING: CPT

## 2025-02-07 PROCEDURE — 87506 IADNA-DNA/RNA PROBE TQ 6-11: CPT | Performed by: NURSE PRACTITIONER

## 2025-02-07 PROCEDURE — 86705 HEP B CORE ANTIBODY IGM: CPT | Performed by: FAMILY MEDICINE

## 2025-02-07 PROCEDURE — 80048 BASIC METABOLIC PNL TOTAL CA: CPT | Performed by: FAMILY MEDICINE

## 2025-02-07 PROCEDURE — 87811 SARS-COV-2 COVID19 W/OPTIC: CPT

## 2025-02-07 PROCEDURE — 86803 HEPATITIS C AB TEST: CPT | Performed by: FAMILY MEDICINE

## 2025-02-07 PROCEDURE — 96365 THER/PROPH/DIAG IV INF INIT: CPT

## 2025-02-07 PROCEDURE — 87804 INFLUENZA ASSAY W/OPTIC: CPT

## 2025-02-07 PROCEDURE — 71045 X-RAY EXAM CHEST 1 VIEW: CPT

## 2025-02-07 PROCEDURE — 80053 COMPREHEN METABOLIC PANEL: CPT

## 2025-02-07 PROCEDURE — 87209 SMEAR COMPLEX STAIN: CPT | Performed by: NURSE PRACTITIONER

## 2025-02-07 PROCEDURE — 99285 EMERGENCY DEPT VISIT HI MDM: CPT

## 2025-02-07 PROCEDURE — 76705 ECHO EXAM OF ABDOMEN: CPT

## 2025-02-07 PROCEDURE — 99255 IP/OBS CONSLTJ NEW/EST HI 80: CPT | Performed by: STUDENT IN AN ORGANIZED HEALTH CARE EDUCATION/TRAINING PROGRAM

## 2025-02-07 PROCEDURE — 74176 CT ABD & PELVIS W/O CONTRAST: CPT

## 2025-02-07 PROCEDURE — 93010 ELECTROCARDIOGRAM REPORT: CPT | Performed by: INTERNAL MEDICINE

## 2025-02-07 PROCEDURE — 82140 ASSAY OF AMMONIA: CPT

## 2025-02-07 PROCEDURE — 87340 HEPATITIS B SURFACE AG IA: CPT | Performed by: FAMILY MEDICINE

## 2025-02-07 PROCEDURE — 86704 HEP B CORE ANTIBODY TOTAL: CPT | Performed by: FAMILY MEDICINE

## 2025-02-07 PROCEDURE — 84443 ASSAY THYROID STIM HORMONE: CPT

## 2025-02-07 PROCEDURE — 86140 C-REACTIVE PROTEIN: CPT

## 2025-02-07 PROCEDURE — 80179 DRUG ASSAY SALICYLATE: CPT

## 2025-02-07 PROCEDURE — 83735 ASSAY OF MAGNESIUM: CPT

## 2025-02-07 PROCEDURE — 84145 PROCALCITONIN (PCT): CPT

## 2025-02-07 PROCEDURE — 82550 ASSAY OF CK (CPK): CPT

## 2025-02-07 PROCEDURE — 87177 OVA AND PARASITES SMEARS: CPT | Performed by: NURSE PRACTITIONER

## 2025-02-07 PROCEDURE — 70450 CT HEAD/BRAIN W/O DYE: CPT

## 2025-02-07 PROCEDURE — 80074 ACUTE HEPATITIS PANEL: CPT

## 2025-02-07 PROCEDURE — 76770 US EXAM ABDO BACK WALL COMP: CPT

## 2025-02-07 PROCEDURE — 81001 URINALYSIS AUTO W/SCOPE: CPT

## 2025-02-07 PROCEDURE — 85652 RBC SED RATE AUTOMATED: CPT

## 2025-02-07 PROCEDURE — 82077 ASSAY SPEC XCP UR&BREATH IA: CPT

## 2025-02-07 PROCEDURE — 80143 DRUG ASSAY ACETAMINOPHEN: CPT

## 2025-02-07 PROCEDURE — 99255 IP/OBS CONSLTJ NEW/EST HI 80: CPT

## 2025-02-07 PROCEDURE — 82550 ASSAY OF CK (CPK): CPT | Performed by: FAMILY MEDICINE

## 2025-02-07 PROCEDURE — 85049 AUTOMATED PLATELET COUNT: CPT | Performed by: FAMILY MEDICINE

## 2025-02-07 PROCEDURE — 80048 BASIC METABOLIC PNL TOTAL CA: CPT

## 2025-02-07 PROCEDURE — 85025 COMPLETE CBC W/AUTO DIFF WBC: CPT

## 2025-02-07 PROCEDURE — 83690 ASSAY OF LIPASE: CPT

## 2025-02-07 PROCEDURE — 84100 ASSAY OF PHOSPHORUS: CPT

## 2025-02-07 PROCEDURE — 82248 BILIRUBIN DIRECT: CPT | Performed by: STUDENT IN AN ORGANIZED HEALTH CARE EDUCATION/TRAINING PROGRAM

## 2025-02-07 PROCEDURE — 83993 ASSAY FOR CALPROTECTIN FECAL: CPT | Performed by: NURSE PRACTITIONER

## 2025-02-07 PROCEDURE — 82653 EL-1 FECAL QUANTITATIVE: CPT | Performed by: NURSE PRACTITIONER

## 2025-02-07 PROCEDURE — 82570 ASSAY OF URINE CREATININE: CPT | Performed by: FAMILY MEDICINE

## 2025-02-07 PROCEDURE — 36415 COLL VENOUS BLD VENIPUNCTURE: CPT

## 2025-02-07 PROCEDURE — 83605 ASSAY OF LACTIC ACID: CPT

## 2025-02-07 PROCEDURE — 84300 ASSAY OF URINE SODIUM: CPT | Performed by: FAMILY MEDICINE

## 2025-02-07 RX ORDER — CEFTRIAXONE 1 G/50ML
1000 INJECTION, SOLUTION INTRAVENOUS EVERY 24 HOURS
Status: DISCONTINUED | OUTPATIENT
Start: 2025-02-07 | End: 2025-02-11

## 2025-02-07 RX ORDER — NICOTINE 21 MG/24HR
1 PATCH, TRANSDERMAL 24 HOURS TRANSDERMAL DAILY
Status: DISCONTINUED | OUTPATIENT
Start: 2025-02-07 | End: 2025-02-19 | Stop reason: HOSPADM

## 2025-02-07 RX ORDER — HEPARIN SODIUM 5000 [USP'U]/ML
5000 INJECTION, SOLUTION INTRAVENOUS; SUBCUTANEOUS EVERY 8 HOURS SCHEDULED
Status: DISCONTINUED | OUTPATIENT
Start: 2025-02-07 | End: 2025-02-19 | Stop reason: HOSPADM

## 2025-02-07 RX ORDER — PANTOPRAZOLE SODIUM 40 MG/1
40 TABLET, DELAYED RELEASE ORAL
Status: DISCONTINUED | OUTPATIENT
Start: 2025-02-07 | End: 2025-02-19 | Stop reason: HOSPADM

## 2025-02-07 RX ORDER — SODIUM CHLORIDE 9 MG/ML
150 INJECTION, SOLUTION INTRAVENOUS CONTINUOUS
Status: DISCONTINUED | OUTPATIENT
Start: 2025-02-07 | End: 2025-02-07

## 2025-02-07 RX ORDER — SODIUM CHLORIDE, SODIUM GLUCONATE, SODIUM ACETATE, POTASSIUM CHLORIDE, MAGNESIUM CHLORIDE, SODIUM PHOSPHATE, DIBASIC, AND POTASSIUM PHOSPHATE .53; .5; .37; .037; .03; .012; .00082 G/100ML; G/100ML; G/100ML; G/100ML; G/100ML; G/100ML; G/100ML
125 INJECTION, SOLUTION INTRAVENOUS CONTINUOUS
Status: DISCONTINUED | OUTPATIENT
Start: 2025-02-07 | End: 2025-02-07

## 2025-02-07 RX ORDER — FOLIC ACID 1 MG/1
1 TABLET ORAL DAILY
Status: DISCONTINUED | OUTPATIENT
Start: 2025-02-08 | End: 2025-02-19 | Stop reason: HOSPADM

## 2025-02-07 RX ORDER — SODIUM CHLORIDE, SODIUM GLUCONATE, SODIUM ACETATE, POTASSIUM CHLORIDE, MAGNESIUM CHLORIDE, SODIUM PHOSPHATE, DIBASIC, AND POTASSIUM PHOSPHATE .53; .5; .37; .037; .03; .012; .00082 G/100ML; G/100ML; G/100ML; G/100ML; G/100ML; G/100ML; G/100ML
1000 INJECTION, SOLUTION INTRAVENOUS ONCE
Status: COMPLETED | OUTPATIENT
Start: 2025-02-07 | End: 2025-02-07

## 2025-02-07 RX ORDER — SODIUM CHLORIDE 9 MG/ML
100 INJECTION, SOLUTION INTRAVENOUS CONTINUOUS
Status: DISCONTINUED | OUTPATIENT
Start: 2025-02-07 | End: 2025-02-08

## 2025-02-07 RX ORDER — CLOPIDOGREL BISULFATE 75 MG/1
75 TABLET ORAL DAILY
Status: DISCONTINUED | OUTPATIENT
Start: 2025-02-07 | End: 2025-02-19 | Stop reason: HOSPADM

## 2025-02-07 RX ORDER — METRONIDAZOLE 500 MG/1
500 TABLET ORAL EVERY 8 HOURS SCHEDULED
Status: DISCONTINUED | OUTPATIENT
Start: 2025-02-07 | End: 2025-02-11

## 2025-02-07 RX ORDER — METOPROLOL SUCCINATE 25 MG/1
25 TABLET, EXTENDED RELEASE ORAL DAILY
Status: DISCONTINUED | OUTPATIENT
Start: 2025-02-07 | End: 2025-02-19 | Stop reason: HOSPADM

## 2025-02-07 RX ORDER — LANOLIN ALCOHOL/MO/W.PET/CERES
100 CREAM (GRAM) TOPICAL DAILY
Status: DISCONTINUED | OUTPATIENT
Start: 2025-02-08 | End: 2025-02-11

## 2025-02-07 RX ADMIN — SODIUM CHLORIDE, SODIUM GLUCONATE, SODIUM ACETATE, POTASSIUM CHLORIDE, MAGNESIUM CHLORIDE, SODIUM PHOSPHATE, DIBASIC, AND POTASSIUM PHOSPHATE 1000 ML: .53; .5; .37; .037; .03; .012; .00082 INJECTION, SOLUTION INTRAVENOUS at 07:08

## 2025-02-07 RX ADMIN — THIAMINE HYDROCHLORIDE: 100 INJECTION, SOLUTION INTRAMUSCULAR; INTRAVENOUS at 08:18

## 2025-02-07 RX ADMIN — CLOPIDOGREL 75 MG: 75 TABLET ORAL at 08:20

## 2025-02-07 RX ADMIN — CEFTRIAXONE 1000 MG: 1 INJECTION, SOLUTION INTRAVENOUS at 08:36

## 2025-02-07 RX ADMIN — SODIUM CHLORIDE, SODIUM GLUCONATE, SODIUM ACETATE, POTASSIUM CHLORIDE, MAGNESIUM CHLORIDE, SODIUM PHOSPHATE, DIBASIC, AND POTASSIUM PHOSPHATE 1000 ML: .53; .5; .37; .037; .03; .012; .00082 INJECTION, SOLUTION INTRAVENOUS at 05:52

## 2025-02-07 RX ADMIN — AMPICILLIN SODIUM AND SULBACTAM SODIUM 3 G: 2; 1 INJECTION, POWDER, FOR SOLUTION INTRAMUSCULAR; INTRAVENOUS at 08:15

## 2025-02-07 RX ADMIN — METRONIDAZOLE 500 MG: 500 TABLET ORAL at 14:04

## 2025-02-07 RX ADMIN — METRONIDAZOLE 500 MG: 500 TABLET ORAL at 08:21

## 2025-02-07 RX ADMIN — SODIUM CHLORIDE 150 ML/HR: 0.9 INJECTION, SOLUTION INTRAVENOUS at 08:18

## 2025-02-07 RX ADMIN — METRONIDAZOLE 500 MG: 500 TABLET ORAL at 23:16

## 2025-02-07 RX ADMIN — METOPROLOL SUCCINATE 25 MG: 25 TABLET, EXTENDED RELEASE ORAL at 08:21

## 2025-02-07 NOTE — CASE MANAGEMENT
Case Management Assessment    Patient name Emiliano Rodriguez  Location /404-01 MRN [id number]  : 1975 Date 2025       Current Admission Date: 2025  Current Admission Diagnosis:Gastroesophageal reflux disease without esophagitis   Patient Active Problem List    Diagnosis Date Noted Date Diagnosed    Acute renal failure (ARF) (HCC) 2025     Hyponatremia 2025     Rhabdomyolysis 2025     Abnormal LFTs 2025     Acute diverticulitis 2025     Diarrhea 2025     Steatosis of liver 2025     Hepatomegaly 2025     CVA (cerebral vascular accident) (HCC) 2018     Gastroesophageal reflux disease without esophagitis 2018     TIA (transient ischemic attack) 2018     Alcohol abuse 2018     Tobacco abuse 2018       LOS (days): 0  Geometric Mean LOS (GMLOS) (days): 3  Days to GMLOS:2.7     OBJECTIVE:    Risk of Unplanned Readmission Score: 12.05         Current admission status: Inpatient       Preferred Pharmacy:   [id number] [address] [address]  Phone: [phone] Fax: [phone]    Primary Care Provider: Emmett Jimenez DO    Primary Insurance: [employer]  Secondary Insurance:     ASSESSMENT:  Active Health Care Proxies       Laura Rodriguez Health Care Representative - Mother   Primary Phone: [phone] (Mobile)                 Advance Directives  Does patient have a Health Care POA?: No  Was patient offered paperwork?: Yes (declined)  Does patient currently have a Health Care decision maker?: Yes, please see Health Care Proxy section  Does patient have Advance Directives?: No  Was patient offered paperwork?: Yes (declined)  Primary Contact: Laura Rodriguez (Mother)              Patient Information  Admitted from:: Home  Mental Status: Alert  During Assessment patient was accompanied by: Not accompanied during assessment  Assessment  information provided by:: Patient  Primary Caregiver: Self  Support Systems: Self, Friend, Parent  County [facility] Residence: [facility]  What city do you live in?: [place]  Home entry access options. Select all that apply.: Stairs  Number of steps to enter home.: 6  Do the steps have railings?: Yes  Type of Current Residence: [address] home  Upon entering residence, is there a bedroom on the main floor (no further steps)?: Yes  Upon entering residence, is there a bathroom on the main floor (no further steps)?: Yes  Living Arrangements: Lives w/ Friend  Is patient a ?: No    Activities of Daily Living Prior to Admission  Functional Status: Independent  Completes ADLs independently?: Yes  Ambulates independently?: Yes  Does patient use assisted devices?: Yes  Assisted Devices (DME) used: Walker  Does patient currently own DME?: Yes  What DME does the patient currently own?: Walker  Does patient have a history of Outpatient Therapy (PT/OT)?: No  Does the patient have a history of Short-Term Rehab?: No  Does patient have a history of HHC?: No  Does patient currently have HHC?: No         Patient Information Continued  Income Source: Unemployed  Does patient have prescription coverage?: Yes  Does patient receive dialysis treatments?: No  Does patient have a history of substance abuse?: Yes  Historical substance use preference: Alcohol/ETOH  History of Withdrawal Symptoms: Denies past symptoms  Is patient currently in treatment for substance abuse?: Patient provided treatment options. (pt does not want treatment but provided pt with warm hand off information if he changes his mind. also provided with list of AA meetings in [place])  Does patient have a history of Mental Health Diagnosis?: No         Means of Transportation  Means of Transport to Appts:: Friends  Cm met with the patient to evaluate the patients prior function and living situation and any barriers to d/c and form a safe d/c plan. Cm also evaluated  the patient for any services in the home or needs for services. CM also discussed with patient that their preferences will be taken into account/consideration.  Pt admitted with ARF, diverticulitis, hyponatremia, rhabdomyolysis. Pt has history of daily alcohol use. Pt declining information for cessation but still provided warm hand off and AA meeting list if he should change his mind. CM following.

## 2025-02-07 NOTE — ASSESSMENT & PLAN NOTE
Endorses at least 5 beers a day, although I suspect this is understated  Folic acid, thiamine  Initiate CIWA protocol

## 2025-02-07 NOTE — Clinical Note
Case was discussed with Jj GRIFFITHS and the patient's admission status was agreed to be Admission Status: inpatient status to the service of Dr. Katz .

## 2025-02-07 NOTE — ASSESSMENT & PLAN NOTE
Likely due to prerenal azotemia and rhabdomyolysis, case was reviewed with nephrology due to hyponatremia and concern for overcorrection    Patient received 1.5 L fluid bolus, placed at 150 cc/hour, repeat sodium 130, decreased IV flow rate to 100cc/hr   Check urine electrolytes  Check renal US with PVR   Initiate Urinary retention protocol   Nephro consult     Acute diverticulitis  Ceftriaxone / Flagy  Check C.diff , Enteric panel, Giardia, calprotectic   GI on board

## 2025-02-07 NOTE — ASSESSMENT & PLAN NOTE
After a discussion with the attending, Dr. Stroud, at this point in time we feel that his current abnormal LFTs are most likely related to alcoholic steatosis, possibly alcoholic hepatitis, with the rhabdomyolysis contributing to the AST elevation, especially without any significant evidence of CBD dilatation or obstruction.    We do not see any current need to consider endoscopic procedures, especially without the evidence of any overt GI bleeding, however, we do feel that outpatient EGD/colonoscopy is warranted secondary to the history of alcohol abuse, steatosis of the liver, hepatomegaly, diverticulitis, and need for colon cancer screening.    Proceed with right upper quadrant ultrasound with liver Dopplers as ordered.    Acute hepatitis panel was already ordered and is in process.    Ordered AMA, ASMA, YAZAN, ceruloplasmin, IgG, IgA, and IgM to fully evaluate and rule out any other underlying autoimmune component to the abnormal LFTs.    Continue to monitor LFT's.    Continue current diet as ordered.   Continue to maintain a large bore IV.  Continue to monitor hemoglobin and transfuse as per protocol.   Continue with serial abdominal exams.   Continue to monitor stool output for any overt signs of GI bleeding.

## 2025-02-07 NOTE — ASSESSMENT & PLAN NOTE
Likely due to prerenal azotemia and rhabdomyolysis, case was reviewed with nephrology due to hyponatremia and concern for overcorrection    Patient received 1.5 L fluid bolus, placed at 150 cc/hour, repeat sodium 130, decreased IV flow rate to 100cc/hr   Check urine electrolytes  Check renal US with PVR   Initiate Urinary retention protocol   Nephro consult

## 2025-02-07 NOTE — ASSESSMENT & PLAN NOTE
Discussed recommendations in regards to fatty liver including:   Strict control of contributing comorbidities (obesity, prediabetes/diabetes, hypertension, and hypertriglyceridemia).  Weight loss of approx 10-15% of patient's current body weight over a period of 6-12 months through low fat diet and cardiovascular exercise as tolerated.   Limiting alcohol consumption, preferably complete abstinence.  Monitor hepatic function every 6 months with routine labs.

## 2025-02-07 NOTE — ASSESSMENT & PLAN NOTE
Patient states he's been in the chair for 2 days , states my legs dont work  CK 19K, received 1.5 L NS bolus, now on 100cc/hr   Continue to trend CK and consult nephro

## 2025-02-07 NOTE — CONSULTS
Consultation - Gastroenterology   Name: Emiliano Rodriguez 49 y.o. male I MRN: [id number]  Unit/Bed#: RM06 I Date of Admission: 2/7/2025   Date of Service: 2/7/2025 I Hospital Day: 0   Inpatient consult to gastroenterology  Consult performed by: LAURIE Arauz  Consult ordered by: Ankita Katz MD        Physician Requesting Evaluation: Timo Woodruff DO   Reason for Evaluation / Principal Problem: Abnormal LFTs, acute diverticulitis, diarrhea    Assessment & Plan  Abnormal LFTs  After a discussion with the attending, Dr. Stroud, at this point in time we feel that his current abnormal LFTs are most likely related to alcoholic steatosis, possibly alcoholic hepatitis, with the rhabdomyolysis contributing to the AST elevation, especially without any significant evidence of CBD dilatation or obstruction.    We do not see any current need to consider endoscopic procedures, especially without the evidence of any overt GI bleeding, however, we do feel that outpatient EGD/colonoscopy is warranted secondary to the history of alcohol abuse, steatosis of the liver, hepatomegaly, diverticulitis, and need for colon cancer screening.    Proceed with right upper quadrant ultrasound with liver Dopplers as ordered.    Acute hepatitis panel was already ordered and is in process.    Ordered AMA, ASMA, YAZAN, ceruloplasmin, IgG, IgA, and IgM to fully evaluate and rule out any other underlying autoimmune component to the abnormal LFTs.    Continue to monitor LFT's.    Continue current diet as ordered.   Continue to maintain a large bore IV.  Continue to monitor hemoglobin and transfuse as per protocol.   Continue with serial abdominal exams.   Continue to monitor stool output for any overt signs of GI bleeding.   Steatosis of liver  Discussed recommendations in regards to fatty liver including:   Strict control of contributing comorbidities (obesity, prediabetes/diabetes, hypertension, and hypertriglyceridemia).  Weight loss of  approx 10-15% of patient's current body weight over a period of 6-12 months through low fat diet and cardiovascular exercise as tolerated.   Limiting alcohol consumption, preferably complete abstinence.  Monitor hepatic function every 6 months with routine labs.   Hepatomegaly  See above.  Alcohol abuse  See above.  Rhabdomyolysis  See above.  Diarrhea  Stool studies ordered: Giardia lamblia, EIA, O&P, C. difficile, Said, pancreatic elastase, fecal calprotectin, and ceruloplasmin ordered to rule out any kind of infectious, parasitic, or the possibility of EPI secondary to the longstanding history of loose stools.    Continue Rocephin and Flagyl as ordered.  Acute diverticulitis  Continue Rocephin and Flagyl as ordered.  Outpatient colonoscopy in 6 to 8 weeks.  Gastroesophageal reflux disease without esophagitis  Continue PPI as ordered.   Continue PRN antiemetics as ordered.   Acute renal failure (ARF) (HCC)  As per primary team.  Hyponatremia  As per primary team.    Continue rest of medications as per primary team.   Continue supportive care.     Gastroenterology service will follow.    History of Present Illness   HPI:  Emiliano Rodriguez is a 49 y.o. male with a past medical history of GERD, TIA on Plavix, alcohol abuse and tobacco abuse who presented to the ED today for worsening lower extremity weakness for the past 1 to 2 weeks.  The patient reports that he does deal with chronic loose stools, he has had significantly worsening diarrhea over the past few days especially but had recently been hospitalized with evidence of diverticulitis and had been on antibiotics with some improvement, but he feels that over the past 2 to 3 days it has worsened.  He reports that he was requiring a walker just to ambulate back and forth from the couch to the bathroom and today he could not get up off the couch at all and that is why he came in.    The patient currently denies any reflux, nausea, dysphagia, vomiting, decreased  "appetite, unplanned weight loss, or abdominal pain.    The patient reports that they are currently experiencing worsening diarrhea, but, denies any  constipation, straining, melena or bloody stools. Last BM: Last night. Flatus: Yes.    The patient is currently tolerating a regular diet without any issues.    Colon Cancer  Personal: Denied  Family: Denied    Any Types of Cancer  Personal: Denied  Family: Mother; Breast    Liver Disease  Personal: Fatty Liver  Family: Denied    Tobacco/Vapin.5 PPD  ETOH: 2-3 beers per day and \"occasional,\" liquor daily. Hx of ETOH: Yes  Marijuana: Denied  Illicit Drug Use: Denied Hx of Illicit Drug Use: Denied  Tattoos: Yes. Professionally Done: Yes  Hx of Incarceration: Denied  Hx of Blood Transfusion: Denied  Hx of  Service: Denied    Meds: Protonix 40 mg daily.  Daily NSAID Use: Denied  Daily Tylenol Use: Denied  Any New Meds: Unsure.    Imaging: (25) CT scan of the abdomen and pelvis without contrast: Acute sigmoid diverticulitis. Inflamed sigmoid abuts the dome of the urinary bladder, with associated bladder wall thickening raising the question of developing fistula.     Hepatosplenomegaly with hepatic steatosis.    Endoscopy History: EGD: (None):     COLONOSCOPY: (None):     DUE: NOW    Review of Systems  I have reviewed the patient's PMH, PSH, Social History, Family History, Meds, and Allergies    Objective :  Temp:  [97.5 °F (36.4 °C)-98.1 °F (36.7 °C)] 97.5 °F (36.4 °C)  HR:  [73-83] 78  BP: (114-122)/(75-83) 114/79  Resp:  [18-22] 22  SpO2:  [91 %-96 %] 91 %  O2 Device: None (Room air)    Physical Exam  Exam:  Oral mucosa normal upon visual inspection, without any sores, lesions, or ulcerations. Sclera without icterus and benign. Lung sounds clear to auscultation b/l. Normal S1 & S2 upon exam. Abdomen is round, obese, soft, nontender, with faint bowel sounds x 4.  No edema noted of the b/l lower extremities upon exam today. Skin is non-icteric.       Lab " Results: I have reviewed the following results:CBC/BMP:   .     02/07/25  0453 02/07/25  0547 02/07/25  0725 02/07/25  0813   WBC 9.64  --   --   --    HGB 13.0  --   --   --    HCT 38.1  --   --   --    *  --  105*  --    SODIUM 127*  --   --  130*   K 3.5  --   --  3.5   CL 98  --   --  99   CO2 20*  --   --  22   BUN 18  --   --  19   CREATININE 3.07*  --   --  3.05*   GLUC 95  --   --  86   MG  --  1.8*  --   --    PHOS  --  2.7  --   --     , LFTs:   .     02/07/25 0453   *   *   ALB 2.3*   TBILI 5.69*   ALKPHOS 177*        Imaging Results Review: I reviewed radiology reports from this admission including: CT abdomen/pelvis.  Other Study Results Review: No additional pertinent studies reviewed.

## 2025-02-07 NOTE — PLAN OF CARE
Problem: RESPIRATORY - ADULT  Goal: Achieves optimal ventilation and oxygenation  Description: INTERVENTIONS:  - Assess for changes in respiratory status  - Assess for changes in mentation and behavior  - Position to facilitate oxygenation and minimize respiratory effort  - Oxygen administered by appropriate delivery if ordered  - Initiate smoking cessation education as indicated  - Encourage broncho-pulmonary hygiene including cough, deep breathe, Incentive Spirometry  - Assess the need for suctioning and aspirate as needed  - Assess and instruct to report SOB or any respiratory difficulty  - Respiratory Therapy support as indicated  Outcome: Progressing     Problem: SKIN/TISSUE INTEGRITY - ADULT  Goal: Skin Integrity remains intact(Skin Breakdown Prevention)  Description: Assess:    Toileting:  -Offer bedside commode    Outcome: Progressing

## 2025-02-07 NOTE — CONSULTS
Consultation - Nephrology   Emiliano Rodriguez 49 y.o. male MRN: [id number]  Unit/Bed#: JONATHAN Encounter: [id number]    ASSESSMENT/PLAN:    GUNJAN (POA)  -Baseline creatinine 0.5 to 0.6 mg/dL  -Creatinine on admission 3.07 mg/dL, most recent creatinine 3.22 mg/dL  -Etiology: Rhabdomyolysis, volume depletion  -UA: Large blood, 0-1 RBCs  -Renal imagin.6 cm exophytic cyst in the left kidney, 5 mm stone with no hydronephrosis.  Recommended follow-up ultrasound pending.  -Continue with normal saline infusion  -Avoid hypotension, avoid nephrotoxins, avoid NSAIDS  -Trend BMP    Hypertension/blood pressure  -OP regimen: Metoprolol succinate 25 mg once daily  -Current regimen: As above  -Recent blood pressures: Acceptable    Hyponatremia  -Etiology: Possibly related to beer Poto max, poor solute intake, GUNJAN, volume depletion  -Treatment plan: Continue with normal saline infusion.,  Admission sodium was 127, aim for correction of 4 to 6 mmol/L in 24 hours.  Goal sodium level tomorrow morning would be around 133.  Monitor BMP every 4 hours    Rhabdomyolysis  CK level on admission 19,000.  Continue to trend CK level, continue with IV fluids    Acute diverticulitis  Per gastroenterology      HISTORY OF PRESENT ILLNESS:  Requesting Physician: Timo Woodruff DO  Reason for Consult: Acute kidney injury    Emiliano Rodriguez is a 49 y.o. year old male with a past medical history of hypertension, CVA, chronic diastolic congestive heart failure, alcohol use, GERD, tobacco use who was admitted to [facility] after presenting with weakness to bilateral lower extremities.  Patient states that he spent the whole past week on his couch due to leg weakness.  He has been having some muscle weakness, pain, diarrhea and tea colored urine.  States that appetite has been normal during this time period.  On a typical day he drinks around 5-6 beers per day, states that also recently he has been drinking more Gatorade.  On admission his labs were  significant for sodium 127, creatinine of 3 and CK of 19,000.  A renal consultation is requested today for assistance in the management of acute kidney injury.    PAST MEDICAL HISTORY:  Past Medical History:   Diagnosis Date    GERD (gastroesophageal reflux disease)     TIA (transient ischemic attack)        PAST SURGICAL HISTORY:  History reviewed. No pertinent surgical history.    ALLERGIES:  No Known Allergies    SOCIAL HISTORY:  Social History     Substance and Sexual Activity   Alcohol Use Yes    Alcohol/week: 42.0 standard drinks of alcohol    Types: 42 Cans of beer per week    Comment: 6 pack a day     Social History     Substance and Sexual Activity   Drug Use No     Social History     Tobacco Use   Smoking Status Every Day    Current packs/day: 2.00    Average packs/day: 2.0 packs/day for 25.0 years (50.0 ttl pk-yrs)    Types: Cigarettes   Smokeless Tobacco Never       FAMILY HISTORY:  Family History   Problem Relation Age of Onset    No Known Problems Mother     No Known Problems Father        MEDICATIONS:    Current Facility-Administered Medications:     cefTRIAXone (ROCEPHIN) IVPB (premix in dextrose) 1,000 mg 50 mL, 1,000 mg, Intravenous, Q24H, Ankita Katz MD, Stopped at 02/07/25 0906    clopidogrel (PLAVIX) tablet 75 mg, 75 mg, Oral, Daily, Ankita Katz MD, 75 mg at 02/07/25 0820    [START ON 2/8/2025] folic acid (FOLVITE) tablet 1 mg, 1 mg, Oral, Daily, Ankita Katz MD    heparin (porcine) subcutaneous injection 5,000 Units, 5,000 Units, Subcutaneous, Q8H [facility] **AND** [COMPLETED] Platelet count, , , Once, Ankita Katz MD    metoprolol succinate (TOPROL-XL) 24 hr tablet 25 mg, 25 mg, Oral, Daily, Ankita Katz MD, 25 mg at 02/07/25 0821    metroNIDAZOLE (FLAGYL) tablet 500 mg, 500 mg, Oral, Q8H DAVION, Ankita Katz MD, 500 mg at 02/07/25 1404    nicotine (NICODERM CQ) 21 mg/24 hr TD 24 hr patch 1 patch, 1 patch, Transdermal, Daily, Ankita Katz MD    pantoprazole (PROTONIX) EC tablet 40 mg,  40 mg, Oral, Early Morning, Ankita Katz MD    sodium chloride 0.9 % infusion, 100 mL/hr, Intravenous, Continuous, Ankita Katz MD, Last Rate: 100 mL/hr at 02/07/25 1030, 100 mL/hr at 02/07/25 1030    [START ON 2/8/2025] thiamine tablet 100 mg, 100 mg, Oral, Daily, Ankita Katz MD    Current Outpatient Medications:     clopidogrel (PLAVIX) 75 mg tablet, take 1 tablet by mouth once daily, Disp: 30 tablet, Rfl: 0    folic acid (FOLVITE) 1 mg tablet, Take 1 tablet (1 mg total) by mouth daily, Disp: 30 tablet, Rfl: 0    metoprolol succinate (TOPROL-XL) 25 mg 24 hr tablet, take 1 tablet by mouth once daily, Disp: 30 tablet, Rfl: 2    omeprazole (PriLOSEC) 20 mg delayed release capsule, Take 20 mg by mouth daily, Disp: , Rfl:     thiamine 100 MG tablet, Take 1 tablet (100 mg total) by mouth daily, Disp: 30 tablet, Rfl: 0    atorvastatin (LIPITOR) 80 mg tablet, Take 1 tablet (80 mg total) by mouth every evening, Disp: 30 tablet, Rfl: 1    dextromethorphan-quinidine (NUEDEXTA) 20-10 mg, Take 1 tab daily x 1 week, then 1 tab BID, Disp: 60 capsule, Rfl: 2    nicotine (NICODERM CQ) 21 mg/24 hr TD 24 hr patch, Place 1 patch on the skin every 24 hours Do not smoke cigarettes or use any nicotine or tobacco containing products while wearing the patch as you will get a dangerously high dose of nicotine. (Patient not taking: Reported on 2/7/2025), Disp: 28 patch, Rfl: 0    REVIEW OF SYSTEMS:  Review of Systems   A complete 10 point review of systems was performed and found to be negative unless otherwise noted above or in the HPI.    PHYSICAL EXAM:  Current Weight: Weight - Scale: 90.3 kg (199 lb 1.2 oz)  First Weight: Weight - Scale: 90.3 kg (199 lb 1.2 oz)  Vitals:    02/07/25 0700 02/07/25 0730 02/07/25 0800 02/07/25 1200   BP: 116/75 120/78 114/79 139/71   BP Location:  Left arm Left arm Left arm   Pulse: 75 78 78 81   Resp: 18 20 22 20   Temp:  98 °F (36.7 °C) 97.5 °F (36.4 °C)    TempSrc:  Temporal Temporal    SpO2: 93%  93% 91% 92%   Weight:           Intake/Output Summary (Last 24 hours) at 2/7/2025 1410  Last data filed at 2/7/2025 0906  Gross per 24 hour   Intake 750 ml   Output --   Net 750 ml     Physical Exam  Vitals and nursing note reviewed.   Constitutional:       General: He is not in acute distress.     Appearance: He is well-developed.   HENT:      Head: Normocephalic and atraumatic.   Eyes:      Conjunctiva/sclera: Conjunctivae normal.   Cardiovascular:      Rate and Rhythm: Normal rate and regular rhythm.      Heart sounds: No murmur heard.  Pulmonary:      Effort: Pulmonary effort is normal. No respiratory distress.      Breath sounds: Normal breath sounds.   Abdominal:      Palpations: Abdomen is soft.      Tenderness: There is no abdominal tenderness.   Musculoskeletal:         General: No swelling.      Cervical back: Neck supple.   Skin:     General: Skin is warm and dry.      Capillary Refill: Capillary refill takes less than 2 seconds.   Neurological:      Mental Status: He is alert.   Psychiatric:         Mood and Affect: Mood normal.          Invasive Devices:      Lab Results:   Results from last 7 days   Lab Units 02/07/25  1131 02/07/25  0813 02/07/25  0725 02/07/25  0547 02/07/25  0453   WBC Thousand/uL  --   --   --   --  9.64   HEMOGLOBIN g/dL  --   --   --   --  13.0   HEMATOCRIT %  --   --   --   --  38.1   PLATELETS Thousands/uL  --   --  105*  --  141*   POTASSIUM mmol/L 4.3 3.5  --   --  3.5   CHLORIDE mmol/L 100 99  --   --  98   CO2 mmol/L 22 22  --   --  20*   BUN mg/dL 20 19  --   --  18   CREATININE mg/dL 3.22* 3.05*  --   --  3.07*   CALCIUM mg/dL 8.3* 8.4  --   --  9.2   MAGNESIUM mg/dL  --   --   --  1.8*  --    PHOSPHORUS mg/dL  --   --   --  2.7  --    ALK PHOS U/L  --   --   --   --  177*   ALT U/L  --   --   --   --  313*   AST U/L  --   --   --   --  866*       I have personally reviewed the blood work as stated above and in my note.  I have personally reviewed CT scan imaging  studies.  I have personally reviewed internal medicine note.

## 2025-02-07 NOTE — ASSESSMENT & PLAN NOTE
Patient with hyperbilirbuinemia and elevated LFT in the setting of rhabdomyolysis and ETOH use    Check RUQ US with dopplers  Check acute and chronic hep panel  Avoid Hepatotoxins (lipitor / Tylenol)  GI consult

## 2025-02-07 NOTE — ED PROVIDER NOTES
Time reflects when diagnosis was documented in both MDM as applicable and the Disposition within this note       Time User Action Codes Description Comment    2/7/2025  5:54 AM Chinedu Whitfield Add [M62.82] Rhabdomyolysis     2/7/2025  5:54 AM Chinedu Whitfield Add [N17.9] GUNJAN (acute kidney injury) (HCC)     2/7/2025  5:54 AM Chinedu Whitfield Add [E80.6] Hyperbilirubinemia     2/7/2025  5:54 AM Chinedu Whitfield Add [R53.1] Weakness     2/7/2025  5:54 AM Chinedu Whitfield Add [F10.10] Alcohol abuse     2/7/2025  5:54 AM Chinedu Whitfield Add [R79.89] Elevated LFTs     2/7/2025  6:53 AM Chinedu Whitfield Add [K57.92] Acute diverticulitis     2/7/2025  7:17 AM Chinedu Whitfield Add [E87.1] Hyponatremia           ED Disposition       ED Disposition   Admit    Condition   Stable    Date/Time   Fri Feb 7, 2025  7:04 AM    Comment   Case was discussed with Dr. Woodruff and the patient's admission status was agreed to be Admission Status: inpatient status to the service of Dr. Woodruff.               Assessment & Plan       Medical Decision Making  Medical complexity: 49-year-old male presenting with significant weakness and burning pain in his thighs.  Unable to stand from a seated position secondary to weakness and pain.  Also is having pain in his lower lumbar spine.  Patient is denying other symptoms of SCA/epidural hematoma/cauda equina syndrome.  Given the significant weakness that he is demonstrating and the fact that he was ambulatory just 2 weeks ago, will initiate broad workup to include ESR/CRP for further risk stratification of the possibility of above-mentioned diagnoses, in addition to imaging of the lower abdomen/spine with a CT scan of the abdomen and pelvis.  Will evaluate also hip flexor muscles with this modality.  Will analyze metabolites for signs of significant electrolyte disturbance.  Will evaluate CBC for signs of leukocytosis, left shift, or anemia which can also cause generalized weakness and ambulatory  dysfunctions.  Patient states he is unable to care for his ADLs at home given his significant weakness, will require admission regardless of workup if that continues to be the case.  Will watch patient for signs of alcohol withdrawal while here in the emergency department, will administer thiamine and folate which could also be contributing to his symptoms.    The patient has no outward signs of trauma on examination, though he does indicate that he fell multiple times this week while trying to get up and walk.  Will include imaging of the chest, abdomen, pelvis, and CT of the head to evaluate further for possible traumatic injuries in spite of the lack of physical exam findings given the patient's questionable ability to give history currently.    Reassessment/disposition: On workup, patient found to have severe rhabdomyolysis.  This is causing GUNJAN, LFT elevations, and is likely the burning pain that he is feeling in his thighs.  He was treated with IV fluids in the emergency department but will need ongoing close monitoring of his electrolytes, and ongoing fluid hydration.  Patient was also found to have diverticulitis again on CT scan, there is also questionable fistula formation in the lower abdomen from his ongoing diverticulitis with the bladder.  This was passed on to the day team after Unasyn was ordered here in the ED.  Patient was made aware of his diagnoses and is understanding of the treatment plan at this time.  He was admitted to internal medicine service for ongoing treatments.  At time of admission, he maintained normal range vital signs and stable examination.  He was admitted while in no acute distress but with severe lab abnormalities.    Amount and/or Complexity of Data Reviewed  Labs: ordered. Decision-making details documented in ED Course.  Radiology: ordered.    Risk  Prescription drug management.  Decision regarding hospitalization.        ED Course as of 02/07/25 0717 Fri Feb 07, 2025    0623 Total CK(!): 19,541   0623 Procalcitonin(!): 3.09       Medications   multi-electrolyte (ISOLYTE-S PH 7.4) bolus 1,000 mL (1,000 mL Intravenous New Bag 2/7/25 0708)   ampicillin-sulbactam (UNASYN) 3 g in sodium chloride 0.9 % 100 mL IVPB (has no administration in time range)   multi-electrolyte (PLASMALYTE-A/ISOLYTE-S PH 7.4) IV solution (has no administration in time range)   sodium chloride 0.9 % infusion (has no administration in time range)   folic acid 1 mg, thiamine (VITAMIN B1) 100 mg in sodium chloride 0.9 % 100 mL IV piggyback (has no administration in time range)   clopidogrel (PLAVIX) tablet 75 mg (has no administration in time range)   folic acid (FOLVITE) tablet 1 mg (has no administration in time range)   metoprolol succinate (TOPROL-XL) 24 hr tablet 25 mg (has no administration in time range)   pantoprazole (PROTONIX) EC tablet 40 mg (has no administration in time range)   thiamine tablet 100 mg (has no administration in time range)   nicotine (NICODERM CQ) 21 mg/24 hr TD 24 hr patch 1 patch (has no administration in time range)   heparin (porcine) subcutaneous injection 5,000 Units (has no administration in time range)   cefTRIAXone (ROCEPHIN) IVPB (premix in dextrose) 1,000 mg 50 mL (has no administration in time range)   metroNIDAZOLE (FLAGYL) tablet 500 mg (has no administration in time range)   multi-electrolyte (ISOLYTE-S PH 7.4) bolus 1,000 mL (1,000 mL Intravenous New Bag 2/7/25 0552)       ED Risk Strat Scores                          SBIRT 20yo+      Flowsheet Row Most Recent Value   Initial Alcohol Screen: US AUDIT-C     1. How often do you have a drink containing alcohol? 0 Filed at: 02/07/2025 0426   2. How many drinks containing alcohol do you have on a typical day you are drinking?  0 Filed at: 02/07/2025 0426   3a. Male UNDER 65: How often do you have five or more drinks on one occasion? 0 Filed at: 02/07/2025 0426   3b. FEMALE Any Age, or MALE 65+: How often do you have 4 or  more drinks on one occassion? 0 Filed at: 02/07/2025 0426   Audit-C Score 0 Filed at: 02/07/2025 0426   SEBAS: How many times in the past year have you...    Used an illegal drug or used a prescription medication for non-medical reasons? Never Filed at: 02/07/2025 0426                            History of Present Illness       Chief Complaint   Patient presents with    Medical Problem     Came in by EMS with poor ambulation. States x1 week unable to ambulate at home. Using walker to get around and having falls. States legs feel heavy mostly in thigh. Seen the [facility] x 3weeks ago for dehydration nausea and vomiting       Past Medical History:   Diagnosis Date    GERD (gastroesophageal reflux disease)     TIA (transient ischemic attack)       History reviewed. No pertinent surgical history.   Family History   Problem Relation Age of Onset    No Known Problems Mother     No Known Problems Father       Social History     Tobacco Use    Smoking status: Every Day     Current packs/day: 2.00     Average packs/day: 2.0 packs/day for 25.0 years (50.0 ttl pk-yrs)     Types: Cigarettes    Smokeless tobacco: Never   Substance Use Topics    Alcohol use: Yes     Alcohol/week: 42.0 standard drinks of alcohol     Types: 42 Cans of beer per week     Comment: 6 pack a day    Drug use: No      E-Cigarette/Vaping      E-Cigarette/Vaping Substances      I have reviewed and agree with the history as documented.     This is a 49-year-old male who has a known history of prior CVA with chronic left-sided weakness, in addition to EtOH abuse, tobacco abuse, and GERD.  He presents today due to worsening ambulatory dysfunction over the past 2 weeks.  He also is complaining of lower back pain that has been worsening over the past 2 weeks.  Patient localizes the pain in his back to his lower lumbar spine near the L5-S1 area.  He also notes that he is having severe thigh weakness and pain.  He reports that he has been unable to stand from  a seated position for over a week now.  He is denying any numbness, saddle anesthesias, paresthesias, urinary incontinence, urinary retention, or changes in his stool quantity/quality.  Patient is denying any fevers, chills, night sweats, and states he does not use IVDU and does not believe he is immunosuppressed in any way.  He does not recall any specific trauma to his lower back.  Patient reports that 2 weeks ago he was able to walk unassisted, he currently is having significant difficulty ambulating even with a walker, and is unable to get himself up from a seated position without assistance.        Review of Systems   Constitutional:  Negative for chills, fatigue and fever.   HENT:  Negative for congestion and sore throat.    Eyes:  Negative for pain and visual disturbance.   Respiratory:  Negative for cough, chest tightness and shortness of breath.    Cardiovascular:  Negative for chest pain and palpitations.   Gastrointestinal:  Negative for abdominal pain, blood in stool, constipation, diarrhea, nausea and vomiting.   Genitourinary:  Negative for dysuria, flank pain and hematuria.   Musculoskeletal:  Positive for arthralgias, gait problem and myalgias. Negative for back pain and neck pain.   Skin:  Positive for color change. Negative for rash.   Neurological:  Positive for weakness. Negative for dizziness, syncope and light-headedness.   Hematological:  Negative for adenopathy. Does not bruise/bleed easily.   All other systems reviewed and are negative.          Objective       ED Triage Vitals   Temperature Pulse Blood Pressure Respirations SpO2 Patient Position - Orthostatic VS   02/07/25 0425 02/07/25 0425 02/07/25 0425 02/07/25 0425 02/07/25 0425 --   98.1 °F (36.7 °C) 83 122/80 19 95 %       Temp Source Heart Rate Source BP Location FiO2 (%) Pain Score    02/07/25 0500 -- -- -- 02/07/25 0425    Temporal    No Pain      Vitals      Date and Time Temp Pulse SpO2 Resp BP Pain Score FACES Pain Rating User    02/07/25 0700 -- 75 93 % 18 116/75 No Pain -- SH   02/07/25 0600 -- 73 93 % 20 115/83 No Pain -- SH   02/07/25 0500 97.5 °F (36.4 °C) 79 96 % 18 117/81 No Pain -- SH   02/07/25 0425 -- 83 95 % 19 122/80 -- -- CP   02/07/25 0425 98.1 °F (36.7 °C) -- -- -- -- No Pain -- SH            Physical Exam  Vitals and nursing note reviewed.   Constitutional:       General: He is not in acute distress.     Appearance: He is well-developed and normal weight. He is not toxic-appearing or diaphoretic.   HENT:      Head: Normocephalic and atraumatic.      Right Ear: External ear normal.      Left Ear: External ear normal.      Nose: Nose normal. No congestion or rhinorrhea.      Mouth/Throat:      Mouth: Mucous membranes are moist.      Pharynx: No oropharyngeal exudate or posterior oropharyngeal erythema.   Eyes:      General: Scleral icterus present.      Extraocular Movements: Extraocular movements intact.      Conjunctiva/sclera: Conjunctivae normal.      Pupils: Pupils are equal, round, and reactive to light.   Cardiovascular:      Rate and Rhythm: Normal rate and regular rhythm.      Pulses: Normal pulses.      Heart sounds: No murmur heard.  Pulmonary:      Effort: Pulmonary effort is normal. No respiratory distress.      Breath sounds: Normal breath sounds. No wheezing or rales.   Abdominal:      Palpations: Abdomen is soft. There is no mass.      Tenderness: There is no abdominal tenderness. There is no right CVA tenderness, left CVA tenderness or guarding.      Hernia: No hernia is present.      Comments: Protuberant but soft   Musculoskeletal:         General: No swelling. Normal range of motion.      Cervical back: Normal range of motion and neck supple.      Right lower leg: No edema.      Left lower leg: No edema.   Skin:     General: Skin is warm and dry.      Capillary Refill: Capillary refill takes 2 to 3 seconds.      Coloration: Skin is pale. Skin is not jaundiced.   Neurological:      General: No focal deficit  present.      Mental Status: He is alert and oriented to person, place, and time.      GCS: GCS eye subscore is 4. GCS verbal subscore is 5. GCS motor subscore is 6.      Cranial Nerves: Cranial nerves 2-12 are intact.      Sensory: Sensation is intact. No sensory deficit.      Motor: Weakness (4-/5 strength in bilateral hip flexors) present.      Coordination: Coordination normal. Finger-Nose-Finger Test normal.   Psychiatric:         Mood and Affect: Mood normal.         Behavior: Behavior normal.         Results Reviewed       Procedure Component Value Units Date/Time    Sodium, urine, random [[id number]]     Lab Status: No result Specimen: Urine     Creatinine, urine, random [[id number]]     Lab Status: No result Specimen: Urine     Platelet count [[id number]]     Lab Status: No result Specimen: Blood     Chronic Hepatitis Panel [[id number]]     Lab Status: No result Specimen: Blood     TSH, 3rd generation with Free T4 reflex [[id number]]  (Normal) Collected: 02/07/25 0453    Lab Status: Final result Specimen: Blood from Arm, Right Updated: 02/07/25 0640     TSH 3RD GENERATON 1.190 uIU/mL     Ethanol [[id number]]  (Normal) Collected: 02/07/25 0547    Lab Status: Final result Specimen: Blood from Arm, Right Updated: 02/07/25 0639     Ethanol Lvl <10 mg/dL     Magnesium [[id number]]  (Abnormal) Collected: 02/07/25 0547    Lab Status: Final result Specimen: Blood from Arm, Right Updated: 02/07/25 0639     Magnesium 1.8 mg/dL     Phosphorus [[id number]]  (Normal) Collected: 02/07/25 0547    Lab Status: Final result Specimen: Blood from Arm, Right Updated: 02/07/25 0639     Phosphorus 2.7 mg/dL     Ammonia [[id number]]  (Abnormal) Collected: 02/07/25 0547    Lab Status: Final result Specimen: Blood from Arm, Right Updated: 02/07/25 0638     Ammonia 76 umol/L     Salicylate level [[id number]]  (Normal) Collected: 02/07/25 0547    Lab Status: Final result Specimen: Blood from Arm, Right Updated: 02/07/25 0637     Salicylate Lvl  <5 mg/dL     Acetaminophen level-If concentration is detectable, please discuss with medical  on call. [[id number]]  (Abnormal) Collected: 02/07/25 0547    Lab Status: Final result Specimen: Blood from Arm, Right Updated: 02/07/25 0637     Acetaminophen Level <2 ug/mL     FLU/COVID Rapid Antigen (30 min. TAT) - Preferred screening test in ED [[id number]]  (Normal) Collected: 02/07/25 0542    Lab Status: Final result Specimen: Nares from Nose Updated: 02/07/25 0613     SARS COV Rapid Antigen Negative     Influenza A Rapid Antigen Negative     Influenza B Rapid Antigen Negative    Narrative:      This test has been performed using the [employer] Alysia 2 FLU+SARS Antigen test under the Emergency Use Authorization (EUA). This test has been validated by the  and verified by the performing laboratory. The Alysia uses lateral flow immunofluorescent sandwich assay to detect SARS-COV, Influenza A and Influenza B Antigen.     The [employer] Alysia 2 SARS Antigen test does not differentiate between SARS-CoV and SARS-CoV-2.     Negative results are presumptive and may be confirmed with a molecular assay, if necessary, for patient management. Negative results do not rule out SARS-CoV-2 or influenza infection and should not be used as the sole basis for treatment or patient management decisions. A negative test result may occur if the level of antigen in a sample is below the limit of detection of this test.     Positive results are indicative of the presence of viral antigens, but do not rule out bacterial infection or co-infection with other viruses.     All test results should be used as an adjunct to clinical observations and other information available to the provider.    FOR PEDIATRIC PATIENTS - copy/paste COVID Guidelines URL to browser: [email or web address]    Hepatitis panel, acute [[id number]] Collected: 02/07/25 0547    Lab Status: In process Specimen:  Blood from Arm, Right Updated: 02/07/25 0554    CK [[id number]]  (Abnormal) Collected: 02/07/25 0453    Lab Status: Final result Specimen: Blood from Arm, Right Updated: 02/07/25 0543     Total CK 19,541 U/L     Procalcitonin [[id number]]  (Abnormal) Collected: 02/07/25 0453    Lab Status: Final result Specimen: Blood from Arm, Right Updated: 02/07/25 0537     Procalcitonin 3.09 ng/ml     Comprehensive metabolic panel [[id number]]  (Abnormal) Collected: 02/07/25 0453    Lab Status: Final result Specimen: Blood from Arm, Right Updated: 02/07/25 0536     Sodium 127 mmol/L      Potassium 3.5 mmol/L      Chloride 98 mmol/L      CO2 20 mmol/L      ANION GAP 9 mmol/L      BUN 18 mg/dL      Creatinine 3.07 mg/dL      Glucose 95 mg/dL      Calcium 9.2 mg/dL      Corrected Calcium 10.6 mg/dL       U/L       U/L      Alkaline Phosphatase 177 U/L      Total Protein 5.9 g/dL      Albumin 2.3 g/dL      Total Bilirubin 5.69 mg/dL      eGFR 22 ml/min/1.73sq m     Narrative:      National Kidney Disease Foundation guidelines for Chronic Kidney Disease (CKD):     Stage 1 with normal or high GFR (GFR > 90 mL/min/1.73 square meters)    Stage 2 Mild CKD (GFR = 60-89 mL/min/1.73 square meters)    Stage 3A Moderate CKD (GFR = 45-59 mL/min/1.73 square meters)    Stage 3B Moderate CKD (GFR = 30-44 mL/min/1.73 square meters)    Stage 4 Severe CKD (GFR = 15-29 mL/min/1.73 square meters)    Stage 5 End Stage CKD (GFR <15 mL/min/1.73 square meters)  Note: GFR calculation is accurate only with a steady state creatinine    Lipase [[id number]]  (Normal) Collected: 02/07/25 0453    Lab Status: Final result Specimen: Blood from Arm, Right Updated: 02/07/25 0535     Lipase 55 u/L     C-reactive protein [[id number]]  (Abnormal) Collected: 02/07/25 0453    Lab Status: Final result Specimen: Blood from Arm, Right Updated: 02/07/25 0535     CRP 43.7 mg/L     Urine Microscopic [[id number]] Collected: 02/07/25 0515    Lab Status: Final result  Specimen: Urine, Other Updated: 02/07/25 0534     RBC, UA 0-1 /hpf      WBC, UA 0-1 /hpf      Epithelial Cells Occasional /hpf      Bacteria, UA Occasional /hpf      Amorphous Crystals, UA Moderate    Sedimentation rate, automated [[id number]]  (Abnormal) Collected: 02/07/25 0453    Lab Status: Final result Specimen: Blood from Arm, Right Updated: 02/07/25 0527     Sed Rate 98 mm/hour     UA w Reflex to Microscopic w Reflex to Culture [[id number]]  (Abnormal) Collected: 02/07/25 0515    Lab Status: Final result Specimen: Urine, Other Updated: 02/07/25 0525     Color, UA Yellow     Clarity, UA Slightly Cloudy     Specific Gravity, UA 1.010     pH, UA 6.0     Leukocytes, UA Negative     Nitrite, UA Negative     Protein,  (2+) mg/dl      Glucose, UA Negative mg/dl      Ketones, UA Negative mg/dl      Urobilinogen, UA <2.0 mg/dl      Bilirubin, UA Negative     Occult Blood, UA Large    Lactic acid, plasma (w/reflex if result > 2.0) [[id number]]  (Normal) Collected: 02/07/25 0453    Lab Status: Final result Specimen: Blood from Arm, Right Updated: 02/07/25 0517     LACTIC ACID 1.3 mmol/L     Narrative:      Result may be elevated if tourniquet was used during collection.    CBC and differential [[id number]]  (Abnormal) Collected: 02/07/25 0453    Lab Status: Final result Specimen: Blood from Arm, Right Updated: 02/07/25 0510     WBC 9.64 Thousand/uL      RBC 3.76 Million/uL      Hemoglobin 13.0 g/dL      Hematocrit 38.1 %       fL      MCH 34.6 pg      MCHC 34.1 g/dL      RDW 15.3 %      MPV 11.6 fL      Platelets 141 Thousands/uL      nRBC 0 /100 WBCs      Segmented % 78 %      Immature Grans % 0 %      Lymphocytes % 12 %      Monocytes % 8 %      Eosinophils Relative 1 %      Basophils Relative 1 %      Absolute Neutrophils 7.46 Thousands/µL      Absolute Immature Grans 0.04 Thousand/uL      Absolute Lymphocytes 1.15 Thousands/µL      Absolute Monocytes 0.81 Thousand/µL      Eosinophils Absolute 0.11  Thousand/µL      Basophils Absolute 0.07 Thousands/µL             CT head wo contrast   Final Interpretation by Timo Walters MD (02/07 0634)      No acute intracranial abnormality.      Mildly increased ventricular prominence since the prior studies raising the question of normal pressure hydrocephalus.            Workstation performed: [id number]         CT abdomen pelvis wo contrast   Final Interpretation by Timo Walters MD (02/07 0649)      Acute sigmoid diverticulitis. Inflamed sigmoid abuts the dome of the urinary bladder, with associated bladder wall thickening raising the question of developing fistula.      Hepatosplenomegaly with hepatic steatosis.      Indeterminate 1.6 cm left renal cyst. Recommend follow-up ultrasound.      Nonobstructing right renal calculus.      The study was marked in EPIC for immediate notification.      Workstation performed: [id number]         XR chest 1 view portable    (Results Pending)   US right upper quadrant with liver dopplers    (Results Pending)   US kidney and bladder with pvr    (Results Pending)       Procedures    ED Medication and Procedure Management   Prior to Admission Medications   Prescriptions Last Dose Informant Patient Reported? Taking?   atorvastatin (LIPITOR) 80 mg tablet   No No   Sig: Take 1 tablet (80 mg total) by mouth every evening   clopidogrel (PLAVIX) 75 mg tablet   No No   Sig: take 1 tablet by mouth once daily   dextromethorphan-quinidine (NUEDEXTA) 20-10 mg   No No   Sig: Take 1 tab daily x 1 week, then 1 tab BID   folic acid (FOLVITE) 1 mg tablet   No No   Sig: Take 1 tablet (1 mg total) by mouth daily   metoprolol succinate (TOPROL-XL) 25 mg 24 hr tablet   No No   Sig: take 1 tablet by mouth once daily   nicotine (NICODERM CQ) 21 mg/24 hr TD 24 hr patch   No No   Sig: Place 1 patch on the skin every 24 hours Do not smoke cigarettes or use any nicotine or tobacco containing products while wearing the patch as you will get a  dangerously high dose of nicotine.   Patient not taking: Reported on 3/7/2019   omeprazole (PriLOSEC) 20 mg delayed release capsule  Self Yes No   Sig: Take 20 mg by mouth daily   thiamine 100 MG tablet   No No   Sig: Take 1 tablet (100 mg total) by mouth daily      Facility-Administered Medications: None     Patient's Medications   Discharge Prescriptions    No medications on file     No discharge procedures on file.  ED SEPSIS DOCUMENTATION   Time reflects when diagnosis was documented in both MDM as applicable and the Disposition within this note       Time User Action Codes Description Comment    2/7/2025  5:54 AM Chinedu Whitfield [M62.82] Rhabdomyolysis     2/7/2025  5:54 AM Chinedu Whitfield [N17.9] GUNJAN (acute kidney injury) (HCC)     2/7/2025  5:54 AM Chinedu Whitfield [E80.6] Hyperbilirubinemia     2/7/2025  5:54 AM Chinedu Whitfield [R53.1] Weakness     2/7/2025  5:54 AM Chinedu Whitfield [F10.10] Alcohol abuse     2/7/2025  5:54 AM Chinedu Whitfield [R79.89] Elevated LFTs     2/7/2025  6:53 AM Chinedu Whitfield [K57.92] Acute diverticulitis     2/7/2025  7:17 AM Chinedu Whitfield [E87.1] Hyponatremia                  Chinedu Whitfield MD  02/07/25 0717

## 2025-02-07 NOTE — H&P
H&P - Hospitalist   Name: Emiliano Rodriguez 49 y.o. male I MRN: [id number]  Unit/Bed#: RM06 I Date of Admission: 2/7/2025   Date of Service: 2/7/2025 I Hospital Day: 0     Assessment & Plan  Acute renal failure (ARF) (HCC)  Likely due to prerenal azotemia and rhabdomyolysis, case was reviewed with nephrology due to hyponatremia and concern for overcorrection    Patient received 1.5 L fluid bolus, placed at 150 cc/hour, repeat sodium 130, decreased IV flow rate to 100cc/hr   Check urine electrolytes  Check renal US with PVR   Initiate Urinary retention protocol   Nephro consult     Acute diverticulitis  Ceftriaxone / Flagy  Check C.diff , Enteric panel, Giardia, calprotectic   GI on board   Hyponatremia  Na 127 on presentation, increased to 130  Check Urine Na   Repeat BMP at 1130  Nephro consult  Rhabdomyolysis  Patient states he's been in the chair for 2 days , states my legs dont work  CK 19K, received 1.5 L NS bolus, now on 100cc/hr   Continue to trend CK and consult nephro   Abnormal LFTs  Patient with hyperbilirbuinemia and elevated LFT in the setting of rhabdomyolysis and ETOH use    Check RUQ US with dopplers  Check acute and chronic hep panel  Avoid Hepatotoxins (lipitor / Tylenol)  GI consult   Gastroesophageal reflux disease without esophagitis  Protonix 40 mg daily  GI consult  Outpatient EGD  Alcohol abuse  Endorses at least 5 beers a day, although I suspect this is understated  Folic acid, thiamine  Initiate CIWA protocol      VTE Pharmacologic Prophylaxis:   Moderate Risk (Score 3-4) - Pharmacological DVT Prophylaxis Ordered: heparin.  Code Status: Level 1 - Full Code  Yes  Discussion with family:     Anticipated Length of Stay: Patient will be admitted on an inpatient basis with an anticipated length of stay of greater than 2 midnights secondary to ARF.    History of Present Illness   Chief Complaint: my legs down work     Emiliano Rodriguez is a 49 y.o. male with a PMH of alcohol use and esophageal  reflux who presents with weakness to the bilateral lower extremities.  The patient states he has been in his recliner for the past 2 to 3 days.  He states he is unemployed and drinks about 5 beers a day, however I feel this is understated.  He has had no fevers chills nausea or vomiting, but states he has not urinated.  He denies any drug use but smokes 1.5 packs a day.    Review of Systems   Constitutional:  Negative for chills and fever.   Cardiovascular:  Negative for chest pain and leg swelling.   Gastrointestinal:  Negative for nausea and vomiting.   Neurological:  Positive for weakness. Negative for seizures and numbness.   All other systems reviewed and are negative.      Historical Information   Past Medical History:   Diagnosis Date    GERD (gastroesophageal reflux disease)     TIA (transient ischemic attack)      History reviewed. No pertinent surgical history.  Social History     Tobacco Use    Smoking status: Every Day     Current packs/day: 2.00     Average packs/day: 2.0 packs/day for 25.0 years (50.0 ttl pk-yrs)     Types: Cigarettes    Smokeless tobacco: Never   Substance and Sexual Activity    Alcohol use: Yes     Alcohol/week: 42.0 standard drinks of alcohol     Types: 42 Cans of beer per week     Comment: 6 pack a day    Drug use: No    Sexual activity: Not on file     E-Cigarette/Vaping     E-Cigarette/Vaping Substances     Family history non-contributory  Social History:  Marital Status: Legally    Occupation: unemployed  Patient Pre-hospital Living Situation: Home  Patient Pre-hospital Level of Mobility: walks  Patient Pre-hospital Diet Restrictions: no    Meds/Allergies   I have reviewed home medications using recent Epic encounter.  Prior to Admission medications    Medication Sig Start Date End Date Taking? Authorizing Provider   atorvastatin (LIPITOR) 80 mg tablet Take 1 tablet (80 mg total) by mouth every evening 1/3/19   Rebel Howard, DO   clopidogrel (PLAVIX) 75 mg tablet  take 1 tablet by mouth once daily 8/13/20   Kayla Russo PA-C   dextromethorphan-quinidine (NUEDEXTA) 20-10 mg Take 1 tab daily x 1 week, then 1 tab BID 3/7/19   Cathy Zapien,    folic acid (FOLVITE) 1 mg tablet Take 1 tablet (1 mg total) by mouth daily 1/4/19   Rebel Howard, DO   metoprolol succinate (TOPROL-XL) 25 mg 24 hr tablet take 1 tablet by mouth once daily 11/21/19   Kayla Russo PA-C   nicotine (NICODERM CQ) 21 mg/24 hr TD 24 hr patch Place 1 patch on the skin every 24 hours Do not smoke cigarettes or use any nicotine or tobacco containing products while wearing the patch as you will get a dangerously high dose of nicotine.  Patient not taking: Reported on 3/7/2019 1/3/19   Rebel Howard, DO   omeprazole (PriLOSEC) 20 mg delayed release capsule Take 20 mg by mouth daily    Historical Provider, MD   thiamine 100 MG tablet Take 1 tablet (100 mg total) by mouth daily 1/4/19   Rebel Howard, DO     No Known Allergies    Objective :  Temp:  [97.5 °F (36.4 °C)-98.1 °F (36.7 °C)] 97.5 °F (36.4 °C)  HR:  [73-83] 78  BP: (114-122)/(75-83) 114/79  Resp:  [18-22] 22  SpO2:  [91 %-96 %] 91 %  O2 Device: None (Room air)    Physical Exam  Constitutional:       Appearance: Normal appearance.   HENT:      Mouth/Throat:      Mouth: Mucous membranes are dry.   Eyes:      General: Scleral icterus present.   Cardiovascular:      Rate and Rhythm: Normal rate and regular rhythm.      Pulses: Normal pulses.      Heart sounds: No murmur heard.     No gallop.   Pulmonary:      Effort: Pulmonary effort is normal. No respiratory distress.      Breath sounds: No wheezing or rales.   Abdominal:      General: Abdomen is flat. Bowel sounds are normal. There is no distension.      Tenderness: There is no abdominal tenderness. There is no guarding.   Musculoskeletal:      Right lower leg: No edema.      Left lower leg: No edema.   Skin:     Capillary Refill: Capillary refill takes 2 to 3 seconds.    Neurological:      General: No focal deficit present.      Mental Status: He is alert and oriented to person, place, and time.          Lines/Drains:            Lab Results: I have reviewed the following results:  Results from last 7 days   Lab Units 02/07/25  0725 02/07/25  0453   WBC Thousand/uL  --  9.64   HEMOGLOBIN g/dL  --  13.0   HEMATOCRIT %  --  38.1   PLATELETS Thousands/uL 105* 141*   SEGS PCT %  --  78*   LYMPHO PCT %  --  12*   MONO PCT %  --  8   EOS PCT %  --  1     Results from last 7 days   Lab Units 02/07/25  0813 02/07/25  0453   SODIUM mmol/L 130* 127*   POTASSIUM mmol/L 3.5 3.5   CHLORIDE mmol/L 99 98   CO2 mmol/L 22 20*   BUN mg/dL 19 18   CREATININE mg/dL 3.05* 3.07*   ANION GAP mmol/L 9 9   CALCIUM mg/dL 8.4 9.2   ALBUMIN g/dL  --  2.3*   TOTAL BILIRUBIN mg/dL  --  5.69*   ALK PHOS U/L  --  177*   ALT U/L  --  313*   AST U/L  --  866*   GLUCOSE RANDOM mg/dL 86 95             Lab Results   Component Value Date    HGBA1C 4.5 12/26/2024    HGBA1C 4.8 01/01/2019     Results from last 7 days   Lab Units 02/07/25  0453   LACTIC ACID mmol/L 1.3   PROCALCITONIN ng/ml 3.09*       Imaging Results Review: I reviewed radiology reports from this admission including: CT abdomen/pelvis.  Other Study Results Review: EKG was reviewed.     Administrative Statements       ** Please Note: This note has been constructed using a voice recognition system. **

## 2025-02-07 NOTE — ASSESSMENT & PLAN NOTE
Stool studies ordered: Giardia lamblia, EIA, O&P, C. difficile, Said, pancreatic elastase, fecal calprotectin, and ceruloplasmin ordered to rule out any kind of infectious, parasitic, or the possibility of EPI secondary to the longstanding history of loose stools.    Continue Rocephin and Flagyl as ordered.

## 2025-02-08 PROBLEM — E87.6 HYPOKALEMIA: Status: ACTIVE | Noted: 2025-02-08

## 2025-02-08 PROBLEM — E86.9 VOLUME DEPLETION: Status: ACTIVE | Noted: 2025-02-08

## 2025-02-08 PROBLEM — N17.0 ACUTE TUBULAR NECROSIS (HCC): Status: ACTIVE | Noted: 2025-02-08

## 2025-02-08 LAB
ALBUMIN SERPL BCG-MCNC: 2.1 G/DL (ref 3.5–5)
ALP SERPL-CCNC: 139 U/L (ref 34–104)
ALT SERPL W P-5'-P-CCNC: 288 U/L (ref 7–52)
ANION GAP SERPL CALCULATED.3IONS-SCNC: 10 MMOL/L (ref 4–13)
ANION GAP SERPL CALCULATED.3IONS-SCNC: 10 MMOL/L (ref 4–13)
ANION GAP SERPL CALCULATED.3IONS-SCNC: 8 MMOL/L (ref 4–13)
ANION GAP SERPL CALCULATED.3IONS-SCNC: 9 MMOL/L (ref 4–13)
AST SERPL W P-5'-P-CCNC: 913 U/L (ref 13–39)
BASOPHILS # BLD AUTO: 0.03 THOUSANDS/ΜL (ref 0–0.1)
BASOPHILS NFR BLD AUTO: 0 % (ref 0–1)
BILIRUB SERPL-MCNC: 4.21 MG/DL (ref 0.2–1)
BUN SERPL-MCNC: 23 MG/DL (ref 5–25)
BUN SERPL-MCNC: 24 MG/DL (ref 5–25)
BUN SERPL-MCNC: 24 MG/DL (ref 5–25)
BUN SERPL-MCNC: 25 MG/DL (ref 5–25)
C COLI+JEJUNI TUF STL QL NAA+PROBE: NEGATIVE
C DIFF TOX GENS STL QL NAA+PROBE: NEGATIVE
CALCIUM ALBUM COR SERPL-MCNC: 9.6 MG/DL (ref 8.3–10.1)
CALCIUM SERPL-MCNC: 8 MG/DL (ref 8.4–10.2)
CALCIUM SERPL-MCNC: 8.1 MG/DL (ref 8.4–10.2)
CALCIUM SERPL-MCNC: 8.2 MG/DL (ref 8.4–10.2)
CALCIUM SERPL-MCNC: 8.3 MG/DL (ref 8.4–10.2)
CHLORIDE SERPL-SCNC: 103 MMOL/L (ref 96–108)
CHLORIDE SERPL-SCNC: 103 MMOL/L (ref 96–108)
CHLORIDE SERPL-SCNC: 104 MMOL/L (ref 96–108)
CHLORIDE SERPL-SCNC: 104 MMOL/L (ref 96–108)
CK SERPL-CCNC: ABNORMAL U/L (ref 39–308)
CO2 SERPL-SCNC: 18 MMOL/L (ref 21–32)
CO2 SERPL-SCNC: 19 MMOL/L (ref 21–32)
CO2 SERPL-SCNC: 19 MMOL/L (ref 21–32)
CO2 SERPL-SCNC: 20 MMOL/L (ref 21–32)
CREAT SERPL-MCNC: 3.89 MG/DL (ref 0.6–1.3)
CREAT SERPL-MCNC: 4.06 MG/DL (ref 0.6–1.3)
CREAT SERPL-MCNC: 4.06 MG/DL (ref 0.6–1.3)
CREAT SERPL-MCNC: 4.3 MG/DL (ref 0.6–1.3)
CREAT UR-MCNC: 51 MG/DL
EC STX1+STX2 GENES STL QL NAA+PROBE: NEGATIVE
EOSINOPHIL # BLD AUTO: 0.11 THOUSAND/ΜL (ref 0–0.61)
EOSINOPHIL NFR BLD AUTO: 2 % (ref 0–6)
ERYTHROCYTE [DISTWIDTH] IN BLOOD BY AUTOMATED COUNT: 15.3 % (ref 11.6–15.1)
GFR SERPL CREATININE-BSD FRML MDRD: 15 ML/MIN/1.73SQ M
GFR SERPL CREATININE-BSD FRML MDRD: 16 ML/MIN/1.73SQ M
GFR SERPL CREATININE-BSD FRML MDRD: 16 ML/MIN/1.73SQ M
GFR SERPL CREATININE-BSD FRML MDRD: 17 ML/MIN/1.73SQ M
GLUCOSE SERPL-MCNC: 74 MG/DL (ref 65–140)
GLUCOSE SERPL-MCNC: 75 MG/DL (ref 65–140)
GLUCOSE SERPL-MCNC: 87 MG/DL (ref 65–140)
GLUCOSE SERPL-MCNC: 91 MG/DL (ref 65–140)
HAV IGM SER QL: NORMAL
HBV CORE AB SER QL: NORMAL
HBV CORE IGM SER QL: NORMAL
HBV CORE IGM SER QL: NORMAL
HBV SURFACE AG SER QL: NORMAL
HBV SURFACE AG SER QL: NORMAL
HCT VFR BLD AUTO: 32.8 % (ref 36.5–49.3)
HCV AB SER QL: NORMAL
HCV AB SER QL: NORMAL
HGB BLD-MCNC: 10.8 G/DL (ref 12–17)
IGA SERPL-MCNC: 593 MG/DL (ref 66–433)
IGG SERPL-MCNC: 959 MG/DL (ref 635–1741)
IGM SERPL-MCNC: 77 MG/DL (ref 45–281)
IMM GRANULOCYTES # BLD AUTO: 0.02 THOUSAND/UL (ref 0–0.2)
IMM GRANULOCYTES NFR BLD AUTO: 0 % (ref 0–2)
LYMPHOCYTES # BLD AUTO: 0.82 THOUSANDS/ΜL (ref 0.6–4.47)
LYMPHOCYTES NFR BLD AUTO: 12 % (ref 14–44)
MCH RBC QN AUTO: 33.6 PG (ref 26.8–34.3)
MCHC RBC AUTO-ENTMCNC: 32.9 G/DL (ref 31.4–37.4)
MCV RBC AUTO: 102 FL (ref 82–98)
MONOCYTES # BLD AUTO: 0.56 THOUSAND/ΜL (ref 0.17–1.22)
MONOCYTES NFR BLD AUTO: 8 % (ref 4–12)
NEUTROPHILS # BLD AUTO: 5.41 THOUSANDS/ΜL (ref 1.85–7.62)
NEUTS SEG NFR BLD AUTO: 78 % (ref 43–75)
NRBC BLD AUTO-RTO: 0 /100 WBCS
PHOSPHATE SERPL-MCNC: 2.6 MG/DL (ref 2.7–4.5)
PLATELET # BLD AUTO: 108 THOUSANDS/UL (ref 149–390)
PMV BLD AUTO: 12.4 FL (ref 8.9–12.7)
POTASSIUM SERPL-SCNC: 3.2 MMOL/L (ref 3.5–5.3)
POTASSIUM SERPL-SCNC: 3.3 MMOL/L (ref 3.5–5.3)
POTASSIUM SERPL-SCNC: 3.5 MMOL/L (ref 3.5–5.3)
POTASSIUM SERPL-SCNC: 3.6 MMOL/L (ref 3.5–5.3)
PROT SERPL-MCNC: 5.2 G/DL (ref 6.4–8.4)
RBC # BLD AUTO: 3.21 MILLION/UL (ref 3.88–5.62)
SALMONELLA SP SPAO STL QL NAA+PROBE: NEGATIVE
SHIGELLA SP+EIEC IPAH STL QL NAA+PROBE: NEGATIVE
SODIUM SERPL-SCNC: 130 MMOL/L (ref 135–147)
SODIUM SERPL-SCNC: 132 MMOL/L (ref 135–147)
SODIUM SERPL-SCNC: 132 MMOL/L (ref 135–147)
SODIUM SERPL-SCNC: 133 MMOL/L (ref 135–147)
SODIUM UR-SCNC: 29 MMOL/L
WBC # BLD AUTO: 6.95 THOUSAND/UL (ref 4.31–10.16)

## 2025-02-08 PROCEDURE — 86381 MITOCHONDRIAL ANTIBODY EACH: CPT | Performed by: NURSE PRACTITIONER

## 2025-02-08 PROCEDURE — 82550 ASSAY OF CK (CPK): CPT | Performed by: FAMILY MEDICINE

## 2025-02-08 PROCEDURE — 84100 ASSAY OF PHOSPHORUS: CPT | Performed by: FAMILY MEDICINE

## 2025-02-08 PROCEDURE — 99233 SBSQ HOSP IP/OBS HIGH 50: CPT | Performed by: INTERNAL MEDICINE

## 2025-02-08 PROCEDURE — 82784 ASSAY IGA/IGD/IGG/IGM EACH: CPT | Performed by: NURSE PRACTITIONER

## 2025-02-08 PROCEDURE — 80053 COMPREHEN METABOLIC PANEL: CPT | Performed by: FAMILY MEDICINE

## 2025-02-08 PROCEDURE — 82103 ALPHA-1-ANTITRYPSIN TOTAL: CPT | Performed by: NURSE PRACTITIONER

## 2025-02-08 PROCEDURE — 86015 ACTIN ANTIBODY EACH: CPT | Performed by: NURSE PRACTITIONER

## 2025-02-08 PROCEDURE — 84300 ASSAY OF URINE SODIUM: CPT | Performed by: INTERNAL MEDICINE

## 2025-02-08 PROCEDURE — 99232 SBSQ HOSP IP/OBS MODERATE 35: CPT | Performed by: FAMILY MEDICINE

## 2025-02-08 PROCEDURE — 82570 ASSAY OF URINE CREATININE: CPT | Performed by: INTERNAL MEDICINE

## 2025-02-08 PROCEDURE — 82390 ASSAY OF CERULOPLASMIN: CPT | Performed by: NURSE PRACTITIONER

## 2025-02-08 PROCEDURE — 80048 BASIC METABOLIC PNL TOTAL CA: CPT

## 2025-02-08 PROCEDURE — 85025 COMPLETE CBC W/AUTO DIFF WBC: CPT | Performed by: FAMILY MEDICINE

## 2025-02-08 RX ORDER — SACCHAROMYCES BOULARDII 250 MG
250 CAPSULE ORAL 2 TIMES DAILY
Status: DISCONTINUED | OUTPATIENT
Start: 2025-02-08 | End: 2025-02-19 | Stop reason: HOSPADM

## 2025-02-08 RX ORDER — VANCOMYCIN HYDROCHLORIDE 125 MG/1
125 CAPSULE ORAL EVERY 6 HOURS SCHEDULED
Status: DISCONTINUED | OUTPATIENT
Start: 2025-02-08 | End: 2025-02-08

## 2025-02-08 RX ORDER — POTASSIUM CHLORIDE 1500 MG/1
40 TABLET, EXTENDED RELEASE ORAL 2 TIMES DAILY
Status: COMPLETED | OUTPATIENT
Start: 2025-02-08 | End: 2025-02-08

## 2025-02-08 RX ORDER — SODIUM CHLORIDE 9 MG/ML
100 INJECTION, SOLUTION INTRAVENOUS CONTINUOUS
Status: DISCONTINUED | OUTPATIENT
Start: 2025-02-08 | End: 2025-02-14

## 2025-02-08 RX ADMIN — THIAMINE HCL TAB 100 MG 100 MG: 100 TAB at 08:54

## 2025-02-08 RX ADMIN — Medication 250 MG: at 19:00

## 2025-02-08 RX ADMIN — METRONIDAZOLE 500 MG: 500 TABLET ORAL at 21:20

## 2025-02-08 RX ADMIN — VANCOMYCIN HYDROCHLORIDE 125 MG: 125 CAPSULE ORAL at 10:20

## 2025-02-08 RX ADMIN — POTASSIUM CHLORIDE 40 MEQ: 1500 TABLET, EXTENDED RELEASE ORAL at 19:00

## 2025-02-08 RX ADMIN — Medication 250 MG: at 12:16

## 2025-02-08 RX ADMIN — CEFTRIAXONE 1000 MG: 1 INJECTION, SOLUTION INTRAVENOUS at 08:54

## 2025-02-08 RX ADMIN — SODIUM CHLORIDE 100 ML/HR: 0.9 INJECTION, SOLUTION INTRAVENOUS at 11:51

## 2025-02-08 RX ADMIN — METRONIDAZOLE 500 MG: 500 TABLET ORAL at 05:06

## 2025-02-08 RX ADMIN — FOLIC ACID 1 MG: 1 TABLET ORAL at 08:54

## 2025-02-08 RX ADMIN — METRONIDAZOLE 500 MG: 500 TABLET ORAL at 15:07

## 2025-02-08 RX ADMIN — SODIUM CHLORIDE 100 ML/HR: 0.9 INJECTION, SOLUTION INTRAVENOUS at 02:23

## 2025-02-08 RX ADMIN — PANTOPRAZOLE SODIUM 40 MG: 40 TABLET, DELAYED RELEASE ORAL at 05:06

## 2025-02-08 RX ADMIN — CLOPIDOGREL 75 MG: 75 TABLET ORAL at 08:54

## 2025-02-08 RX ADMIN — SODIUM CHLORIDE 150 ML/HR: 0.9 INJECTION, SOLUTION INTRAVENOUS at 19:02

## 2025-02-08 RX ADMIN — POTASSIUM CHLORIDE 40 MEQ: 1500 TABLET, EXTENDED RELEASE ORAL at 12:16

## 2025-02-08 RX ADMIN — METOPROLOL SUCCINATE 25 MG: 25 TABLET, EXTENDED RELEASE ORAL at 08:54

## 2025-02-08 NOTE — PROGRESS NOTES
Progress Note - Nephrology   Name: Emiliano Rodriguez 49 y.o. male I MRN: [id number]  Unit/Bed#: 404-01 I Date of Admission: 2/7/2025   Date of Service: 2/8/2025 I Hospital Day: 1     Assessment & Plan  Acute renal failure (ARF) (HCC)  Creatinine slightly increased over the last 24 hours, previously, from yesterday at the patient's fractional secretion of sodium was noted to be greater than 2% indicating potential underlying acute tubular necrosis, will reassess those labs today.  Upon visual inspection of his urine, it was dark and of low volume, which is more indicative of a prerenal state.  Will increase patient's IV fluids from 100 mL/hr up to 150 mL/hour.  Once the patient appears to be adequately volume resuscitated we can reduce back down.  In the meantime I believe GI losses as the likely source of his volume depletion.  Acute tubular necrosis (HCC)  As noted above this is the most likely etiology of the patient's acute kidney injury, will need to have supportive care and treatment going forward.  Although the patient's creatinine continues to increase, I believe this may be in part due to true volume depleted state due to GI losses.  Increase IV fluids for now, look to discontinue once the patient's clinical volume status appears to be appropriate.  Hyponatremia  Continue with isotonic saline currently with normal saline.  May continue with fluid restriction at this time.  Hypokalemia  Patient getting oral potassium supplementation, level from this morning has improved from 3.2 up to 3.6 mmol/L, continue to monitor for now.  Volume depletion  Continue with isotonic saline for supplementation, optimize care avoid nephrotoxins.  Diarrhea  As noted above this is most likely etiology of the patient's volume losses,  the patient's C. difficile toxin was negative, continue supportive care at this time.  Rhabdomyolysis  Improving, continue with IV fluids as noted above.  Gastroesophageal reflux disease without  "esophagitis  Continue with proton pump inhibitor, gastroenterology evaluated the patient, will need to have an endoscopy as an outpatient.  Alcohol abuse  Continue with care according to hospitalist colleagues, patient is on the CIWA protocol.  Abnormal LFTs  Potentially related to alcohol intake, will be following with gastroenterology.  Acute diverticulitis  Continue ceftriaxone and metronidazole, today is day 2 of care and therapy.    Case discussed with hospitalist.  Increase IV fluids, reassess urine sodium/creatinine, patient I presentation appear to have been in acute tubular necrosis, but it also appears that he requires additional volume at this time.    Follow up reason for today's visit: Acute kidney injury/acute tubular necrosis/electrolyte disorders    <principal problem not specified>    Patient Active Problem List   Diagnosis    CVA (cerebral vascular accident) (HCC)    Gastroesophageal reflux disease without esophagitis    TIA (transient ischemic attack)    Alcohol abuse    Tobacco abuse    Acute renal failure (ARF) (HCC)    Hyponatremia    Rhabdomyolysis    Abnormal LFTs    Acute diverticulitis    Diarrhea    Steatosis of liver    Hepatomegaly         Subjective:   No acute issues, patient continues to experience significant amount of loose bowel movements.    Objective:     Vitals: Blood pressure 117/87, pulse 83, temperature 97.5 °F (36.4 °C), resp. rate 20, height 5' 6\" (1.676 m), weight 90.9 kg (200 lb 6.4 oz), SpO2 95%.,Body mass index is 32.35 kg/m².    Weight (last 2 days)       Date/Time Weight    02/07/25 14:33:52 90.9 (200.4)    02/07/25 0425 90.3 (199.08)              Intake/Output Summary (Last 24 hours) at 2/8/2025 1224  Last data filed at 2/8/2025 0900  Gross per 24 hour   Intake 240 ml   Output 500 ml   Net -260 ml     I/O last 3 completed shifts:  In: 750 [I.V.:500; IV Piggyback:250]  Out: 250 [Urine:250]         Physical Exam: /87   Pulse 83   Temp 97.5 °F (36.4 °C)   Resp " "20   Ht 5' 6\" (1.676 m)   Wt 90.9 kg (200 lb 6.4 oz)   SpO2 95%   BMI 32.35 kg/m²     General Appearance:    Alert, cooperative, no distress, appears stated age   Head:    Normocephalic, without obvious abnormality, atraumatic   Eyes:    Conjunctiva/corneas clear   Ears:    Normal external ears   Nose:   Nares normal, septum midline, mucosa normal, no drainage    or sinus tenderness   Throat:   Lips, mucosa, and tongue normal; teeth and gums normal   Neck:   Supple   Back:     Symmetric, no curvature, ROM normal, no CVA tenderness   Lungs:     Clear to auscultation bilaterally, respirations unlabored   Chest wall:    No tenderness or deformity   Heart:    Regular rate and rhythm, S1 and S2 normal, no murmur, rub   or gallop   Abdomen:     Soft, non-tender, bowel sounds active   Extremities:   Extremities normal, atraumatic, no cyanosis or edema   Skin:   Skin color, texture, turgor normal, no rashes or lesions   Lymph nodes:   Cervical normal   Neurologic:   CNII-XII intact            Lab, Imaging and other studies: I have personally reviewed pertinent labs.  CBC:   Lab Results   Component Value Date    WBC 6.95 02/08/2025    HGB 10.8 (L) 02/08/2025    HCT 32.8 (L) 02/08/2025     (H) 02/08/2025     (L) 02/08/2025    RBC 3.21 (L) 02/08/2025    MCH 33.6 02/08/2025    MCHC 32.9 02/08/2025    RDW 15.3 (H) 02/08/2025    MPV 12.4 02/08/2025    NRBC 0 02/08/2025     CMP:   Lab Results   Component Value Date    K 3.6 02/08/2025     02/08/2025    CO2 18 (L) 02/08/2025    BUN 25 02/08/2025    CREATININE 4.30 (H) 02/08/2025    CALCIUM 8.2 (L) 02/08/2025     (H) 02/08/2025     (H) 02/08/2025    ALKPHOS 139 (H) 02/08/2025    EGFR 15 02/08/2025       .  Results from last 7 days   Lab Units 02/08/25  0903 02/08/25  0437 02/07/25  2355 02/07/25  0813 02/07/25  0453   POTASSIUM mmol/L 3.6 3.2*  3.3* 3.5   < > 3.5   CHLORIDE mmol/L 104 103  104 103   < > 98   CO2 mmol/L 18* 20*  19* 19*   < > " "20*   BUN mg/dL 25 24  24 23   < > 18   CREATININE mg/dL 4.30* 4.06*  4.06* 3.89*   < > 3.07*   CALCIUM mg/dL 8.2* 8.1*  8.0* 8.3*   < > 9.2   ALK PHOS U/L  --  139*  --   --  177*   ALT U/L  --  288*  --   --  313*   AST U/L  --  913*  --   --  866*    < > = values in this interval not displayed.         Phosphorus:   Lab Results   Component Value Date    PHOS 2.6 (L) 02/08/2025     Magnesium: No results found for: \"MG\"  Urinalysis: No results found for: \"COLORU\", \"CLARITYU\", \"SPECGRAV\", \"PHUR\", \"LEUKOCYTESUR\", \"NITRITE\", \"PROTEINUA\", \"GLUCOSEU\", \"KETONESU\", \"BILIRUBINUR\", \"BLOODU\"  Ionized Calcium: No results found for: \"CAION\"  Coagulation: No results found for: \"PT\", \"INR\", \"APTT\"  Troponin: No results found for: \"TROPONINI\"  ABG: No results found for: \"PHART\", \"DNL8PKN\", \"PO2ART\", \"FWG1FWB\", \"C1SIOQDZ\", \"BEART\", \"SOURCE\"  Radiology review:     IMAGING  Procedure: US kidney and bladder with pvr  Result Date: 2/8/2025  Narrative: RENAL ULTRASOUND WITH PVR INDICATION: GUNJAN and 1.6cm cyst. COMPARISON: None TECHNIQUE: Ultrasound of the retroperitoneum was performed with a curvilinear transducer utilizing volumetric sweeps and still imaging techniques. FINDINGS: KIDNEYS: Symmetric and normal size. Right kidney: 10.4 x 4.9 x 5.4 cm. Volume 142.2 mL Left kidney: 11.6 x 5.6 x 5.5 cm. Volume 185.2 mL Right kidney Limited visualization due to sound beam attenuation by the liver. Normal echogenicity and contour. No gross mass is identified. Known lower pole cyst is not well visualized. No hydronephrosis. No shadowing calculi. No perinephric fluid collections. Left kidney Normal echogenicity and contour. No mass is identified. No hydronephrosis. No shadowing calculi. No perinephric fluid collections. URETERS: Nonvisualized. BLADDER: Normally distended. No focal thickening or mass lesions. The right ureteral jet is visualized. Prevoid: 124.5 No significant post void volume. Measured post void volume in mL: 20.3 "     Impression: Technically limited study. Left lower pole cyst not well visualized. No hydronephrosis. 20 mL post void residual in the bladder. Workstation performed: [id number]     Procedure: US right upper quadrant with liver dopplers  Result Date: 2/8/2025  Narrative: RIGHT UPPER QUADRANT ULTRASOUND WITH LIVER DOPPLER INDICATION: cirrhosis, bili. COMPARISON: None. TECHNIQUE: Real-time ultrasound of the right upper quadrant was performed with a curvilinear transducer with both volumetric sweeps and still imaging techniques. FINDINGS: PANCREAS: Visualized portions of the pancreas are within normal limits. AORTA AND IVC: Visualized portions are normal for patient age. LIVER: Size: Enlarged. The liver measures 20.7 cm in the midclavicular line. Contour: Undulating Parenchyma: Echogenic No liver mass identified. LIVER DOPPLER: The main portal vein and primary branch segments are patent and hepatopetal with normal spectral waveform. Hepatic veins are patent. Spectral waveforms within normal limits. Main hepatic artery appears normal size, patent with normal spectral waveform. BILIARY: No gallbladder findings. No intrahepatic biliary dilatation. CBD measures 2.0 mm. No choledocholithiasis. KIDNEY: Right kidney measures 10.4 x 4.9 x 5.4 cm. Volume 144.2 mL See a separately dictated renal ultrasound report. ASCITES: None.     Impression: Hepatomegaly. Steatosis, also demonstrated on the recent CT. No biliary dilatation. Patent major hepatic vessels with normodirectional flow. Workstation performed: [id number]     Procedure: XR chest 1 view portable  Result Date: 2/7/2025  Narrative: XR CHEST PORTABLE INDICATION: multiple recent falls. COMPARISON: 12/31/2018 FINDINGS: Monitoring leads and clips project over the chest. Clear lungs. No pneumothorax or pleural effusion. Normal cardiomediastinal silhouette. Bones are unremarkable for age. Normal upper abdomen.     Impression: No acute cardiopulmonary disease. Workstation  performed: [id number]     Procedure: CT abdomen pelvis wo contrast  Result Date: 2/7/2025  Narrative: CT ABDOMEN AND PELVIS WITHOUT IV CONTRAST INDICATION: Lower back pain, recent diverticulitis, thigh weakness, r/o mass effect on psoas. COMPARISON: None. TECHNIQUE: CT examination of the abdomen and pelvis was performed without intravenous contrast. Multiplanar 2D reformatted images were created from the source data. This examination, like all CT scans performed in the [facility], was performed utilizing techniques to minimize radiation dose exposure, including the use of iterative reconstruction and automated exposure control. Radiation dose length product (DLP) for this visit: 728 mGy-cm Enteric Contrast: Not administered. FINDINGS: Absence of intravenous contrast enhancement limits evaluation of the abdominal viscera. ABDOMEN LOWER CHEST: No clinically significant abnormality in the visualized lower chest. LIVER/BILIARY TREE: Enlarged liver with diffuse markedly decreased parenchymal density. No suspicious mass. Normal hepatic contours. No biliary dilation. GALLBLADDER: No calcified gallstones. No pericholecystic inflammatory change. SPLEEN: Enlarged spleen measuring 15 cm. Tiny calcified granulomas noted. PANCREAS: Unremarkable. ADRENAL GLANDS: Unremarkable. KIDNEYS/URETERS: Partially exophytic 1.6 cm cyst with density of 34 Hounsfield units at the lower pole of the left kidney. 5 mm right upper pole calculus. No hydronephrosis. STOMACH AND BOWEL: Colonic diverticulosis, with thickening of the mid sigmoid and mild pericolic inflammatory stranding consistent with acute diverticulitis. Inflamed sigmoid abuts the dome of the bladder, with thickening of the bladder wall in this region. No intestinal obstruction. APPENDIX: No findings to suggest appendicitis. ABDOMINOPELVIC CAVITY: No ascites. No pneumoperitoneum. No lymphadenopathy. VESSELS: Unremarkable for patient's age. PELVIS REPRODUCTIVE ORGANS:  Unremarkable for patient's age. URINARY BLADDER: Wall thickening at the dome of the bladder underlying inflamed sigmoid, as above. ABDOMINAL WALL/INGUINAL REGIONS: Small fat-containing umbilical hernia. BONES: No acute fracture or suspicious osseous lesion.     Impression: Acute sigmoid diverticulitis. Inflamed sigmoid abuts the dome of the urinary bladder, with associated bladder wall thickening raising the question of developing fistula. Hepatosplenomegaly with hepatic steatosis. Indeterminate 1.6 cm left renal cyst. Recommend follow-up ultrasound. Nonobstructing right renal calculus. The study was marked in EPIC for immediate notification. Workstation performed: [id number]     Procedure: CT head wo contrast  Result Date: 2/7/2025  Narrative: CT BRAIN - WITHOUT CONTRAST INDICATION:   Multiple recent falls unknown HS. COMPARISON: CT 1/1/2019 MRI 1/2/2019. TECHNIQUE:  CT examination of the brain was performed.  Multiplanar 2D reformatted images were created from the source data. Radiation dose length product (DLP) for this visit:  856 mGy-cm .  This examination, like all CT scans performed in the [facility], was performed utilizing techniques to minimize radiation dose exposure, including the use of iterative reconstruction and automated exposure control. IMAGE QUALITY:  Diagnostic. FINDINGS: PARENCHYMA: Decreased attenuation is noted in periventricular and subcortical white matter demonstrating an appearance that is statistically most likely to represent mild microangiopathic change. Old bilateral basal ganglia lacunar infarcts. No CT signs of acute infarction.  No intracranial mass, mass effect or midline shift.  No acute parenchymal hemorrhage. VENTRICLES AND EXTRA-AXIAL SPACES: Mildly increased prominence of the lateral ventricles since the prior study. No extra-axial collections. VISUALIZED ORBITS: Normal visualized orbits. PARANASAL SINUSES: Normal visualized paranasal sinuses. CALVARIUM AND  EXTRACRANIAL SOFT TISSUES: Normal.     Impression: No acute intracranial abnormality. Mildly increased ventricular prominence since the prior studies raising the question of normal pressure hydrocephalus. Workstation performed: [id number]         Current Facility-Administered Medications:     cefTRIAXone (ROCEPHIN) IVPB (premix in dextrose) 1,000 mg 50 mL, Q24H, Last Rate: 1,000 mg (02/08/25 0854)    clopidogrel (PLAVIX) tablet 75 mg, Daily    folic acid (FOLVITE) tablet 1 mg, Daily    heparin (porcine) subcutaneous injection 5,000 Units, Q8H DAVION **AND** [COMPLETED] Platelet count, Once    metoprolol succinate (TOPROL-XL) 24 hr tablet 25 mg, Daily    metroNIDAZOLE (FLAGYL) tablet 500 mg, Q8H DAVION    nicotine (NICODERM CQ) 21 mg/24 hr TD 24 hr patch 1 patch, Daily    pantoprazole (PROTONIX) EC tablet 40 mg, Early Morning    potassium chloride (Klor-Con M20) CR tablet 40 mEq, BID    saccharomyces boulardii (FLORASTOR) capsule 250 mg, BID    sodium chloride 0.9 % infusion, Continuous, Last Rate: 100 mL/hr (02/08/25 1151)    thiamine tablet 100 mg, Daily  Medications Discontinued During This Encounter   Medication Reason    thiamine (VITAMIN B1) 100 mg in sodium chloride 0.9 % 50 mL IVPB     folic acid 1 mg in sodium chloride 0.9 % 50 mL IVPB     sodium chloride 0.9 % infusion     multi-electrolyte (PLASMALYTE-A/ISOLYTE-S PH 7.4) IV solution     vancomycin (VANCOCIN) capsule 125 mg        Luciano Guadarrama, DO      This progress note was produced in part using a dictation device which may document imprecise wording from author's original intent.

## 2025-02-08 NOTE — ASSESSMENT & PLAN NOTE
Patient with severe diarrhea , will utilize rectal tube   Start banana flakes and Probiotics  C.diff Negative  Follow up enteric panel   Follow up fecal WBC

## 2025-02-08 NOTE — ASSESSMENT & PLAN NOTE
Continue with isotonic saline currently with normal saline.  May continue with fluid restriction at this time.

## 2025-02-08 NOTE — CASE MANAGEMENT
Case Management Assessment    Patient name Emiliano Rodriguez  Location /404-01 MRN [id number]  : 1975 Date 2025       Current Admission Date: 2025  Current Admission Diagnosis:Gastroesophageal reflux disease without esophagitis   Patient Active Problem List    Diagnosis Date Noted Date Diagnosed    Acute renal failure (ARF) (HCC) 2025     Hyponatremia 2025     Rhabdomyolysis 2025     Abnormal LFTs 2025     Acute diverticulitis 2025     Diarrhea 2025     Steatosis of liver 2025     Hepatomegaly 2025     CVA (cerebral vascular accident) (HCC) 2018     Gastroesophageal reflux disease without esophagitis 2018     TIA (transient ischemic attack) 2018     Alcohol abuse 2018     Tobacco abuse 2018       LOS (days): 1  Geometric Mean LOS (GMLOS) (days): 3  Days to GMLOS:2     OBJECTIVE:    Risk of Unplanned Readmission Score: 14.06         Current admission status: Inpatient       Preferred Pharmacy:   [id number] [address] [address]  Phone: [phone] Fax: [phone]    Primary Care Provider: Emmett Jimenez DO    Primary Insurance: [employer]  Secondary Insurance:     ASSESSMENT:  Active Health Care Proxies       Laura Rodriguez Health Care Representative - Mother   Primary Phone: [phone] (Mobile)                                                                Social Determinants of Health (SDOH)      Flowsheet Row Most Recent Value   Housing Stability    In the last 12 months, was there a time when you were not able to pay the mortgage or rent on time? N   In the past 12 months, how many times have you moved where you were living? 1   At any time in the past 12 months, were you homeless or living in a shelter (including now)? N   Transportation Needs    In the past 12 months, has lack of transportation kept you from medical  appointments or from getting medications? yes  [provided pt with STS application.]   In the past 12 months, has lack of transportation kept you from meetings, work, or from getting things needed for daily living? Yes   Food Insecurity    Within the past 12 months, you worried that your food would run out before you got the money to buy more. Never true  [patient gets food stamps]   Within the past 12 months, the food you bought just didn't last and you didn't have money to get more. Never true   Utilities    In the past 12 months has the electric, gas, oil, or water company threatened to shut off services in your home? No

## 2025-02-08 NOTE — ASSESSMENT & PLAN NOTE
As noted above this is most likely etiology of the patient's volume losses,  the patient's C. difficile toxin was negative, continue supportive care at this time.

## 2025-02-08 NOTE — ASSESSMENT & PLAN NOTE
Creatinine slightly increased over the last 24 hours, previously, from yesterday at the patient's fractional secretion of sodium was noted to be greater than 2% indicating potential underlying acute tubular necrosis, will reassess those labs today.  Upon visual inspection of his urine, it was dark and of low volume, which is more indicative of a prerenal state.  Will increase patient's IV fluids from 100 mL/hr up to 150 mL/hour.  Once the patient appears to be adequately volume resuscitated we can reduce back down.  In the meantime I believe GI losses as the likely source of his volume depletion.

## 2025-02-08 NOTE — ASSESSMENT & PLAN NOTE
Patient getting oral potassium supplementation, level from this morning has improved from 3.2 up to 3.6 mmol/L, continue to monitor for now.

## 2025-02-08 NOTE — ASSESSMENT & PLAN NOTE
As noted above this is the most likely etiology of the patient's acute kidney injury, will need to have supportive care and treatment going forward.  Although the patient's creatinine continues to increase, I believe this may be in part due to true volume depleted state due to GI losses.  Increase IV fluids for now, look to discontinue once the patient's clinical volume status appears to be appropriate.

## 2025-02-08 NOTE — ASSESSMENT & PLAN NOTE
Patient with hyperbilirbuinemia and elevated LFT in the setting of rhabdomyolysis and ETOH use    Likely due to alcohol use and FLD   Follow up  RUQ US with dopplers  Check acute and chronic hep panel  Avoid Hepatotoxins (lipitor / Tylenol)  GI consult

## 2025-02-08 NOTE — ASSESSMENT & PLAN NOTE
Continue with proton pump inhibitor, gastroenterology evaluated the patient, will need to have an endoscopy as an outpatient.

## 2025-02-08 NOTE — ASSESSMENT & PLAN NOTE
Likely due to prerenal azotemia and rhabdomyolysis, case was reviewed with nephrology due to hyponatremia and concern for overcorrection    Patient received 1.5 L fluid bolus, placed at 150 cc/hour, repeat sodium 130, decreased IV flow rate to 100cc/hr     Na level stable and improving appropriately   NS @ 100cc/hr   Nephro on board  Follow up renal US

## 2025-02-08 NOTE — PLAN OF CARE
Problem: RESPIRATORY - ADULT  Goal: Achieves optimal ventilation and oxygenation  Description: INTERVENTIONS:  - Assess for changes in respiratory status  - Assess for changes in mentation and behavior  - Position to facilitate oxygenation and minimize respiratory effort  - Oxygen administered by appropriate delivery if ordered  - Initiate smoking cessation education as indicated  - Encourage broncho-pulmonary hygiene including cough, deep breathe, Incentive Spirometry  - Assess the need for suctioning and aspirate as needed  - Assess and instruct to report SOB or any respiratory difficulty  - Respiratory Therapy support as indicated  Outcome: Progressing     Problem: SKIN/TISSUE INTEGRITY - ADULT  Goal: Skin Integrity remains intact(Skin Breakdown Prevention)  Description: Assess:  -Perform Tani assessment every shift  -Clean and moisturize skin every shift  -Inspect skin when repositioning, toileting, and assisting with ADLS  -Assess under medical devices such as masimo every hour  -Assess extremities for adequate circulation and sensation     Bed Management:  -Have minimal linens on bed & keep smooth, unwrinkled  -Change linens as needed when moist or perspiring  -Avoid sitting or lying in one position for more than 30 hours while in bed  -Keep HOB at 30 degrees     Toileting:  -Offer bedside commode  -Assess for incontinence every hour  -Use incontinent care products after each incontinent episode such as cream    Activity:  -Mobilize patient 3 times a day  -Encourage activity and walks on unit  -Encourage or provide ROM exercises   -Turn and reposition patient every 2 Hours  -Use appropriate equipment to lift or move patient in bed  -Instruct/ Assist with weight shifting every hour when out of bed in chair  -Consider limitation of chair time 2 hour intervals    Skin Care:  -Avoid use of baby powder, tape, friction and shearing, hot water or constrictive clothing  -Relieve pressure over bony prominences  using cushion  -Do not massage red bony areas    Next Steps:  -Teach patient strategies to minimize risks such as repositioning   -Consider consults to  interdisciplinary teams such as pt/ot  Outcome: Progressing     Problem: MUSCULOSKELETAL - ADULT  Goal: Maintain or return mobility to safest level of function  Description: INTERVENTIONS:  - Assess patient's ability to carry out ADLs; assess patient's baseline for ADL function and identify physical deficits which impact ability to perform ADLs (bathing, care of mouth/teeth, toileting, grooming, dressing, etc.)  - Assess/evaluate cause of self-care deficits   - Assess range of motion  - Assess patient's mobility  - Assess patient's need for assistive devices and provide as appropriate  - Encourage maximum independence but intervene and supervise when necessary  - Involve family in performance of ADLs  - Assess for home care needs following discharge   - Consider OT consult to assist with ADL evaluation and planning for discharge  - Provide patient education as appropriate  Outcome: Progressing     Problem: PAIN - ADULT  Goal: Verbalizes/displays adequate comfort level or baseline comfort level  Description: Interventions:  - Encourage patient to monitor pain and request assistance  - Assess pain using appropriate pain scale  - Administer analgesics based on type and severity of pain and evaluate response  - Implement non-pharmacological measures as appropriate and evaluate response  - Consider cultural and social influences on pain and pain management  - Notify physician/advanced practitioner if interventions unsuccessful or patient reports new pain  Outcome: Progressing     Problem: INFECTION - ADULT  Goal: Absence or prevention of progression during hospitalization  Description: INTERVENTIONS:  - Assess and monitor for signs and symptoms of infection  - Monitor lab/diagnostic results  - Monitor all insertion sites, i.e. indwelling lines, tubes, and drains  -  Monitor endotracheal if appropriate and nasal secretions for changes in amount and color  - [place] appropriate cooling/warming therapies per order  - Administer medications as ordered  - Instruct and encourage patient and family to use good hand hygiene technique  - Identify and instruct in appropriate isolation precautions for identified infection/condition  Outcome: Progressing     Problem: SAFETY ADULT  Goal: Patient will remain free of falls  Description: INTERVENTIONS:  - Educate patient/family on patient safety including physical limitations  - Instruct patient to call for assistance with activity   - Consult OT/PT to assist with strengthening/mobility   - Keep Call bell within reach  - Keep bed low and locked with side rails adjusted as appropriate  - Keep care items and personal belongings within reach  - Initiate and maintain comfort rounds  - Make Fall Risk Sign visible to staff  - Offer Toileting every 2 Hours, in advance of need  - Initiate/Maintain bed alarm  - Obtain necessary fall risk management equipment: bed alarm  - Apply yellow socks and bracelet for high fall risk patients  - Consider moving patient to room near nurses station  Outcome: Progressing  Goal: Maintain or return to baseline ADL function  Description: INTERVENTIONS:  -  Assess patient's ability to carry out ADLs; assess patient's baseline for ADL function and identify physical deficits which impact ability to perform ADLs (bathing, care of mouth/teeth, toileting, grooming, dressing, etc.)  - Assess/evaluate cause of self-care deficits   - Assess range of motion  - Assess patient's mobility; develop plan if impaired  - Assess patient's need for assistive devices and provide as appropriate  - Encourage maximum independence but intervene and supervise when necessary  - Involve family in performance of ADLs  - Assess for home care needs following discharge   - Consider OT consult to assist with ADL evaluation and planning for  discharge  - Provide patient education as appropriate  Outcome: Progressing  Goal: Maintains/Returns to pre admission functional level  Description: INTERVENTIONS:  - Perform AM-PAC 6 Click Basic Mobility/ Daily Activity assessment daily.  - Set and communicate daily mobility goal to care team and patient/family/caregiver.   - Collaborate with rehabilitation services on mobility goals if consulted  - Perform Range of Motion 3 times a day.  - Reposition patient every 2 hours.  - Dangle patient 3 times a day  - Stand patient 3 times a day  - Ambulate patient 3 times a day  - Out of bed to chair 3 times a day   - Out of bed for meals 3 times a day  - Out of bed for toileting  - Record patient progress and toleration of activity level   Outcome: Progressing     Problem: DISCHARGE PLANNING  Goal: Discharge to home or other facility with appropriate resources  Description: INTERVENTIONS:  - Identify barriers to discharge w/patient and caregiver  - Arrange for needed discharge resources and transportation as appropriate  - Identify discharge learning needs (meds, wound care, etc.)  - Arrange for interpretive services to assist at discharge as needed  - Refer to Case Management Department for coordinating discharge planning if the patient needs post-hospital services based on physician/advanced practitioner order or complex needs related to functional status, cognitive ability, or social support system  Outcome: Progressing     Problem: Knowledge Deficit  Goal: Patient/family/caregiver demonstrates understanding of disease process, treatment plan, medications, and discharge instructions  Description: Complete learning assessment and assess knowledge base.  Interventions:  - Provide teaching at level of understanding  - Provide teaching via preferred learning methods  Outcome: Progressing

## 2025-02-08 NOTE — PROGRESS NOTES
Progress Note - Hospitalist   Name: Emiliano Rodriguez 49 y.o. male I MRN: [id number]  Unit/Bed#: 404-01 I Date of Admission: 2/7/2025   Date of Service: 2/8/2025 I Hospital Day: 1    Assessment & Plan  Acute renal failure (ARF) (HCC)  Likely due to prerenal azotemia and rhabdomyolysis, case was reviewed with nephrology due to hyponatremia and concern for overcorrection    Patient received 1.5 L fluid bolus, placed at 150 cc/hour, repeat sodium 130, decreased IV flow rate to 100cc/hr     Na level stable and improving appropriately   NS @ 100cc/hr   Nephro on board  Follow up renal US   Hyponatremia  Resolving   Rhabdomyolysis  CK improving, continue volume expansion  Appreciate nephrology assistance   Abnormal LFTs  Patient with hyperbilirbuinemia and elevated LFT in the setting of rhabdomyolysis and ETOH use    Likely due to alcohol use and FLD   Follow up  RUQ US with dopplers  Check acute and chronic hep panel  Avoid Hepatotoxins (lipitor / Tylenol)  GI consult   Gastroesophageal reflux disease without esophagitis  Protonix 40 mg daily  GI consult appreciated   Outpatient EGD  Alcohol abuse  Endorses at least 5 beers a day, although I suspect this is understated  Folic acid, thiamine  Initiate CIWA protocol  Acute diverticulitis  Day 2 Ceftriaxone Flagyl   Follow up stool studies  See Treatment plan for diarrhea     Diarrhea  Patient with severe diarrhea , will utilize rectal tube   Start banana flakes and Probiotics  C.diff Negative  Follow up enteric panel   Follow up fecal WBC     VTE Pharmacologic Prophylaxis:   Moderate Risk (Score 3-4) - Pharmacological DVT Prophylaxis Ordered: heparin.    Mobility:   Basic Mobility Inpatient Raw Score: 18  JH-HLM Goal: 6: Walk 10 steps or more  JH-HLM Achieved: 2: Bed activities/Dependent transfer  JH-HLM Goal achieved. Continue to encourage appropriate mobility.    Patient Centered Rounds: I performed bedside rounds with nursing staff today.   Discussions with Specialists  "or Other Care Team Provider:     Education and Discussions with Family / Patient: Attempted to update  (mother) via phone. Unable to contact.    Current Length of Stay: 1 day(s)  Current Patient Status: Inpatient   Certification Statement: The patient will continue to require additional inpatient hospital stay due to GUNJAN  Discharge Plan: TBD    Code Status: Level 1 - Full Code    Subjective   Seen and examined, mental status has improved but he complains of diarrhea that \"is pouring out of me\"    Objective :  Temp:  [97.5 °F (36.4 °C)-97.7 °F (36.5 °C)] 97.5 °F (36.4 °C)  HR:  [75-83] 83  BP: (117-139)/(71-87) 117/87  Resp:  [18-20] 20  SpO2:  [92 %-98 %] 95 %  O2 Device: None (Room air)    Body mass index is 32.35 kg/m².     Input and Output Summary (last 24 hours):     Intake/Output Summary (Last 24 hours) at 2/8/2025 1116  Last data filed at 2/8/2025 0900  Gross per 24 hour   Intake 240 ml   Output 500 ml   Net -260 ml       Physical Exam  Constitutional:       General: He is not in acute distress.  Eyes:      General: Scleral icterus present.   Cardiovascular:      Rate and Rhythm: Normal rate and regular rhythm.      Pulses: Normal pulses.   Pulmonary:      Effort: Pulmonary effort is normal. No respiratory distress.      Breath sounds: No wheezing.   Abdominal:      General: Abdomen is flat. Bowel sounds are normal. There is distension.      Tenderness: There is no abdominal tenderness. There is no right CVA tenderness, left CVA tenderness or guarding.   Musculoskeletal:         General: No swelling.      Right lower leg: No edema.      Left lower leg: No edema.   Skin:     Capillary Refill: Capillary refill takes 2 to 3 seconds.   Neurological:      General: No focal deficit present.      Mental Status: He is alert. Mental status is at baseline.           Lines/Drains:  Lines/Drains/Airways       Active Status       Name Placement date Placement time Site Days    Rectal Tube 02/08/25  1018  --  " less than 1                            Lab Results: I have reviewed the following results:   Results from last 7 days   Lab Units 02/08/25  0437   WBC Thousand/uL 6.95   HEMOGLOBIN g/dL 10.8*   HEMATOCRIT % 32.8*   PLATELETS Thousands/uL 108*   SEGS PCT % 78*   LYMPHO PCT % 12*   MONO PCT % 8   EOS PCT % 2     Results from last 7 days   Lab Units 02/08/25  0903 02/08/25  0437   SODIUM mmol/L 132* 132*  133*   POTASSIUM mmol/L 3.6 3.2*  3.3*   CHLORIDE mmol/L 104 103  104   CO2 mmol/L 18* 20*  19*   BUN mg/dL 25 24  24   CREATININE mg/dL 4.30* 4.06*  4.06*   ANION GAP mmol/L 10 9  10   CALCIUM mg/dL 8.2* 8.1*  8.0*   ALBUMIN g/dL  --  2.1*   TOTAL BILIRUBIN mg/dL  --  4.21*   ALK PHOS U/L  --  139*   ALT U/L  --  288*   AST U/L  --  913*   GLUCOSE RANDOM mg/dL 87 74  75                 Results from last 7 days   Lab Units 02/07/25  0453   LACTIC ACID mmol/L 1.3   PROCALCITONIN ng/ml 3.09*       Recent Cultures (last 7 days):   Results from last 7 days   Lab Units 02/07/25  0906   C DIFF TOXIN B BY PCR  Negative       Imaging Results Review: I personally reviewed the following image studies in PACS and associated radiology reports: Ultrasound(s). My interpretation of the radiology images/reports is: reviewed images, and asked rads to have official read.      Last 24 Hours Medication List:     Current Facility-Administered Medications:     cefTRIAXone (ROCEPHIN) IVPB (premix in dextrose) 1,000 mg 50 mL, Q24H, Last Rate: 1,000 mg (02/08/25 0854)    clopidogrel (PLAVIX) tablet 75 mg, Daily    folic acid (FOLVITE) tablet 1 mg, Daily    heparin (porcine) subcutaneous injection 5,000 Units, Q8H DAVION **AND** [COMPLETED] Platelet count, Once    metoprolol succinate (TOPROL-XL) 24 hr tablet 25 mg, Daily    metroNIDAZOLE (FLAGYL) tablet 500 mg, Q8H DAVION    nicotine (NICODERM CQ) 21 mg/24 hr TD 24 hr patch 1 patch, Daily    pantoprazole (PROTONIX) EC tablet 40 mg, Early Morning    saccharomyces boulardii (FLORASTOR)  capsule 250 mg, BID    sodium chloride 0.9 % infusion, Continuous, Last Rate: 100 mL/hr (02/08/25 0223)    thiamine tablet 100 mg, Daily    Administrative Statements   Today, Patient Was Seen By: Ankita Katz MD      **Please Note: This note may have been constructed using a voice recognition system.**

## 2025-02-09 LAB
A1AT SERPL-MCNC: 195 MG/DL (ref 101–187)
ALBUMIN SERPL BCG-MCNC: 2.1 G/DL (ref 3.5–5)
ALP SERPL-CCNC: 121 U/L (ref 34–104)
ALT SERPL W P-5'-P-CCNC: 334 U/L (ref 7–52)
ANION GAP SERPL CALCULATED.3IONS-SCNC: 10 MMOL/L (ref 4–13)
AST SERPL W P-5'-P-CCNC: 1012 U/L (ref 13–39)
BASOPHILS # BLD AUTO: 0.05 THOUSANDS/ΜL (ref 0–0.1)
BASOPHILS NFR BLD AUTO: 1 % (ref 0–1)
BILIRUB SERPL-MCNC: 3.25 MG/DL (ref 0.2–1)
BUN SERPL-MCNC: 29 MG/DL (ref 5–25)
CALCIUM ALBUM COR SERPL-MCNC: 9.1 MG/DL (ref 8.3–10.1)
CALCIUM SERPL-MCNC: 7.6 MG/DL (ref 8.4–10.2)
CERULOPLASMIN SERPL-MCNC: 10.4 MG/DL (ref 16–31)
CHLORIDE SERPL-SCNC: 110 MMOL/L (ref 96–108)
CK SERPL-CCNC: ABNORMAL U/L (ref 39–308)
CO2 SERPL-SCNC: 15 MMOL/L (ref 21–32)
CREAT SERPL-MCNC: 4.66 MG/DL (ref 0.6–1.3)
EOSINOPHIL # BLD AUTO: 0.12 THOUSAND/ΜL (ref 0–0.61)
EOSINOPHIL NFR BLD AUTO: 1 % (ref 0–6)
ERYTHROCYTE [DISTWIDTH] IN BLOOD BY AUTOMATED COUNT: 15.5 % (ref 11.6–15.1)
GFR SERPL CREATININE-BSD FRML MDRD: 13 ML/MIN/1.73SQ M
GLUCOSE SERPL-MCNC: 70 MG/DL (ref 65–140)
HCT VFR BLD AUTO: 33.2 % (ref 36.5–49.3)
HGB BLD-MCNC: 10.6 G/DL (ref 12–17)
IMM GRANULOCYTES # BLD AUTO: 0.07 THOUSAND/UL (ref 0–0.2)
IMM GRANULOCYTES NFR BLD AUTO: 1 % (ref 0–2)
LYMPHOCYTES # BLD AUTO: 0.77 THOUSANDS/ΜL (ref 0.6–4.47)
LYMPHOCYTES NFR BLD AUTO: 9 % (ref 14–44)
MAGNESIUM SERPL-MCNC: 1.6 MG/DL (ref 1.9–2.7)
MCH RBC QN AUTO: 33.3 PG (ref 26.8–34.3)
MCHC RBC AUTO-ENTMCNC: 31.9 G/DL (ref 31.4–37.4)
MCV RBC AUTO: 104 FL (ref 82–98)
MONOCYTES # BLD AUTO: 0.65 THOUSAND/ΜL (ref 0.17–1.22)
MONOCYTES NFR BLD AUTO: 8 % (ref 4–12)
NEUTROPHILS # BLD AUTO: 6.73 THOUSANDS/ΜL (ref 1.85–7.62)
NEUTS SEG NFR BLD AUTO: 80 % (ref 43–75)
NRBC BLD AUTO-RTO: 0 /100 WBCS
PHOSPHATE SERPL-MCNC: 3 MG/DL (ref 2.7–4.5)
PLATELET # BLD AUTO: 118 THOUSANDS/UL (ref 149–390)
PMV BLD AUTO: 11.9 FL (ref 8.9–12.7)
POTASSIUM SERPL-SCNC: 4 MMOL/L (ref 3.5–5.3)
PROT SERPL-MCNC: 5.2 G/DL (ref 6.4–8.4)
RBC # BLD AUTO: 3.18 MILLION/UL (ref 3.88–5.62)
SODIUM SERPL-SCNC: 135 MMOL/L (ref 135–147)
WBC # BLD AUTO: 8.39 THOUSAND/UL (ref 4.31–10.16)

## 2025-02-09 PROCEDURE — 83735 ASSAY OF MAGNESIUM: CPT | Performed by: FAMILY MEDICINE

## 2025-02-09 PROCEDURE — 84100 ASSAY OF PHOSPHORUS: CPT | Performed by: INTERNAL MEDICINE

## 2025-02-09 PROCEDURE — 80053 COMPREHEN METABOLIC PANEL: CPT | Performed by: FAMILY MEDICINE

## 2025-02-09 PROCEDURE — 82550 ASSAY OF CK (CPK): CPT | Performed by: FAMILY MEDICINE

## 2025-02-09 PROCEDURE — 99233 SBSQ HOSP IP/OBS HIGH 50: CPT | Performed by: INTERNAL MEDICINE

## 2025-02-09 PROCEDURE — 99232 SBSQ HOSP IP/OBS MODERATE 35: CPT | Performed by: FAMILY MEDICINE

## 2025-02-09 PROCEDURE — 85025 COMPLETE CBC W/AUTO DIFF WBC: CPT | Performed by: FAMILY MEDICINE

## 2025-02-09 RX ORDER — MAGNESIUM HYDROXIDE/ALUMINUM HYDROXICE/SIMETHICONE 120; 1200; 1200 MG/30ML; MG/30ML; MG/30ML
30 SUSPENSION ORAL EVERY 4 HOURS PRN
Status: DISCONTINUED | OUTPATIENT
Start: 2025-02-09 | End: 2025-02-14

## 2025-02-09 RX ORDER — ACETAMINOPHEN 325 MG/1
650 TABLET ORAL EVERY 6 HOURS PRN
Status: DISCONTINUED | OUTPATIENT
Start: 2025-02-09 | End: 2025-02-19 | Stop reason: HOSPADM

## 2025-02-09 RX ORDER — MAGNESIUM SULFATE HEPTAHYDRATE 40 MG/ML
2 INJECTION, SOLUTION INTRAVENOUS ONCE
Status: COMPLETED | OUTPATIENT
Start: 2025-02-09 | End: 2025-02-09

## 2025-02-09 RX ORDER — ACETAMINOPHEN 325 MG/1
TABLET ORAL
Status: DISCONTINUED
Start: 2025-02-09 | End: 2025-02-09 | Stop reason: WASHOUT

## 2025-02-09 RX ADMIN — MAGNESIUM SULFATE HEPTAHYDRATE 2 G: 40 INJECTION, SOLUTION INTRAVENOUS at 08:26

## 2025-02-09 RX ADMIN — THIAMINE HCL TAB 100 MG 100 MG: 100 TAB at 08:26

## 2025-02-09 RX ADMIN — CLOPIDOGREL 75 MG: 75 TABLET ORAL at 08:26

## 2025-02-09 RX ADMIN — SODIUM BICARBONATE 75 ML/HR: 84 INJECTION, SOLUTION INTRAVENOUS at 11:28

## 2025-02-09 RX ADMIN — CEFTRIAXONE 1000 MG: 1 INJECTION, SOLUTION INTRAVENOUS at 08:26

## 2025-02-09 RX ADMIN — SODIUM CHLORIDE 150 ML/HR: 0.9 INJECTION, SOLUTION INTRAVENOUS at 13:12

## 2025-02-09 RX ADMIN — Medication 250 MG: at 17:24

## 2025-02-09 RX ADMIN — Medication 250 MG: at 08:26

## 2025-02-09 RX ADMIN — METRONIDAZOLE 500 MG: 500 TABLET ORAL at 21:02

## 2025-02-09 RX ADMIN — ALUMINUM HYDROXIDE, MAGNESIUM HYDROXIDE, AND DIMETHICONE 30 ML: 200; 20; 200 SUSPENSION ORAL at 21:02

## 2025-02-09 RX ADMIN — SODIUM CHLORIDE 150 ML/HR: 0.9 INJECTION, SOLUTION INTRAVENOUS at 20:05

## 2025-02-09 RX ADMIN — METRONIDAZOLE 500 MG: 500 TABLET ORAL at 14:24

## 2025-02-09 RX ADMIN — SODIUM CHLORIDE 150 ML/HR: 0.9 INJECTION, SOLUTION INTRAVENOUS at 01:35

## 2025-02-09 RX ADMIN — FOLIC ACID 1 MG: 1 TABLET ORAL at 08:26

## 2025-02-09 RX ADMIN — METRONIDAZOLE 500 MG: 500 TABLET ORAL at 06:05

## 2025-02-09 RX ADMIN — PANTOPRAZOLE SODIUM 40 MG: 40 TABLET, DELAYED RELEASE ORAL at 06:05

## 2025-02-09 NOTE — PLAN OF CARE
Problem: RESPIRATORY - ADULT  Goal: Achieves optimal ventilation and oxygenation  Description: INTERVENTIONS:  - Assess for changes in respiratory status  - Assess for changes in mentation and behavior  - Position to facilitate oxygenation and minimize respiratory effort  - Oxygen administered by appropriate delivery if ordered  - Initiate smoking cessation education as indicated  - Encourage broncho-pulmonary hygiene including cough, deep breathe, Incentive Spirometry  - Assess the need for suctioning and aspirate as needed  - Assess and instruct to report SOB or any respiratory difficulty  - Respiratory Therapy support as indicated  2/8/2025 2221 by Aneesh Oliveros RN  Outcome: Progressing  2/8/2025 2220 by Aneesh Oliveros RN  Outcome: Progressing     Problem: SKIN/TISSUE INTEGRITY - ADULT  Goal: Skin Integrity remains intact(Skin Breakdown Prevention)  Description: Assess:  -Perform Tani assessment every 2  -Clean and moisturize skin every incont. Episode   -Inspect skin when repositioning, toileting, and assisting with ADLS  -Assess extremities for adequate circulation and sensation     Bed Management:  -Have minimal linens on bed & keep smooth, unwrinkled  -Change linens as needed when moist or perspiring  -Avoid sitting or lying in one position for more than 2 hours while in bed  -Keep HOB at 30 degrees     Toileting:  -Offer bedside commode  -Assess for incontinence every shift  -Use incontinent care products after each incontinent episode such as alejandra wipes    Activity:  -Mobilize patient 3 times a day  -Encourage activity and walks on unit  -Encourage or provide ROM exercises   -Turn and reposition patient every 2 Hours  -Use appropriate equipment to lift or move patient in bed  -Instruct/ Assist with weight shifting every 60 mins when out of bed in chair  -Consider limitation of chair time 6 hour intervals    Skin Care:  -Avoid use of baby powder, tape, friction and shearing, hot water or constrictive  clothing  -Relieve pressure over bony prominences using foam wedges/pillows  -Do not massage red bony areas    2/8/2025 2221 by Aneesh Oliveros RN  Outcome: Progressing  2/8/2025 2220 by Aneesh Oliveros RN  Outcome: Progressing     Problem: MUSCULOSKELETAL - ADULT  Goal: Maintain or return mobility to safest level of function  Description: INTERVENTIONS:  - Assess patient's ability to carry out ADLs; assess patient's baseline for ADL function and identify physical deficits which impact ability to perform ADLs (bathing, care of mouth/teeth, toileting, grooming, dressing, etc.)  - Assess/evaluate cause of self-care deficits   - Assess range of motion  - Assess patient's mobility  - Assess patient's need for assistive devices and provide as appropriate  - Encourage maximum independence but intervene and supervise when necessary  - Involve family in performance of ADLs  - Assess for home care needs following discharge   - Consider OT consult to assist with ADL evaluation and planning for discharge  - Provide patient education as appropriate  2/8/2025 2221 by Aneesh Oliverso RN  Outcome: Progressing  2/8/2025 2220 by Aneesh Oliveros RN  Outcome: Progressing     Problem: PAIN - ADULT  Goal: Verbalizes/displays adequate comfort level or baseline comfort level  Description: Interventions:  - Encourage patient to monitor pain and request assistance  - Assess pain using appropriate pain scale  - Administer analgesics based on type and severity of pain and evaluate response  - Implement non-pharmacological measures as appropriate and evaluate response  - Consider cultural and social influences on pain and pain management  - Notify physician/advanced practitioner if interventions unsuccessful or patient reports new pain  2/8/2025 2221 by Aneesh Oliveros RN  Outcome: Progressing  2/8/2025 2220 by Aneesh Oliveros RN  Outcome: Progressing     Problem: INFECTION - ADULT  Goal: Absence or prevention of progression during  hospitalization  Description: INTERVENTIONS:  - Assess and monitor for signs and symptoms of infection  - Monitor lab/diagnostic results  - Monitor all insertion sites, i.e. indwelling lines, tubes, and drains  - Monitor endotracheal if appropriate and nasal secretions for changes in amount and color  - [place] appropriate cooling/warming therapies per order  - Administer medications as ordered  - Instruct and encourage patient and family to use good hand hygiene technique  - Identify and instruct in appropriate isolation precautions for identified infection/condition  2/8/2025 2221 by Aneesh Oliveros RN  Outcome: Progressing  2/8/2025 2220 by Aneesh Oliveros RN  Outcome: Progressing     Problem: SAFETY ADULT  Goal: Patient will remain free of falls  Description: INTERVENTIONS:  - Educate patient/family on patient safety including physical limitations  - Instruct patient to call for assistance with activity   - Consult OT/PT to assist with strengthening/mobility   - Keep Call bell within reach  - Keep bed low and locked with side rails adjusted as appropriate  - Keep care items and personal belongings within reach  - Initiate and maintain comfort rounds  - Make Fall Risk Sign visible to staff  - Offer Toileting every 2 Hours, in advance of need  - Initiate/Maintain bed/chair alarm  - Obtain necessary fall risk management equipment: non skid socks  - Apply yellow socks and bracelet for high fall risk patients  - Consider moving patient to room near nurses station  2/8/2025 2221 by Aneesh Oliveros RN  Outcome: Progressing  2/8/2025 2220 by Aneesh Oliveros RN  Outcome: Progressing  Goal: Maintain or return to baseline ADL function  Description: INTERVENTIONS:  -  Assess patient's ability to carry out ADLs; assess patient's baseline for ADL function and identify physical deficits which impact ability to perform ADLs (bathing, care of mouth/teeth, toileting, grooming, dressing, etc.)  - Assess/evaluate cause of  self-care deficits   - Assess range of motion  - Assess patient's mobility; develop plan if impaired  - Assess patient's need for assistive devices and provide as appropriate  - Encourage maximum independence but intervene and supervise when necessary  - Involve family in performance of ADLs  - Assess for home care needs following discharge   - Consider OT consult to assist with ADL evaluation and planning for discharge  - Provide patient education as appropriate  2/8/2025 2221 by Aneesh Oliveros RN  Outcome: Progressing  2/8/2025 2220 by Aneesh Oliveros RN  Outcome: Progressing  Goal: Maintains/Returns to pre admission functional level  Description: INTERVENTIONS:  - Perform AM-PAC 6 Click Basic Mobility/ Daily Activity assessment daily.  - Set and communicate daily mobility goal to care team and patient/family/caregiver.   - Collaborate with rehabilitation services on mobility goals if consulted  - Perform Range of Motion 3 times a day.  - Reposition patient every 2 hours.  - Dangle patient 3 times a day  - Stand patient 3 times a day  - Ambulate patient 3 times a day  - Out of bed to chair 3 times a day   - Out of bed for meals 3 times a day  - Out of bed for toileting  - Record patient progress and toleration of activity level   2/8/2025 2221 by Aneesh Oliveros RN  Outcome: Progressing  2/8/2025 2220 by Aneesh Oliveros RN  Outcome: Progressing     Problem: DISCHARGE PLANNING  Goal: Discharge to home or other facility with appropriate resources  Description: INTERVENTIONS:  - Identify barriers to discharge w/patient and caregiver  - Arrange for needed discharge resources and transportation as appropriate  - Identify discharge learning needs (meds, wound care, etc.)  - Arrange for interpretive services to assist at discharge as needed  - Refer to Case Management Department for coordinating discharge planning if the patient needs post-hospital services based on physician/advanced practitioner order or complex needs  related to functional status, cognitive ability, or social support system  2/8/2025 2221 by Aneesh Oliveros RN  Outcome: Progressing  2/8/2025 2220 by Aneesh Oliveros RN  Outcome: Progressing     Problem: Knowledge Deficit  Goal: Patient/family/caregiver demonstrates understanding of disease process, treatment plan, medications, and discharge instructions  Description: Complete learning assessment and assess knowledge base.  Interventions:  - Provide teaching at level of understanding  - Provide teaching via preferred learning methods  2/8/2025 2221 by Aneesh Oliveros RN  Outcome: Progressing  2/8/2025 2220 by Aneesh Oliveros RN  Outcome: Progressing

## 2025-02-09 NOTE — PLAN OF CARE
Problem: RESPIRATORY - ADULT  Goal: Achieves optimal ventilation and oxygenation  Description: INTERVENTIONS:  - Assess for changes in respiratory status  - Assess for changes in mentation and behavior  - Position to facilitate oxygenation and minimize respiratory effort  - Oxygen administered by appropriate delivery if ordered  - Initiate smoking cessation education as indicated  - Encourage broncho-pulmonary hygiene including cough, deep breathe, Incentive Spirometry  - Assess the need for suctioning and aspirate as needed  - Assess and instruct to report SOB or any respiratory difficulty  - Respiratory Therapy support as indicated  Outcome: Progressing     Problem: SKIN/TISSUE INTEGRITY - ADULT  Goal: Skin Integrity remains intact(Skin Breakdown Prevention)  Description: Assess:  -Perform Tani assessment every 2  -Clean and moisturize skin every incont. Episode   -Inspect skin when repositioning, toileting, and assisting with ADLS  -Assess extremities for adequate circulation and sensation     Bed Management:  -Have minimal linens on bed & keep smooth, unwrinkled  -Change linens as needed when moist or perspiring  -Avoid sitting or lying in one position for more than 2 hours while in bed  -Keep HOB at 30 degrees     Toileting:  -Offer bedside commode  -Assess for incontinence every shift  -Use incontinent care products after each incontinent episode such as alejandra wipes    Activity:  -Mobilize patient 3 times a day  -Encourage activity and walks on unit  -Encourage or provide ROM exercises   -Turn and reposition patient every 2 Hours  -Use appropriate equipment to lift or move patient in bed  -Instruct/ Assist with weight shifting every 60 mins when out of bed in chair  -Consider limitation of chair time 6 hour intervals    Skin Care:  -Avoid use of baby powder, tape, friction and shearing, hot water or constrictive clothing  -Relieve pressure over bony prominences using foam wedges/pillows  -Do not massage red  bony areas    Outcome: Progressing     Problem: MUSCULOSKELETAL - ADULT  Goal: Maintain or return mobility to safest level of function  Description: INTERVENTIONS:  - Assess patient's ability to carry out ADLs; assess patient's baseline for ADL function and identify physical deficits which impact ability to perform ADLs (bathing, care of mouth/teeth, toileting, grooming, dressing, etc.)  - Assess/evaluate cause of self-care deficits   - Assess range of motion  - Assess patient's mobility  - Assess patient's need for assistive devices and provide as appropriate  - Encourage maximum independence but intervene and supervise when necessary  - Involve family in performance of ADLs  - Assess for home care needs following discharge   - Consider OT consult to assist with ADL evaluation and planning for discharge  - Provide patient education as appropriate  Outcome: Progressing     Problem: PAIN - ADULT  Goal: Verbalizes/displays adequate comfort level or baseline comfort level  Description: Interventions:  - Encourage patient to monitor pain and request assistance  - Assess pain using appropriate pain scale  - Administer analgesics based on type and severity of pain and evaluate response  - Implement non-pharmacological measures as appropriate and evaluate response  - Consider cultural and social influences on pain and pain management  - Notify physician/advanced practitioner if interventions unsuccessful or patient reports new pain  Outcome: Progressing     Problem: INFECTION - ADULT  Goal: Absence or prevention of progression during hospitalization  Description: INTERVENTIONS:  - Assess and monitor for signs and symptoms of infection  - Monitor lab/diagnostic results  - Monitor all insertion sites, i.e. indwelling lines, tubes, and drains  - Monitor endotracheal if appropriate and nasal secretions for changes in amount and color  - [place] appropriate cooling/warming therapies per order  - Administer medications as  ordered  - Instruct and encourage patient and family to use good hand hygiene technique  - Identify and instruct in appropriate isolation precautions for identified infection/condition  Outcome: Progressing     Problem: SAFETY ADULT  Goal: Patient will remain free of falls  Description: INTERVENTIONS:  - Educate patient/family on patient safety including physical limitations  - Instruct patient to call for assistance with activity   - Consult OT/PT to assist with strengthening/mobility   - Keep Call bell within reach  - Keep bed low and locked with side rails adjusted as appropriate  - Keep care items and personal belongings within reach  - Initiate and maintain comfort rounds  - Make Fall Risk Sign visible to staff  - Offer Toileting every 2 Hours, in advance of need  - Initiate/Maintain bed/chair alarm  - Obtain necessary fall risk management equipment: non skid socks  - Apply yellow socks and bracelet for high fall risk patients  - Consider moving patient to room near nurses station  Outcome: Progressing  Goal: Maintain or return to baseline ADL function  Description: INTERVENTIONS:  -  Assess patient's ability to carry out ADLs; assess patient's baseline for ADL function and identify physical deficits which impact ability to perform ADLs (bathing, care of mouth/teeth, toileting, grooming, dressing, etc.)  - Assess/evaluate cause of self-care deficits   - Assess range of motion  - Assess patient's mobility; develop plan if impaired  - Assess patient's need for assistive devices and provide as appropriate  - Encourage maximum independence but intervene and supervise when necessary  - Involve family in performance of ADLs  - Assess for home care needs following discharge   - Consider OT consult to assist with ADL evaluation and planning for discharge  - Provide patient education as appropriate  Outcome: Progressing  Goal: Maintains/Returns to pre admission functional level  Description: INTERVENTIONS:  - Perform  AM-PAC 6 Click Basic Mobility/ Daily Activity assessment daily.  - Set and communicate daily mobility goal to care team and patient/family/caregiver.   - Collaborate with rehabilitation services on mobility goals if consulted  - Perform Range of Motion 3 times a day.  - Reposition patient every 2 hours.  - Dangle patient 3 times a day  - Stand patient 3 times a day  - Ambulate patient 3 times a day  - Out of bed to chair 3 times a day   - Out of bed for meals 3 times a day  - Out of bed for toileting  - Record patient progress and toleration of activity level   Outcome: Progressing     Problem: DISCHARGE PLANNING  Goal: Discharge to home or other facility with appropriate resources  Description: INTERVENTIONS:  - Identify barriers to discharge w/patient and caregiver  - Arrange for needed discharge resources and transportation as appropriate  - Identify discharge learning needs (meds, wound care, etc.)  - Arrange for interpretive services to assist at discharge as needed  - Refer to Case Management Department for coordinating discharge planning if the patient needs post-hospital services based on physician/advanced practitioner order or complex needs related to functional status, cognitive ability, or social support system  Outcome: Progressing     Problem: Knowledge Deficit  Goal: Patient/family/caregiver demonstrates understanding of disease process, treatment plan, medications, and discharge instructions  Description: Complete learning assessment and assess knowledge base.  Interventions:  - Provide teaching at level of understanding  - Provide teaching via preferred learning methods  Outcome: Progressing

## 2025-02-09 NOTE — PROGRESS NOTES
Progress Note - Hospitalist   Name: Emiliano Rodriguez 49 y.o. male I MRN: [id number]  Unit/Bed#: 404-01 I Date of Admission: 2/7/2025   Date of Service: 2/9/2025 I Hospital Day: 2    Assessment & Plan  Acute renal failure (ARF) (HCC)  Likely due to prerenal azotemia and rhabdomyolysis, case was reviewed with nephrology due to hyponatremia and concern for overcorrection    Patient received 1.5 L fluid bolus, placed at 150 cc/hour, repeat sodium 130, decreased IV flow rate to 100cc/hr     Na level stable and improving appropriately   NS @ 100cc/hr   Nephro on board  Follow up renal US   Hyponatremia  Resolving   Rhabdomyolysis  CK improving, continue volume expansion  Appreciate nephrology assistance   Acute diverticulitis  Day 3 Ceftriaxone Flagyl   Follow up stool studies  See Treatment plan for diarrhea     Diarrhea  Patient with severe diarrhea , will utilize rectal tube   Start banana flakes and Probiotics  C.diff Negative  Enteric Panel negative     Abnormal LFTs  Patient with hyperbilirbuinemia and elevated LFT in the setting of rhabdomyolysis and ETOH use    Likely due to alcohol use and FLD   Follow up  RUQ US with dopplers  Check acute and chronic hep panel  Avoid Hepatotoxins (lipitor / Tylenol)  GI consult   Gastroesophageal reflux disease without esophagitis  Protonix 40 mg daily  GI consult appreciated   Outpatient EGD  Alcohol abuse  Endorses at least 5 beers a day, although I suspect this is understated  Folic acid, thiamine  Initiate CIWA protocol    VTE Pharmacologic Prophylaxis:   Moderate Risk (Score 3-4) - Pharmacological DVT Prophylaxis Ordered: heparin.    Mobility:   Basic Mobility Inpatient Raw Score: 18  JH-HLM Goal: 6: Walk 10 steps or more  JH-HLM Achieved: 2: Bed activities/Dependent transfer  JH-HLM Goal achieved. Continue to encourage appropriate mobility.    Patient Centered Rounds: I performed bedside rounds with nursing staff today.   Discussions with Specialists or Other Care Team  Provider:     Education and Discussions with Family / Patient:     Current Length of Stay: 2 day(s)  Current Patient Status: Inpatient   Certification Statement: The patient will continue to require additional inpatient hospital stay due to rhabdo / danielle  Discharge Plan: TBD    Code Status: Level 1 - Full Code    Subjective   Seen and examined , reports less pain and improving strength in lower extremities  No acute events overnight     Objective :  Temp:  [97.3 °F (36.3 °C)-98.3 °F (36.8 °C)] 97.3 °F (36.3 °C)  HR:  [79-86] 83  BP: (109-129)/(75-91) 109/75  Resp:  [18-20] 20  SpO2:  [95 %-97 %] 95 %  O2 Device: None (Room air)    Body mass index is 32.35 kg/m².     Input and Output Summary (last 24 hours):     Intake/Output Summary (Last 24 hours) at 2/9/2025 1135  Last data filed at 2/9/2025 0900  Gross per 24 hour   Intake 1580 ml   Output 1600 ml   Net -20 ml       Physical Exam  Vitals reviewed.   Constitutional:       General: He is not in acute distress.     Appearance: He is ill-appearing.   Eyes:      General: Scleral icterus present.   Cardiovascular:      Rate and Rhythm: Normal rate and regular rhythm.      Pulses: Normal pulses.      Heart sounds: No murmur heard.  Pulmonary:      Effort: Pulmonary effort is normal. No respiratory distress.      Breath sounds: No wheezing or rales.   Abdominal:      General: Abdomen is flat. Bowel sounds are normal. There is no distension.      Tenderness: There is no abdominal tenderness. There is no guarding.   Musculoskeletal:      Right lower leg: No edema.      Left lower leg: No edema.   Skin:     Capillary Refill: Capillary refill takes 2 to 3 seconds.   Neurological:      General: No focal deficit present.      Mental Status: He is alert and oriented to person, place, and time. Mental status is at baseline.           Lines/Drains:  Lines/Drains/Airways       Active Status       Name Placement date Placement time Site Days    Rectal Tube 02/08/25  1018  --  1                             Lab Results: I have reviewed the following results:   Results from last 7 days   Lab Units 02/09/25  0546   WBC Thousand/uL 8.39   HEMOGLOBIN g/dL 10.6*   HEMATOCRIT % 33.2*   PLATELETS Thousands/uL 118*   SEGS PCT % 80*   LYMPHO PCT % 9*   MONO PCT % 8   EOS PCT % 1     Results from last 7 days   Lab Units 02/09/25  0546   SODIUM mmol/L 135   POTASSIUM mmol/L 4.0   CHLORIDE mmol/L 110*   CO2 mmol/L 15*   BUN mg/dL 29*   CREATININE mg/dL 4.66*   ANION GAP mmol/L 10   CALCIUM mg/dL 7.6*   ALBUMIN g/dL 2.1*   TOTAL BILIRUBIN mg/dL 3.25*   ALK PHOS U/L 121*   ALT U/L 334*   AST U/L 1,012*   GLUCOSE RANDOM mg/dL 70                 Results from last 7 days   Lab Units 02/07/25  0453   LACTIC ACID mmol/L 1.3   PROCALCITONIN ng/ml 3.09*       Recent Cultures (last 7 days):   Results from last 7 days   Lab Units 02/07/25  0906   C DIFF TOXIN B BY PCR  Negative       Imaging Results Review: I reviewed radiology reports from this admission including: Ultrasound(s).  Other Study Results Review: No additional pertinent studies reviewed.    Last 24 Hours Medication List:     Current Facility-Administered Medications:     acetaminophen (TYLENOL) tablet 650 mg, Q6H PRN    cefTRIAXone (ROCEPHIN) IVPB (premix in dextrose) 1,000 mg 50 mL, Q24H, Last Rate: 1,000 mg (02/09/25 0826)    clopidogrel (PLAVIX) tablet 75 mg, Daily    folic acid (FOLVITE) tablet 1 mg, Daily    heparin (porcine) subcutaneous injection 5,000 Units, Q8H DAVION **AND** [COMPLETED] Platelet count, Once    metoprolol succinate (TOPROL-XL) 24 hr tablet 25 mg, Daily    metroNIDAZOLE (FLAGYL) tablet 500 mg, Q8H DAVION    nicotine (NICODERM CQ) 21 mg/24 hr TD 24 hr patch 1 patch, Daily    pantoprazole (PROTONIX) EC tablet 40 mg, Early Morning    saccharomyces boulardii (FLORASTOR) capsule 250 mg, BID    sodium bicarbonate 150 mEq in dextrose 5 % 1,000 mL infusion, Continuous, Last Rate: 75 mL/hr (02/09/25 1128)    sodium chloride 0.9 % infusion,  Continuous, Last Rate: 150 mL/hr (02/09/25 0135)    thiamine tablet 100 mg, Daily    Administrative Statements   Today, Patient Was Seen By: Ankita Katz MD      **Please Note: This note may have been constructed using a voice recognition system.**

## 2025-02-09 NOTE — ASSESSMENT & PLAN NOTE
Sodium level now up to 135 mmol/L from 132 mmol/L yesterday, continue with isotonic saline, will likely start to reduce as the patient's volume status continues to improve.

## 2025-02-09 NOTE — ASSESSMENT & PLAN NOTE
Continue supportive care at this time, repeat urine studies confirm ATN.  Maintain appropriate volume and nutritional status.

## 2025-02-09 NOTE — PROGRESS NOTES
Progress Note - Nephrology   Name: Emiliano Rodriguez 49 y.o. male I MRN: [id number]  Unit/Bed#: 404-01 I Date of Admission: 2/7/2025   Date of Service: 2/9/2025 I Hospital Day: 2   Administrative Statements   VIRTUAL CARE DOCUMENTATION:     1. This service was provided via Telemedicine using [employer] Kit     2. Parties in the room with patient during teleconsult Patient only    3. Confidentiality My office door was closed     4. Participants No one else was in the room    5. Patient acknowledged consent and understanding of privacy and security of the  Telemedicine consult. I informed the patient that I have reviewed their record in Epic and presented the opportunity for them to ask any questions regarding the visit today.  The patient agreed to participate.    6. I have spent a total time of 20 minutes in caring for this patient on the day of the visit/encounter including Impressions, Documenting in the medical record, Reviewing / ordering tests, medicine, procedures  , and Communicating with other healthcare professionals , not including the time spent for establishing the audio/video connection.         Assessment & Plan  Acute renal failure (ARF) (HCC)  Continue supportive care, as noted yesterday, patient most likely acute tubular necrosis but has not been keeping up with his fluids with probable substantial fluid losses still ongoing.  Continue to optimize care and avoid potential for toxins.  Recommended to maintain IV fluids for now and initiate bicarbonate drip, please refer below for details.    Acute tubular necrosis (HCC)  Continue supportive care at this time, repeat urine studies confirm ATN.  Maintain appropriate volume and nutritional status.  Hyponatremia  Sodium level now up to 135 mmol/L from 132 mmol/L yesterday, continue with isotonic saline, will likely start to reduce as the patient's volume status continues to improve.  Hypokalemia  Potassium level now up to 4 mmol/L, monitor and replete as  "indicated.  Volume depletion  Has significantly improved, we will look to reduce IV fluid rate once the patient's CKs show improvement  Diarrhea  Continue with current management according to our hospitalist colleagues.  Rhabdomyolysis  Was improving, however most recent CK noted to have worsened now at greater than 20,000.  Continue with IV fluids as noted above.  Will also check a serum phosphorus, replete phosphorus and magnesium levels aggressively.  Gastroesophageal reflux disease without esophagitis  Continue with PPI, potential endoscopy going forward  Alcohol abuse  Continue [facility] protocol, further care and management according to hospitalist recommendations.  Abnormal LFTs    Acute diverticulitis  Day 2 of ceftriaxone and metronidazole    Case discussed with hospitalist.  Add bicarbonate drip at 75 mL/hour, continue with normal saline at current rate of 150 mL/hour.    Follow up reason for today's visit: Acute kidney injury/acute tubular necrosis/rhabdomyolysis    <principal problem not specified>    Patient Active Problem List   Diagnosis    CVA (cerebral vascular accident) (HCC)    Gastroesophageal reflux disease without esophagitis    TIA (transient ischemic attack)    Alcohol abuse    Tobacco abuse    Acute renal failure (ARF) (HCC)    Hyponatremia    Rhabdomyolysis    Abnormal LFTs    Acute diverticulitis    Diarrhea    Steatosis of liver    Hepatomegaly    Hypokalemia    Volume depletion    Acute tubular necrosis (HCC)         Subjective:   Continues to experience loose bowel movements    Objective:     Vitals: Blood pressure 109/75, pulse 83, temperature (!) 97.3 °F (36.3 °C), resp. rate 20, height 5' 6\" (1.676 m), weight 90.9 kg (200 lb 6.4 oz), SpO2 95%.,Body mass index is 32.35 kg/m².    Weight (last 2 days)       Date/Time Weight    02/07/25 14:33:52 90.9 (200.4)    02/07/25 0425 90.3 (199.08)              Intake/Output Summary (Last 24 hours) at 2/9/2025 1113  Last data filed at 2/9/2025 " "0900  Gross per 24 hour   Intake 1580 ml   Output 1600 ml   Net -20 ml     I/O last 3 completed shifts:  In: 1580 [P.O.:600; I.V.:980]  Out: 2100 [Urine:1750; Stool:350]         Physical Exam: /75   Pulse 83   Temp (!) 97.3 °F (36.3 °C)   Resp 20   Ht 5' 6\" (1.676 m)   Wt 90.9 kg (200 lb 6.4 oz)   SpO2 95%   BMI 32.35 kg/m²     General Appearance:    Alert, cooperative, no distress, appears stated age   Head:    Normocephalic, without obvious abnormality, atraumatic   Eyes:    Conjunctiva/corneas clear   Ears:    Normal external ears   Nose:   Nares normal, septum midline, mucosa normal, no drainage    or sinus tenderness   Throat:   Lips, mucosa, and tongue normal; teeth and gums normal   Neck:   Supple   Back:     Symmetric, no curvature, ROM normal, no CVA tenderness   Lungs:     Clear to auscultation bilaterally, respirations unlabored   Chest wall:    No tenderness or deformity   Heart:    Regular rate and rhythm, S1 and S2 normal, no murmur, rub   or gallop   Abdomen:     Soft, non-tender, bowel sounds active   Extremities:   Extremities normal, atraumatic, no cyanosis or edema   Skin:   Skin color, texture, turgor normal, no rashes or lesions   Lymph nodes:   Cervical normal   Neurologic:   CNII-XII intact            Lab, Imaging and other studies: I have personally reviewed pertinent labs.  CBC:   Lab Results   Component Value Date    WBC 8.39 02/09/2025    HGB 10.6 (L) 02/09/2025    HCT 33.2 (L) 02/09/2025     (H) 02/09/2025     (L) 02/09/2025    RBC 3.18 (L) 02/09/2025    MCH 33.3 02/09/2025    MCHC 31.9 02/09/2025    RDW 15.5 (H) 02/09/2025    MPV 11.9 02/09/2025    NRBC 0 02/09/2025     CMP:   Lab Results   Component Value Date    K 4.0 02/09/2025     (H) 02/09/2025    CO2 15 (L) 02/09/2025    BUN 29 (H) 02/09/2025    CREATININE 4.66 (H) 02/09/2025    CALCIUM 7.6 (L) 02/09/2025    AST 1,012 (H) 02/09/2025     (H) 02/09/2025    ALKPHOS 121 (H) 02/09/2025    EGFR 13 " "02/09/2025       .  Results from last 7 days   Lab Units 02/09/25  0546 02/08/25  0903 02/08/25  0437 02/07/25  0813 02/07/25  0453   POTASSIUM mmol/L 4.0 3.6 3.2*  3.3*   < > 3.5   CHLORIDE mmol/L 110* 104 103  104   < > 98   CO2 mmol/L 15* 18* 20*  19*   < > 20*   BUN mg/dL 29* 25 24  24   < > 18   CREATININE mg/dL 4.66* 4.30* 4.06*  4.06*   < > 3.07*   CALCIUM mg/dL 7.6* 8.2* 8.1*  8.0*   < > 9.2   ALK PHOS U/L 121*  --  139*  --  177*   ALT U/L 334*  --  288*  --  313*   AST U/L 1,012*  --  913*  --  866*    < > = values in this interval not displayed.         Phosphorus: No results found for: \"PHOS\"  Magnesium:   Lab Results   Component Value Date    MG 1.6 (L) 02/09/2025     Urinalysis: No results found for: \"COLORU\", \"CLARITYU\", \"SPECGRAV\", \"PHUR\", \"LEUKOCYTESUR\", \"NITRITE\", \"PROTEINUA\", \"GLUCOSEU\", \"KETONESU\", \"BILIRUBINUR\", \"BLOODU\"  Ionized Calcium: No results found for: \"CAION\"  Coagulation: No results found for: \"PT\", \"INR\", \"APTT\"  Troponin: No results found for: \"TROPONINI\"  ABG: No results found for: \"PHART\", \"IPT2CSB\", \"PO2ART\", \"DZR6RIH\", \"M0VIMXSI\", \"BEART\", \"SOURCE\"  Radiology review:     IMAGING  Procedure: US kidney and bladder with pvr  Result Date: 2/8/2025  Narrative: RENAL ULTRASOUND WITH PVR INDICATION: GUNJAN and 1.6cm cyst. COMPARISON: None TECHNIQUE: Ultrasound of the retroperitoneum was performed with a curvilinear transducer utilizing volumetric sweeps and still imaging techniques. FINDINGS: KIDNEYS: Symmetric and normal size. Right kidney: 10.4 x 4.9 x 5.4 cm. Volume 142.2 mL Left kidney: 11.6 x 5.6 x 5.5 cm. Volume 185.2 mL Right kidney Limited visualization due to sound beam attenuation by the liver. Normal echogenicity and contour. No gross mass is identified. Known lower pole cyst is not well visualized. No hydronephrosis. No shadowing calculi. No perinephric fluid collections. Left kidney Normal echogenicity and contour. No mass is identified. No hydronephrosis. No shadowing " - - - calculi. No perinephric fluid collections. URETERS: Nonvisualized. BLADDER: Normally distended. No focal thickening or mass lesions. The right ureteral jet is visualized. Prevoid: 124.5 No significant post void volume. Measured post void volume in mL: 20.3     Impression: Technically limited study. Left lower pole cyst not well visualized. No hydronephrosis. 20 mL post void residual in the bladder. Workstation performed: [id number]     Procedure: US right upper quadrant with liver dopplers  Result Date: 2/8/2025  Narrative: RIGHT UPPER QUADRANT ULTRASOUND WITH LIVER DOPPLER INDICATION: cirrhosis, bili. COMPARISON: None. TECHNIQUE: Real-time ultrasound of the right upper quadrant was performed with a curvilinear transducer with both volumetric sweeps and still imaging techniques. FINDINGS: PANCREAS: Visualized portions of the pancreas are within normal limits. AORTA AND IVC: Visualized portions are normal for patient age. LIVER: Size: Enlarged. The liver measures 20.7 cm in the midclavicular line. Contour: Undulating Parenchyma: Echogenic No liver mass identified. LIVER DOPPLER: The main portal vein and primary branch segments are patent and hepatopetal with normal spectral waveform. Hepatic veins are patent. Spectral waveforms within normal limits. Main hepatic artery appears normal size, patent with normal spectral waveform. BILIARY: No gallbladder findings. No intrahepatic biliary dilatation. CBD measures 2.0 mm. No choledocholithiasis. KIDNEY: Right kidney measures 10.4 x 4.9 x 5.4 cm. Volume 144.2 mL See a separately dictated renal ultrasound report. ASCITES: None.     Impression: Hepatomegaly. Steatosis, also demonstrated on the recent CT. No biliary dilatation. Patent major hepatic vessels with normodirectional flow. Workstation performed: [id number]     Procedure: XR chest 1 view portable  Result Date: 2/7/2025  Narrative: XR CHEST PORTABLE INDICATION: multiple recent falls. COMPARISON: 12/31/2018 FINDINGS:  Monitoring leads and clips project over the chest. Clear lungs. No pneumothorax or pleural effusion. Normal cardiomediastinal silhouette. Bones are unremarkable for age. Normal upper abdomen.     Impression: No acute cardiopulmonary disease. Workstation performed: [id number]     Procedure: CT abdomen pelvis wo contrast  Result Date: 2/7/2025  Narrative: CT ABDOMEN AND PELVIS WITHOUT IV CONTRAST INDICATION: Lower back pain, recent diverticulitis, thigh weakness, r/o mass effect on psoas. COMPARISON: None. TECHNIQUE: CT examination of the abdomen and pelvis was performed without intravenous contrast. Multiplanar 2D reformatted images were created from the source data. This examination, like all CT scans performed in the [facility], was performed utilizing techniques to minimize radiation dose exposure, including the use of iterative reconstruction and automated exposure control. Radiation dose length product (DLP) for this visit: 728 mGy-cm Enteric Contrast: Not administered. FINDINGS: Absence of intravenous contrast enhancement limits evaluation of the abdominal viscera. ABDOMEN LOWER CHEST: No clinically significant abnormality in the visualized lower chest. LIVER/BILIARY TREE: Enlarged liver with diffuse markedly decreased parenchymal density. No suspicious mass. Normal hepatic contours. No biliary dilation. GALLBLADDER: No calcified gallstones. No pericholecystic inflammatory change. SPLEEN: Enlarged spleen measuring 15 cm. Tiny calcified granulomas noted. PANCREAS: Unremarkable. ADRENAL GLANDS: Unremarkable. KIDNEYS/URETERS: Partially exophytic 1.6 cm cyst with density of 34 Hounsfield units at the lower pole of the left kidney. 5 mm right upper pole calculus. No hydronephrosis. STOMACH AND BOWEL: Colonic diverticulosis, with thickening of the mid sigmoid and mild pericolic inflammatory stranding consistent with acute diverticulitis. Inflamed sigmoid abuts the dome of the bladder, with thickening  of the bladder wall in this region. No intestinal obstruction. APPENDIX: No findings to suggest appendicitis. ABDOMINOPELVIC CAVITY: No ascites. No pneumoperitoneum. No lymphadenopathy. VESSELS: Unremarkable for patient's age. PELVIS REPRODUCTIVE ORGANS: Unremarkable for patient's age. URINARY BLADDER: Wall thickening at the dome of the bladder underlying inflamed sigmoid, as above. ABDOMINAL WALL/INGUINAL REGIONS: Small fat-containing umbilical hernia. BONES: No acute fracture or suspicious osseous lesion.     Impression: Acute sigmoid diverticulitis. Inflamed sigmoid abuts the dome of the urinary bladder, with associated bladder wall thickening raising the question of developing fistula. Hepatosplenomegaly with hepatic steatosis. Indeterminate 1.6 cm left renal cyst. Recommend follow-up ultrasound. Nonobstructing right renal calculus. The study was marked in EPIC for immediate notification. Workstation performed: [id number]     Procedure: CT head wo contrast  Result Date: 2/7/2025  Narrative: CT BRAIN - WITHOUT CONTRAST INDICATION:   Multiple recent falls unknown HS. COMPARISON: CT 1/1/2019 MRI 1/2/2019. TECHNIQUE:  CT examination of the brain was performed.  Multiplanar 2D reformatted images were created from the source data. Radiation dose length product (DLP) for this visit:  856 mGy-cm .  This examination, like all CT scans performed in the [facility], was performed utilizing techniques to minimize radiation dose exposure, including the use of iterative reconstruction and automated exposure control. IMAGE QUALITY:  Diagnostic. FINDINGS: PARENCHYMA: Decreased attenuation is noted in periventricular and subcortical white matter demonstrating an appearance that is statistically most likely to represent mild microangiopathic change. Old bilateral basal ganglia lacunar infarcts. No CT signs of acute infarction.  No intracranial mass, mass effect or midline shift.  No acute parenchymal hemorrhage.  VENTRICLES AND EXTRA-AXIAL SPACES: Mildly increased prominence of the lateral ventricles since the prior study. No extra-axial collections. VISUALIZED ORBITS: Normal visualized orbits. PARANASAL SINUSES: Normal visualized paranasal sinuses. CALVARIUM AND EXTRACRANIAL SOFT TISSUES: Normal.     Impression: No acute intracranial abnormality. Mildly increased ventricular prominence since the prior studies raising the question of normal pressure hydrocephalus. Workstation performed: [id number]         Current Facility-Administered Medications:     acetaminophen (TYLENOL) tablet 650 mg, Q6H PRN    cefTRIAXone (ROCEPHIN) IVPB (premix in dextrose) 1,000 mg 50 mL, Q24H, Last Rate: 1,000 mg (02/09/25 0826)    clopidogrel (PLAVIX) tablet 75 mg, Daily    folic acid (FOLVITE) tablet 1 mg, Daily    heparin (porcine) subcutaneous injection 5,000 Units, Q8H DAVION **AND** [COMPLETED] Platelet count, Once    metoprolol succinate (TOPROL-XL) 24 hr tablet 25 mg, Daily    metroNIDAZOLE (FLAGYL) tablet 500 mg, Q8H DAVION    nicotine (NICODERM CQ) 21 mg/24 hr TD 24 hr patch 1 patch, Daily    pantoprazole (PROTONIX) EC tablet 40 mg, Early Morning    saccharomyces boulardii (FLORASTOR) capsule 250 mg, BID    sodium bicarbonate 150 mEq in dextrose 5 % 1,000 mL infusion, Continuous    sodium chloride 0.9 % infusion, Continuous, Last Rate: 150 mL/hr (02/09/25 0135)    thiamine tablet 100 mg, Daily  Medications Discontinued During This Encounter   Medication Reason    thiamine (VITAMIN B1) 100 mg in sodium chloride 0.9 % 50 mL IVPB     folic acid 1 mg in sodium chloride 0.9 % 50 mL IVPB     sodium chloride 0.9 % infusion     multi-electrolyte (PLASMALYTE-A/ISOLYTE-S PH 7.4) IV solution     vancomycin (VANCOCIN) capsule 125 mg     sodium chloride 0.9 % infusion     acetaminophen (TYLENOL) 325 mg tablet **ADS Override Pull** Returned to LENNOX Guadarrama, DO      This progress note was produced in part using a dictation device which may  document imprecise wording from author's original intent.       - - -

## 2025-02-09 NOTE — ASSESSMENT & PLAN NOTE
Continue supportive care, as noted yesterday, patient most likely acute tubular necrosis but has not been keeping up with his fluids with probable substantial fluid losses still ongoing.  Continue to optimize care and avoid potential for toxins.  Recommended to maintain IV fluids for now and initiate bicarbonate drip, please refer below for details.

## 2025-02-09 NOTE — ASSESSMENT & PLAN NOTE
Has significantly improved, we will look to reduce IV fluid rate once the patient's CKs show improvement

## 2025-02-09 NOTE — ASSESSMENT & PLAN NOTE
Was improving, however most recent CK noted to have worsened now at greater than 20,000.  Continue with IV fluids as noted above.  Will also check a serum phosphorus, replete phosphorus and magnesium levels aggressively.

## 2025-02-09 NOTE — ASSESSMENT & PLAN NOTE
Patient with severe diarrhea , will utilize rectal tube   Start banana flakes and Probiotics  C.diff Negative  Enteric Panel negative

## 2025-02-10 LAB
ACTIN IGG SERPL-ACNC: 3 UNITS (ref 0–19)
ALBUMIN SERPL BCG-MCNC: 2 G/DL (ref 3.5–5)
ALP SERPL-CCNC: 113 U/L (ref 34–104)
ALT SERPL W P-5'-P-CCNC: 335 U/L (ref 7–52)
ANION GAP SERPL CALCULATED.3IONS-SCNC: 11 MMOL/L (ref 4–13)
AST SERPL W P-5'-P-CCNC: 880 U/L (ref 13–39)
BASOPHILS # BLD AUTO: 0.05 THOUSANDS/ΜL (ref 0–0.1)
BASOPHILS NFR BLD AUTO: 1 % (ref 0–1)
BILIRUB SERPL-MCNC: 2.73 MG/DL (ref 0.2–1)
BUN SERPL-MCNC: 34 MG/DL (ref 5–25)
CALCIUM ALBUM COR SERPL-MCNC: 8.8 MG/DL (ref 8.3–10.1)
CALCIUM SERPL-MCNC: 7.2 MG/DL (ref 8.4–10.2)
CALPROTECTIN STL-MCNC: 380 ΜG/G
CHLORIDE SERPL-SCNC: 107 MMOL/L (ref 96–108)
CK SERPL-CCNC: ABNORMAL U/L (ref 39–308)
CO2 SERPL-SCNC: 16 MMOL/L (ref 21–32)
CREAT SERPL-MCNC: 4.78 MG/DL (ref 0.6–1.3)
ELASTASE PANC STL-MCNT: 512 UG/G
EOSINOPHIL # BLD AUTO: 0.1 THOUSAND/ΜL (ref 0–0.61)
EOSINOPHIL NFR BLD AUTO: 1 % (ref 0–6)
ERYTHROCYTE [DISTWIDTH] IN BLOOD BY AUTOMATED COUNT: 15.2 % (ref 11.6–15.1)
GFR SERPL CREATININE-BSD FRML MDRD: 13 ML/MIN/1.73SQ M
GLUCOSE SERPL-MCNC: 71 MG/DL (ref 65–140)
HCT VFR BLD AUTO: 31.4 % (ref 36.5–49.3)
HGB BLD-MCNC: 10 G/DL (ref 12–17)
IMM GRANULOCYTES # BLD AUTO: 0.07 THOUSAND/UL (ref 0–0.2)
IMM GRANULOCYTES NFR BLD AUTO: 1 % (ref 0–2)
LYMPHOCYTES # BLD AUTO: 0.8 THOUSANDS/ΜL (ref 0.6–4.47)
LYMPHOCYTES NFR BLD AUTO: 8 % (ref 14–44)
MCH RBC QN AUTO: 33.4 PG (ref 26.8–34.3)
MCHC RBC AUTO-ENTMCNC: 31.8 G/DL (ref 31.4–37.4)
MCV RBC AUTO: 105 FL (ref 82–98)
MITOCHONDRIA M2 IGG SER-ACNC: <20 UNITS (ref 0–20)
MONOCYTES # BLD AUTO: 0.79 THOUSAND/ΜL (ref 0.17–1.22)
MONOCYTES NFR BLD AUTO: 8 % (ref 4–12)
NEUTROPHILS # BLD AUTO: 7.75 THOUSANDS/ΜL (ref 1.85–7.62)
NEUTS SEG NFR BLD AUTO: 81 % (ref 43–75)
NRBC BLD AUTO-RTO: 0 /100 WBCS
PLATELET # BLD AUTO: 119 THOUSANDS/UL (ref 149–390)
PMV BLD AUTO: 12 FL (ref 8.9–12.7)
POTASSIUM SERPL-SCNC: 3.4 MMOL/L (ref 3.5–5.3)
PROT SERPL-MCNC: 4.9 G/DL (ref 6.4–8.4)
RBC # BLD AUTO: 2.99 MILLION/UL (ref 3.88–5.62)
SODIUM SERPL-SCNC: 134 MMOL/L (ref 135–147)
WBC # BLD AUTO: 9.56 THOUSAND/UL (ref 4.31–10.16)

## 2025-02-10 PROCEDURE — 82550 ASSAY OF CK (CPK): CPT | Performed by: FAMILY MEDICINE

## 2025-02-10 PROCEDURE — 97167 OT EVAL HIGH COMPLEX 60 MIN: CPT

## 2025-02-10 PROCEDURE — 82607 VITAMIN B-12: CPT | Performed by: INTERNAL MEDICINE

## 2025-02-10 PROCEDURE — 99232 SBSQ HOSP IP/OBS MODERATE 35: CPT | Performed by: INTERNAL MEDICINE

## 2025-02-10 PROCEDURE — 99233 SBSQ HOSP IP/OBS HIGH 50: CPT

## 2025-02-10 PROCEDURE — 80053 COMPREHEN METABOLIC PANEL: CPT | Performed by: FAMILY MEDICINE

## 2025-02-10 PROCEDURE — 97163 PT EVAL HIGH COMPLEX 45 MIN: CPT

## 2025-02-10 PROCEDURE — 85025 COMPLETE CBC W/AUTO DIFF WBC: CPT | Performed by: FAMILY MEDICINE

## 2025-02-10 RX ORDER — TRAZODONE HYDROCHLORIDE 50 MG/1
50 TABLET ORAL
Status: DISCONTINUED | OUTPATIENT
Start: 2025-02-10 | End: 2025-02-19 | Stop reason: HOSPADM

## 2025-02-10 RX ORDER — POTASSIUM CHLORIDE 1500 MG/1
40 TABLET, EXTENDED RELEASE ORAL 2 TIMES DAILY
Status: DISCONTINUED | OUTPATIENT
Start: 2025-02-10 | End: 2025-02-11

## 2025-02-10 RX ADMIN — CLOPIDOGREL 75 MG: 75 TABLET ORAL at 08:21

## 2025-02-10 RX ADMIN — Medication 250 MG: at 08:21

## 2025-02-10 RX ADMIN — ACETAMINOPHEN 650 MG: 325 TABLET, FILM COATED ORAL at 11:26

## 2025-02-10 RX ADMIN — SODIUM CHLORIDE 150 ML/HR: 0.9 INJECTION, SOLUTION INTRAVENOUS at 02:50

## 2025-02-10 RX ADMIN — SODIUM BICARBONATE 75 ML/HR: 84 INJECTION, SOLUTION INTRAVENOUS at 17:32

## 2025-02-10 RX ADMIN — Medication 250 MG: at 17:31

## 2025-02-10 RX ADMIN — METRONIDAZOLE 500 MG: 500 TABLET ORAL at 22:11

## 2025-02-10 RX ADMIN — HEPARIN SODIUM 5000 UNITS: 5000 INJECTION, SOLUTION INTRAVENOUS; SUBCUTANEOUS at 14:16

## 2025-02-10 RX ADMIN — METRONIDAZOLE 500 MG: 500 TABLET ORAL at 14:16

## 2025-02-10 RX ADMIN — THIAMINE HCL TAB 100 MG 100 MG: 100 TAB at 08:21

## 2025-02-10 RX ADMIN — CEFTRIAXONE 1000 MG: 1 INJECTION, SOLUTION INTRAVENOUS at 08:21

## 2025-02-10 RX ADMIN — POTASSIUM CHLORIDE 40 MEQ: 1500 TABLET, EXTENDED RELEASE ORAL at 17:32

## 2025-02-10 RX ADMIN — PANTOPRAZOLE SODIUM 40 MG: 40 TABLET, DELAYED RELEASE ORAL at 05:22

## 2025-02-10 RX ADMIN — TRAZODONE HYDROCHLORIDE 50 MG: 50 TABLET ORAL at 22:11

## 2025-02-10 RX ADMIN — FOLIC ACID 1 MG: 1 TABLET ORAL at 08:21

## 2025-02-10 RX ADMIN — SODIUM CHLORIDE 150 ML/HR: 0.9 INJECTION, SOLUTION INTRAVENOUS at 10:12

## 2025-02-10 RX ADMIN — SODIUM BICARBONATE 75 ML/HR: 84 INJECTION, SOLUTION INTRAVENOUS at 00:57

## 2025-02-10 RX ADMIN — METRONIDAZOLE 500 MG: 500 TABLET ORAL at 05:22

## 2025-02-10 RX ADMIN — SODIUM CHLORIDE 100 ML/HR: 0.9 INJECTION, SOLUTION INTRAVENOUS at 17:35

## 2025-02-10 RX ADMIN — METOPROLOL SUCCINATE 25 MG: 25 TABLET, EXTENDED RELEASE ORAL at 08:21

## 2025-02-10 RX ADMIN — NICOTINE 1 PATCH: 21 PATCH, EXTENDED RELEASE TRANSDERMAL at 08:20

## 2025-02-10 NOTE — PLAN OF CARE
Problem: OCCUPATIONAL THERAPY ADULT  Goal: Performs self-care activities at highest level of function for planned discharge setting.  See evaluation for individualized goals.  Description: Treatment Interventions: ADL retraining, Functional transfer training, UE strengthening/ROM, Endurance training, Patient/family training, Equipment evaluation/education, Activityengagement          See flowsheet documentation for full assessment, interventions and recommendations.   Note: Limitation: Decreased ADL status, Decreased UE strength, Decreased Safe judgement during ADL, Decreased endurance, Decreased self-care trans, Decreased high-level ADLs     Assessment: Pt is a 49 y.o. male seen for OT evaluation s/p admit to [facility] on 2/7/2025 w/ <principal problem not specified>.  Comorbidities affecting pt's functional performance at time of assessment include:  GERD, alcohol abuse, acute renal failure, rhabdomyolysis, diarrhea, acute diverticulitis, hepatomegaly, CVA . Personal factors affecting pt at time of IE include:steps to enter environment, limited home support, difficulty performing ADLS, difficulty performing IADLS , limited insight into deficits, compliance, decreased initiation and engagement , and health management . Prior to admission, pt was (I) with ADLs with no AD. Upon evaluation: Pt requires min- max (A) x1-2  2* the following deficits impacting occupational performance: weakness, decreased strength, decreased balance, decreased tolerance, impaired initiation, impulsivity, decreased safety awareness, and abnormal tone. Pt to benefit from continued skilled OT tx while in the hospital to address deficits as defined above and maximize level of functional independence w ADL's and functional mobility. Occupational Performance areas to address include: grooming, bathing/shower, toilet hygiene, dressing, functional mobility, community mobility, and clothing management. The patient's raw score on the AM-PAC Daily  Activity Inpatient Short Form is 16. A raw score of less than 19 suggests the patient may benefit from discharge to post-acute rehabilitation services. Discharge recommendation at this time is level I maximum resource intensity.  Pt benefited from co-evaluation of skilled OT and PT therapists in order to most appropriately address functional deficits d/t extensive assistance required for safe functional mobility, decreased activity tolerance, and regression from functioning level prior to admission and/or onset of present illness. OT/PT objectives were addressed separately; please see PT note for specific goal areas targeted.     Rehab Resource Intensity Level, OT: I (Maximum Resource Intensity)

## 2025-02-10 NOTE — OCCUPATIONAL THERAPY NOTE
"    Occupational Therapy Evaluation     Patient Name: Emiliano Rodriguez  Today's Date: 2/10/2025  Problem List  Active Problems:    Gastroesophageal reflux disease without esophagitis    Alcohol abuse    Acute renal failure (ARF) (HCC)    Hyponatremia    Rhabdomyolysis    Abnormal LFTs    Acute diverticulitis    Diarrhea    Hypokalemia    Volume depletion    Acute tubular necrosis (HCC)    Past Medical History  Past Medical History:   Diagnosis Date    GERD (gastroesophageal reflux disease)     TIA (transient ischemic attack)      Past Surgical History  History reviewed. No pertinent surgical history.          02/10/25 1334   OT Last Visit   OT Visit Date 02/10/25   Note Type   Note type Evaluation   Pain Assessment   Pain Assessment Tool 0-10   Pain Score No Pain   Restrictions/Precautions   Weight Bearing Precautions Per Order No   Other Precautions Impulsive;Chair Alarm;Bed Alarm;Fall Risk;Multiple lines  (rectal tube)   Home Living   Type of Home House   Home Layout Multi-level;Performs ADLs on one level;Able to live on main level with bedroom/bathroom;Stairs to enter with rails  (6STE c HR)   Bathroom Shower/Tub Walk-in shower   Bathroom Toilet Standard   Bathroom Equipment Grab bars in shower   Bathroom Accessibility Accessible   Home Equipment Walker   Additional Comments pt looking to obtain shower  chair for home   Prior Function   Level of [place] Independent with ADLs;Independent with functional mobility;Needs assistance with IADLS   Lives With Friend(s)   Receives Help From Friend(s)   IADLs Family/Friend/Other provides transportation;Family/Friend/Other provides meals   Falls in the last 6 months 1 to 4   Vocational Unemployed   Subjective   Subjective \" I'm just really weak\"   ADL   Eating Assistance 5  Supervision/Setup   Grooming Assistance 5  Supervision/Setup   UB Bathing Assistance 4  Minimal Assistance   LB Bathing Assistance 3  Moderate Assistance   UB Dressing Assistance 4  Minimal " Assistance   LB Dressing Assistance 3  Moderate Assistance   Toileting Assistance  2  Maximal Assistance   Toileting Deficit Other (Comment)  (pt with rectal tube; use of urinal to void)   Additional Comments Given fxl performance skills + medical complexity, therapist suspecting via clinical judgement + skilled analysis; pt currently requires stated assist above to perform each area of ADL d/t limitations including: generalized muscle weakness, sitting/standing balance deficits, fxl activity tolerance limitations, difficulty performing bend/reach-anterior trunk flexion, requires external assist to complete transitional movements, decreased core strength, decreased postural control, instability in stance, cognitive deficits, safety awareness concerns, and decreased problem solving abilities.   Bed Mobility   Rolling L 3  Moderate assistance   Additional items Assist x 2;Bedrails;Increased time required;LE management;Verbal cues   Supine to Sit 3  Moderate assistance   Additional items Assist x 2;Bedrails;Increased time required;Verbal cues;LE management   Additional Comments pt supine on RA upon arrival, seated in chair at end of session   Transfers   Sit to Stand 3  Moderate assistance   Additional items Assist x 2;Increased time required;Verbal cues;Bedrails  (RW)   Stand to Sit 3  Moderate assistance   Additional items Increased time required;Impulsive;Verbal cues;Other  (RW)   Stand pivot 3  Moderate assistance   Additional items Increased time required;Verbal cues;Impulsive  (Rw)   Additional Comments mod (A)x2 with RW to complete functional transfers. pt impulsive with SPT, max vc for safety. pt pushing the RW forward with trunk flexion; max vc to remain upright to reduce risk of falls; moderate instability   Functional Mobility   Additional Comments functional mobility not assessed d/t assist for SPT   Balance   Static Sitting Fair   Dynamic Sitting Fair -   Static Standing Fair -   Dynamic Standing Fair -    Activity Tolerance   Activity Tolerance Patient limited by fatigue;Treatment limited secondary to medical complications (Comment)   RUE Assessment   RUE Assessment WFL   LUE Assessment   LUE Assessment WFL   Hand Function   Gross Motor Coordination Functional   Fine Motor Coordination Functional   Sensation   Light Touch No apparent deficits   Sharp/Dull No apparent deficits   Psychosocial   Psychosocial (WDL) WDL   Patient Behaviors/Mood Cooperative   Cognition   Overall Cognitive Status WFL   Arousal/Participation Responsive;Alert   Attention Within functional limits   Orientation Level Oriented X4   Memory Within functional limits   Following Commands Follows one step commands with increased time or repetition   Assessment   Limitation Decreased ADL status;Decreased UE strength;Decreased Safe judgement during ADL;Decreased endurance;Decreased self-care trans;Decreased high-level ADLs   Assessment Pt is a 49 y.o. male seen for OT evaluation s/p admit to [facility] on 2/7/2025 w/ <principal problem not specified>.  Comorbidities affecting pt's functional performance at time of assessment include:  GERD, alcohol abuse, acute renal failure, rhabdomyolysis, diarrhea, acute diverticulitis, hepatomegaly, CVA . Personal factors affecting pt at time of IE include:steps to enter environment, limited home support, difficulty performing ADLS, difficulty performing IADLS , limited insight into deficits, compliance, decreased initiation and engagement , and health management . Prior to admission, pt was (I) with ADLs with no AD. Upon evaluation: Pt requires min- max (A) x1-2  2* the following deficits impacting occupational performance: weakness, decreased strength, decreased balance, decreased tolerance, impaired initiation, impulsivity, decreased safety awareness, and abnormal tone. Pt to benefit from continued skilled OT tx while in the hospital to address deficits as defined above and maximize level of functional independence w  ADL's and functional mobility. Occupational Performance areas to address include: grooming, bathing/shower, toilet hygiene, dressing, functional mobility, community mobility, and clothing management. The patient's raw score on the -PAC Daily Activity Inpatient Short Form is 16. A raw score of less than 19 suggests the patient may benefit from discharge to post-acute rehabilitation services. Discharge recommendation at this time is level I maximum resource intensity.  Pt benefited from co-evaluation of skilled OT and PT therapists in order to most appropriately address functional deficits d/t extensive assistance required for safe functional mobility, decreased activity tolerance, and regression from functioning level prior to admission and/or onset of present illness. OT/PT objectives were addressed separately; please see PT note for specific goal areas targeted.   Goals   Patient Goals to get better   Short Term Goal  pt will complete UE strengthening exercises   Long Term Goal #1 pt will complete functional transfers with RW and min (A)x1   Long Term Goal #2 pt will complete UB/LB bathing/dressing with min (A)   Long Term Goal pt will complete bed mobility with min (A)x1   Plan   Treatment Interventions ADL retraining;Functional transfer training;UE strengthening/ROM;Endurance training;Patient/family training;Equipment evaluation/education;Activityengagement   Goal Expiration Date 02/24/25   OT Frequency 3-5x/wk   Discharge Recommendation   Rehab Resource Intensity Level, OT I (Maximum Resource Intensity)   AM-PAC Daily Activity Inpatient   Lower Body Dressing 2   Bathing 2   Toileting 2   Upper Body Dressing 3   Grooming 3   Eating 4   Daily Activity Raw Score 16   Daily Activity Standardized Score (Calc for Raw Score >=11) 35.96   AM-PAC Applied Cognition Inpatient   Following a Speech/Presentation 4   Understanding Ordinary Conversation 4   Taking Medications 3   Remembering Where Things Are Placed or Put  Away 3   Remembering List of 4-5 Errands 2   Taking Care of Complicated Tasks 2   Applied Cognition Raw Score 18   Applied Cognition Standardized Score 38.07

## 2025-02-10 NOTE — PLAN OF CARE
Problem: RESPIRATORY - ADULT  Goal: Achieves optimal ventilation and oxygenation  Description: INTERVENTIONS:  - Assess for changes in respiratory status  - Assess for changes in mentation and behavior  - Position to facilitate oxygenation and minimize respiratory effort  - Oxygen administered by appropriate delivery if ordered  - Initiate smoking cessation education as indicated  - Encourage broncho-pulmonary hygiene including cough, deep breathe, Incentive Spirometry  - Assess the need for suctioning and aspirate as needed  - Assess and instruct to report SOB or any respiratory difficulty  - Respiratory Therapy support as indicated  Outcome: Progressing     Problem: SKIN/TISSUE INTEGRITY - ADULT  Goal: Skin Integrity remains intact(Skin Breakdown Prevention)  Description: Assess:  -Perform Tani assessment every 2  -Clean and moisturize skin every incont. Episode   -Inspect skin when repositioning, toileting, and assisting with ADLS  -Assess extremities for adequate circulation and sensation     Bed Management:  -Have minimal linens on bed & keep smooth, unwrinkled  -Change linens as needed when moist or perspiring  -Avoid sitting or lying in one position for more than 2 hours while in bed  -Keep HOB at 30 degrees     Toileting:  -Offer bedside commode  -Assess for incontinence every shift  -Use incontinent care products after each incontinent episode such as alejandra wipes    Activity:  -Mobilize patient 3 times a day  -Encourage activity and walks on unit  -Encourage or provide ROM exercises   -Turn and reposition patient every 2 Hours  -Use appropriate equipment to lift or move patient in bed  -Instruct/ Assist with weight shifting every 60 mins when out of bed in chair  -Consider limitation of chair time 6 hour intervals    Skin Care:  -Avoid use of baby powder, tape, friction and shearing, hot water or constrictive clothing  -Relieve pressure over bony prominences using foam wedges/pillows  -Do not massage red  bony areas    Outcome: Progressing     Problem: MUSCULOSKELETAL - ADULT  Goal: Maintain or return mobility to safest level of function  Description: INTERVENTIONS:  - Assess patient's ability to carry out ADLs; assess patient's baseline for ADL function and identify physical deficits which impact ability to perform ADLs (bathing, care of mouth/teeth, toileting, grooming, dressing, etc.)  - Assess/evaluate cause of self-care deficits   - Assess range of motion  - Assess patient's mobility  - Assess patient's need for assistive devices and provide as appropriate  - Encourage maximum independence but intervene and supervise when necessary  - Involve family in performance of ADLs  - Assess for home care needs following discharge   - Consider OT consult to assist with ADL evaluation and planning for discharge  - Provide patient education as appropriate  Outcome: Progressing     Problem: PAIN - ADULT  Goal: Verbalizes/displays adequate comfort level or baseline comfort level  Description: Interventions:  - Encourage patient to monitor pain and request assistance  - Assess pain using appropriate pain scale  - Administer analgesics based on type and severity of pain and evaluate response  - Implement non-pharmacological measures as appropriate and evaluate response  - Consider cultural and social influences on pain and pain management  - Notify physician/advanced practitioner if interventions unsuccessful or patient reports new pain  Outcome: Progressing     Problem: INFECTION - ADULT  Goal: Absence or prevention of progression during hospitalization  Description: INTERVENTIONS:  - Assess and monitor for signs and symptoms of infection  - Monitor lab/diagnostic results  - Monitor all insertion sites, i.e. indwelling lines, tubes, and drains  - Monitor endotracheal if appropriate and nasal secretions for changes in amount and color  - [place] appropriate cooling/warming therapies per order  - Administer medications as  ordered  - Instruct and encourage patient and family to use good hand hygiene technique  - Identify and instruct in appropriate isolation precautions for identified infection/condition  Outcome: Progressing     Problem: SAFETY ADULT  Goal: Patient will remain free of falls  Description: INTERVENTIONS:  - Educate patient/family on patient safety including physical limitations  - Instruct patient to call for assistance with activity   - Consult OT/PT to assist with strengthening/mobility   - Keep Call bell within reach  - Keep bed low and locked with side rails adjusted as appropriate  - Keep care items and personal belongings within reach  - Initiate and maintain comfort rounds  - Make Fall Risk Sign visible to staff  - Offer Toileting every 2 Hours, in advance of need  - Initiate/Maintain bed/chair alarm  - Obtain necessary fall risk management equipment: non skid socks  - Apply yellow socks and bracelet for high fall risk patients  - Consider moving patient to room near nurses station  Outcome: Progressing  Goal: Maintain or return to baseline ADL function  Description: INTERVENTIONS:  -  Assess patient's ability to carry out ADLs; assess patient's baseline for ADL function and identify physical deficits which impact ability to perform ADLs (bathing, care of mouth/teeth, toileting, grooming, dressing, etc.)  - Assess/evaluate cause of self-care deficits   - Assess range of motion  - Assess patient's mobility; develop plan if impaired  - Assess patient's need for assistive devices and provide as appropriate  - Encourage maximum independence but intervene and supervise when necessary  - Involve family in performance of ADLs  - Assess for home care needs following discharge   - Consider OT consult to assist with ADL evaluation and planning for discharge  - Provide patient education as appropriate  Outcome: Progressing  Goal: Maintains/Returns to pre admission functional level  Description: INTERVENTIONS:  - Perform  AM-PAC 6 Click Basic Mobility/ Daily Activity assessment daily.  - Set and communicate daily mobility goal to care team and patient/family/caregiver.   - Collaborate with rehabilitation services on mobility goals if consulted  - Perform Range of Motion 3 times a day.  - Reposition patient every 2 hours.  - Dangle patient 3 times a day  - Stand patient 3 times a day  - Ambulate patient 3 times a day  - Out of bed to chair 3 times a day   - Out of bed for meals 3 times a day  - Out of bed for toileting  - Record patient progress and toleration of activity level   Outcome: Progressing     Problem: DISCHARGE PLANNING  Goal: Discharge to home or other facility with appropriate resources  Description: INTERVENTIONS:  - Identify barriers to discharge w/patient and caregiver  - Arrange for needed discharge resources and transportation as appropriate  - Identify discharge learning needs (meds, wound care, etc.)  - Arrange for interpretive services to assist at discharge as needed  - Refer to Case Management Department for coordinating discharge planning if the patient needs post-hospital services based on physician/advanced practitioner order or complex needs related to functional status, cognitive ability, or social support system  Outcome: Progressing     Problem: Knowledge Deficit  Goal: Patient/family/caregiver demonstrates understanding of disease process, treatment plan, medications, and discharge instructions  Description: Complete learning assessment and assess knowledge base.  Interventions:  - Provide teaching at level of understanding  - Provide teaching via preferred learning methods  Outcome: Progressing     Problem: Prexisting or High Potential for Compromised Skin Integrity  Goal: Skin integrity is maintained or improved  Description: INTERVENTIONS:  - Identify patients at risk for skin breakdown  - Assess and monitor skin integrity  - Assess and monitor nutrition and hydration status  - Monitor labs   -  Assess for incontinence   - Turn and reposition patient  - Assist with mobility/ambulation  - Relieve pressure over bony prominences  - Avoid friction and shearing  - Provide appropriate hygiene as needed including keeping skin clean and dry  - Evaluate need for skin moisturizer/barrier cream  - Collaborate with interdisciplinary team   - Patient/family teaching  - Consider wound care consult   Outcome: Progressing     Problem: GASTROINTESTINAL - ADULT  Goal: Maintains or returns to baseline bowel function  Description: INTERVENTIONS:  - Assess bowel function  - Encourage oral fluids to ensure adequate hydration  - Administer IV fluids if ordered to ensure adequate hydration  - Administer ordered medications as needed  - Encourage mobilization and activity  - Consider nutritional services referral to assist patient with adequate nutrition and appropriate food choices  Outcome: Progressing

## 2025-02-10 NOTE — ASSESSMENT & PLAN NOTE
Continue supportive care, as noted previously, patient most likely acute tubular necrosis but has not been keeping up with his fluids with probable substantial fluid losses still ongoing.  Continue to optimize care and avoid potential for nephrotoxins.  Decrease NSS to 100 ml/Hr and maintain bicarbonate 150 mEq at 75 ml/hr, please refer below for details.

## 2025-02-10 NOTE — ASSESSMENT & PLAN NOTE
Patient with hyperbilirbuinemia and elevated LFT in the setting of rhabdomyolysis and ETOH use    Likely due to alcohol use and FLD

## 2025-02-10 NOTE — PROGRESS NOTES
Progress Note - Hospitalist   Name: Emiliano Rodriguez 49 y.o. male I MRN: [id number]  Unit/Bed#: 404-01 I Date of Admission: 2/7/2025   Date of Service: 2/10/2025 I Hospital Day: 3    Assessment & Plan  Acute renal failure (ARF) (HCC)  Likely due to prerenal azotemia and rhabdomyolysis, IV fluid management per nephrology  Hyponatremia  Improved  Rhabdomyolysis  CK level improving  Acute diverticulitis  Day 4 Ceftriaxone Flagyl         Diarrhea  Patient with severe diarrhea , will utilize rectal tube   Start banana flakes and Probiotics  C.diff Negative  Enteric Panel negative     Abnormal LFTs  Patient with hyperbilirbuinemia and elevated LFT in the setting of rhabdomyolysis and ETOH use    Likely due to alcohol use and FLD     Gastroesophageal reflux disease without esophagitis  Protonix 40 mg daily    Outpatient EGD per GI  Alcohol abuse  Endorses at least 5 beers a day, although I suspect this is understated  Folic acid, thiamine  Initiate CIWA protocol  Hypokalemia  Monitor potassium level daily  Volume depletion  Continue IV fluid, encourage p.o. intake  Acute tubular necrosis (HCC)  Suspected component of ATN, nephrology consult appreciated.    Code Status: Level 1 - Full Code    Subjective   No pain, patient reports ongoing left-sided weakness    Objective :  Temp:  [97.6 °F (36.4 °C)-98.3 °F (36.8 °C)] 97.6 °F (36.4 °C)  HR:  [80-89] 80  BP: (122-132)/(79-89) 122/79  Resp:  [18-20] 20  SpO2:  [94 %-98 %] 94 %  O2 Device: None (Room air)    Body mass index is 32.35 kg/m².     Input and Output Summary (last 24 hours):     Intake/Output Summary (Last 24 hours) at 2/10/2025 1329  Last data filed at 2/10/2025 1307  Gross per 24 hour   Intake 2565 ml   Output 2325 ml   Net 240 ml       Physical Exam  Vitals and nursing note reviewed.   Constitutional:       General: He is not in acute distress.     Appearance: He is not ill-appearing, toxic-appearing or diaphoretic.      Comments: Chronically ill-appearing male,  appears older than stated age of 49 years   HENT:      Head: Normocephalic and atraumatic.   Cardiovascular:      Rate and Rhythm: Normal rate.      Pulses: Normal pulses.   Pulmonary:      Effort: Pulmonary effort is normal.      Breath sounds: Normal breath sounds.   Abdominal:      General: Bowel sounds are normal.   Musculoskeletal:         General: Normal range of motion.      Cervical back: Normal range of motion.   Skin:     General: Skin is warm and dry.      Coloration: Skin is not jaundiced.      Findings: No bruising.   Neurological:      Mental Status: He is alert and oriented to person, place, and time. Mental status is at baseline.   Psychiatric:         Mood and Affect: Mood normal.         Behavior: Behavior normal.         Thought Content: Thought content normal.         Judgment: Judgment normal.           Lines/Drains:  Lines/Drains/Airways       Active Status       Name Placement date Placement time Site Days    Rectal Tube 02/08/25  1018  --  2                            Lab Results: I have reviewed the following results:   Results from last 7 days   Lab Units 02/10/25  0406   WBC Thousand/uL 9.56   HEMOGLOBIN g/dL 10.0*   HEMATOCRIT % 31.4*   PLATELETS Thousands/uL 119*   SEGS PCT % 81*   LYMPHO PCT % 8*   MONO PCT % 8   EOS PCT % 1     Results from last 7 days   Lab Units 02/10/25  0406   SODIUM mmol/L 134*   POTASSIUM mmol/L 3.4*   CHLORIDE mmol/L 107   CO2 mmol/L 16*   BUN mg/dL 34*   CREATININE mg/dL 4.78*   ANION GAP mmol/L 11   CALCIUM mg/dL 7.2*   ALBUMIN g/dL 2.0*   TOTAL BILIRUBIN mg/dL 2.73*   ALK PHOS U/L 113*   ALT U/L 335*   AST U/L 880*   GLUCOSE RANDOM mg/dL 71                 Results from last 7 days   Lab Units 02/07/25  0453   LACTIC ACID mmol/L 1.3   PROCALCITONIN ng/ml 3.09*       Recent Cultures (last 7 days):   Results from last 7 days   Lab Units 02/07/25  0906   C DIFF TOXIN B BY PCR  Negative       Imaging Results Review: No pertinent imaging studies reviewed.  Other  Study Results Review: No additional pertinent studies reviewed.    Last 24 Hours Medication List:     Current Facility-Administered Medications:     acetaminophen (TYLENOL) tablet 650 mg, Q6H PRN    aluminum-magnesium hydroxide-simethicone (MAALOX) oral suspension 30 mL, Q4H PRN    cefTRIAXone (ROCEPHIN) IVPB (premix in dextrose) 1,000 mg 50 mL, Q24H, Last Rate: 1,000 mg (02/10/25 0821)    clopidogrel (PLAVIX) tablet 75 mg, Daily    folic acid (FOLVITE) tablet 1 mg, Daily    heparin (porcine) subcutaneous injection 5,000 Units, Q8H DAVION **AND** [COMPLETED] Platelet count, Once    metoprolol succinate (TOPROL-XL) 24 hr tablet 25 mg, Daily    metroNIDAZOLE (FLAGYL) tablet 500 mg, Q8H DAVION    nicotine (NICODERM CQ) 21 mg/24 hr TD 24 hr patch 1 patch, Daily    pantoprazole (PROTONIX) EC tablet 40 mg, Early Morning    saccharomyces boulardii (FLORASTOR) capsule 250 mg, BID    sodium bicarbonate 150 mEq in dextrose 5 % 1,000 mL infusion, Continuous, Last Rate: 75 mL/hr (02/10/25 0057)    sodium chloride 0.9 % infusion, Continuous, Last Rate: 150 mL/hr (02/10/25 1012)    thiamine tablet 100 mg, Daily    Administrative Statements   Today, Patient Was Seen By: Isaías Eller DO      **Please Note: This note may have been constructed using a voice recognition system.**

## 2025-02-10 NOTE — ASSESSMENT & PLAN NOTE
CK trending down 17,495 on 2/10, decreased from >20,000 on 2/9.  Continue with IV fluids as noted above.  Phosphorus 3.0 mg/dL, Magnesium 1.6 mg/dL on 2/9. Continue to replete phosphorus and magnesium levels aggressively.

## 2025-02-10 NOTE — PROGRESS NOTES
Progress Note - Nephrology   Name: Emiliano Rodriguez 49 y.o. male I MRN: [id number]  Unit/Bed#: 404-01 I Date of Admission: 2/7/2025   Date of Service: 2/10/2025 I Hospital Day: 3    Assessment & Plan  Acute renal failure (ARF) (HCC)  Continue supportive care, as noted previously, patient most likely acute tubular necrosis but has not been keeping up with his fluids with probable substantial fluid losses still ongoing.  Continue to optimize care and avoid potential for nephrotoxins.  Decrease NSS to 100 ml/Hr and maintain bicarbonate 150 mEq at 75 ml/hr, please refer below for details.    Acute tubular necrosis (HCC)  Continue supportive care at this time, repeat urine studies confirm ATN.  Maintain appropriate volume and nutritional status.  Hyponatremia  Sodium level now 134 mmol/L up from 130 mmol/L on 2/7. Decrease NSS to 100 ml/hr.   Hypokalemia  Potassium 3.4 mmol/L, ordered potassium 40 mEq x 3 doses. Recheck AM labs.  Volume depletion  Has significantly improved, decreased NSS to 100 ml/hr.   Diarrhea  Continue with current management according to our hospitalist colleagues. Zazzy rectal tube in place.   Rhabdomyolysis  CK trending down 17,495 on 2/10, decreased from >20,000 on 2/9.  Continue with IV fluids as noted above.  Phosphorus 3.0 mg/dL, Magnesium 1.6 mg/dL on 2/9. Continue to replete phosphorus and magnesium levels aggressively.  Gastroesophageal reflux disease without esophagitis  Continue with PPI, potential endoscopy going forward  Alcohol abuse  Continue [facility] protocol, further care and management according to hospitalist recommendations.  Abnormal LFTs  Potentially related to alcohol intake, will be following with gastroenterology.  Acute diverticulitis  Day 3 of ceftriaxone and metronidazole    I have reviewed the nephrology recommendations including creatinine trends and volume status with hospitalist and we are in agreement with renal plan including the information outlined above.      Subjective   Brief History of Admission - 49 y.o male with PMH of alcohol use and esophageal reflux who presents with weakness to the bilateral lower extremities. States he had been in his recliner at home for 2-3 days. Endorses drinking about 5 beers per day and smoking 1.5 packs of cigarettes daily. Nephrology is following for GUNJAN, rhabdomyolysis, and hyponatremia.     Patient is examined resting in bed watching television. States he feels very weak and is unable to lift his legs off the bed.        Objective :  Temp:  [97.6 °F (36.4 °C)-98.3 °F (36.8 °C)] 97.6 °F (36.4 °C)  HR:  [80-89] 80  BP: (122-132)/(79-89) 122/79  Resp:  [18-20] 20  SpO2:  [94 %-98 %] 94 %  O2 Device: None (Room air)    Current Weight: Weight - Scale: 90.9 kg (200 lb 6.4 oz)  First Weight: Weight - Scale: 90.3 kg (199 lb 1.2 oz)  I/O         02/08 0701  02/09 0700 02/09 0701  02/10 0700 02/10 0701  02/11 0700    P.O. 600 720     I.V. (mL/kg) 980 (10.8) 980 (10.8) 1105 (12.2)    IV Piggyback       Total Intake(mL/kg) 1580 (17.4) 1700 (18.7) 1105 (12.2)    Urine (mL/kg/hr) 1500 (0.7) 1650 (0.8) 200 (0.6)    Stool 350 500     Total Output 1850 2150 200    Net -270 -450 +905           Unmeasured Stool Occurrence  800 x           Physical Exam  Vitals and nursing note reviewed.   Constitutional:       General: He is not in acute distress.     Appearance: He is well-developed. He is obese. He is ill-appearing.   HENT:      Head: Normocephalic and atraumatic.      Right Ear: External ear normal.      Left Ear: External ear normal.      Nose: Nose normal.      Mouth/Throat:      Mouth: Mucous membranes are moist.      Pharynx: Oropharynx is clear.   Eyes:      Extraocular Movements: Extraocular movements intact.      Conjunctiva/sclera: Conjunctivae normal.      Pupils: Pupils are equal, round, and reactive to light.   Cardiovascular:      Rate and Rhythm: Normal rate and regular rhythm.      Heart sounds: Normal heart sounds. No murmur  heard.  Pulmonary:      Effort: Pulmonary effort is normal. No respiratory distress.      Breath sounds: Normal breath sounds.   Abdominal:      Palpations: Abdomen is soft.      Tenderness: There is no abdominal tenderness.   Musculoskeletal:         General: No swelling.      Cervical back: Neck supple.      Right lower leg: No edema.      Left lower leg: No edema.   Skin:     General: Skin is warm and dry.      Capillary Refill: Capillary refill takes less than 2 seconds.   Neurological:      General: No focal deficit present.      Mental Status: He is alert and oriented to person, place, and time.   Psychiatric:         Mood and Affect: Mood normal.         Behavior: Behavior normal.       Medications:    Current Facility-Administered Medications:     acetaminophen (TYLENOL) tablet 650 mg, 650 mg, Oral, Q6H PRN, Prudencio Nix MD    aluminum-magnesium hydroxide-simethicone (MAALOX) oral suspension 30 mL, 30 mL, Oral, Q4H PRN, Dominique Farris MD, 30 mL at 02/09/25 2102    cefTRIAXone (ROCEPHIN) IVPB (premix in dextrose) 1,000 mg 50 mL, 1,000 mg, Intravenous, Q24H, Ankita Katz MD, Last Rate: 100 mL/hr at 02/10/25 0821, 1,000 mg at 02/10/25 0821    clopidogrel (PLAVIX) tablet 75 mg, 75 mg, Oral, Daily, Ankita Katz MD, 75 mg at 02/10/25 0821    folic acid (FOLVITE) tablet 1 mg, 1 mg, Oral, Daily, Ankita Katz MD, 1 mg at 02/10/25 0821    heparin (porcine) subcutaneous injection 5,000 Units, 5,000 Units, Subcutaneous, Q8H [facility] **AND** [COMPLETED] Platelet count, , , Once, Ankita Katz MD    metoprolol succinate (TOPROL-XL) 24 hr tablet 25 mg, 25 mg, Oral, Daily, Ankita Katz MD, 25 mg at 02/10/25 0821    metroNIDAZOLE (FLAGYL) tablet 500 mg, 500 mg, Oral, Q8H DAVION, Ankita Katz MD, 500 mg at 02/10/25 0522    nicotine (NICODERM CQ) 21 mg/24 hr TD 24 hr patch 1 patch, 1 patch, Transdermal, Daily, Ankita Katz MD, 1 patch at 02/10/25 0820    pantoprazole (PROTONIX) EC tablet 40 mg, 40 mg,  Oral, Early Morning, Ankita Katz MD, 40 mg at 02/10/25 0522    saccharomyces boulardii (FLORASTOR) capsule 250 mg, 250 mg, Oral, BID, Ankita Katz MD, 250 mg at 02/10/25 0821    sodium bicarbonate 150 mEq in dextrose 5 % 1,000 mL infusion, 75 mL/hr, Intravenous, Continuous, Luciano Guadarrama DO, Last Rate: 75 mL/hr at 02/10/25 0057, 75 mL/hr at 02/10/25 0057    sodium chloride 0.9 % infusion, 150 mL/hr, Intravenous, Continuous, Luciano Guadarrama DO, Last Rate: 150 mL/hr at 02/10/25 1012, 150 mL/hr at 02/10/25 1012    thiamine tablet 100 mg, 100 mg, Oral, Daily, Ankita Katz MD, 100 mg at 02/10/25 0821      Lab Results: I have reviewed the following results:  Results from last 7 days   Lab Units 02/10/25  0406 02/09/25  0546 02/08/25  0903 02/08/25  0437 02/07/25  2355 02/07/25  2001 02/07/25  1651 02/07/25  0813 02/07/25  0725 02/07/25  0547 02/07/25  0453   WBC Thousand/uL 9.56 8.39  --  6.95  --   --   --   --   --   --  9.64   HEMOGLOBIN g/dL 10.0* 10.6*  --  10.8*  --   --   --   --   --   --  13.0   HEMATOCRIT % 31.4* 33.2*  --  32.8*  --   --   --   --   --   --  38.1   PLATELETS Thousands/uL 119* 118*  --  108*  --   --   --   --  105*  --  141*   POTASSIUM mmol/L 3.4* 4.0 3.6 3.2*  3.3* 3.5 3.1* 3.5   < >  --   --  3.5   CHLORIDE mmol/L 107 110* 104 103  104 103 102 102   < >  --   --  98   CO2 mmol/L 16* 15* 18* 20*  19* 19* 20* 22   < >  --   --  20*   BUN mg/dL 34* 29* 25 24  24 23 23 21   < >  --   --  18   CREATININE mg/dL 4.78* 4.66* 4.30* 4.06*  4.06* 3.89* 3.64* 3.48*   < >  --   --  3.07*   CALCIUM mg/dL 7.2* 7.6* 8.2* 8.1*  8.0* 8.3* 8.1* 8.2*   < >  --   --  9.2   MAGNESIUM mg/dL  --  1.6*  --   --   --   --   --   --   --  1.8*  --    PHOSPHORUS mg/dL  --  3.0  --  2.6*  --   --   --   --   --  2.7  --    ALBUMIN g/dL 2.0* 2.1*  --  2.1*  --   --   --   --   --   --  2.3*    < > = values in this interval not displayed.       Administrative Statements     Portions of the record  "may have been created with voice recognition software. Occasional wrong word or \"sound a like\" substitutions may have occurred due to the inherent limitations of voice recognition software. Read the chart carefully and recognize, using context, where substitutions have occurred.If you have any questions, please contact the dictating provider.  "

## 2025-02-10 NOTE — ASSESSMENT & PLAN NOTE
Continue with current management according to our hospitalist colleagues. Zazzy rectal tube in place.

## 2025-02-10 NOTE — UTILIZATION REVIEW
NOTIFICATION OF INPATIENT ADMISSION   AUTHORIZATION REQUEST   SERVICING FACILITY:   [place] [address]  Tax ID:  [id number]  NPI: [id number] ATTENDING PROVIDER:  Attending Name and NPI#: Isaías Eller Do [[id number]]  Address: [address] [place] [address]  Phone: [phone]     ADMISSION INFORMATION:  Place of Service: Inpatient Acute [facility]  Place of Service Code: 21  Inpatient Admission Date/Time: 2/7/25  7:06 AM  Discharge Date/Time: No discharge date for patient encounter.  Admitting Diagnosis Code/Description:  Rhabdomyolysis [M62.82]  Hyperbilirubinemia [E80.6]  Alcohol abuse [F10.10]  Hyponatremia [E87.1]  Weakness [R53.1]  Elevated LFTs [R79.89]  GUNJAN (acute kidney injury) (HCC) [N17.9]  Acute diverticulitis [K57.92]     UTILIZATION REVIEW CONTACT:  Marlon Cavazos, Utilization   Network Utilization Review Department  Phone: [phone]  Fax [phone]  Email: [email or web address]  Contact for approvals/pending authorizations, clinical reviews, and discharge.     PHYSICIAN ADVISORY SERVICES:  Medical Necessity Denial & Yojg-rf-Hmlk Review  Phone: [phone]  Fax: [phone]  Email: [email or web address]     DISCHARGE SUPPORT TEAM:  For Patients Discharge Needs & Updates  Phone: [phone] opt. 2 Fax: [phone]  Email: [email or web address]

## 2025-02-10 NOTE — UTILIZATION REVIEW
Initial Clinical Review    Admission: Date/Time/Statement:   Admission Orders (From admission, onward)       Ordered        02/07/25 0706  INPATIENT ADMISSION  Once                          Orders Placed This Encounter   Procedures    INPATIENT ADMISSION     Standing Status:   Standing     Number of Occurrences:   1     Level of Care:   Med Surg [16]     Estimated length of stay:   More than 2 Midnights     Certification:   I certify that inpatient services are medically necessary for this patient for a duration of greater than two midnights. See H&P and MD Progress Notes for additional information about the patient's course of treatment.     ED Arrival Information       Expected   -    Arrival   2/7/2025 04:21    Acuity   Urgent              Means of arrival   Ambulance    Escorted by   [facility] [place]    Service   Hospitalist    Admission type   Emergency              Arrival complaint   weak             Chief Complaint   Patient presents with    Medical Problem     Came in by EMS with poor ambulation. States x1 week unable to ambulate at home. Using walker to get around and having falls. States legs feel heavy mostly in thigh. [facility]       Initial Presentation: 49 y.o. male to ED from home w/ PMH of alcohol use and esophageal reflux who presents with weakness to the bilateral lower extremities.  The patient states he has been in his recliner for the past 2 to 3 days.  He states he is unemployed and drinks about 5 beers a day, however I feel this is understated. Found to have CK 19k , Na 127 . Given 1.5 l in ED , repeat Na 130 . Admitted IP status w/ ARF , acute diverticulitis , rhabdo , hyponatremia . Cont IVF , check renal US w/ PVR , nephrology consult , check urine lytes . Ceftriaxone , flagyl , check Cdiff , enteric panel , giardia , calprotectic , urine Na , trend CK . Abnormal LFTs check RUQ US , acute on chronic  "hep panel , GI consult , protonix . Alcohol abuse folic acid , thiamine , CIWA .     Anticipated Length of Stay/Certification Statement:  Patient will be admitted on an inpatient basis with an anticipated length of stay of greater than 2 midnights secondary to ARF.      Nephrology Consult   GUNJAN , HTN , Hyponatremia . Cr baseline 0.5-0.6 , on admission 3.07 and 3.22 . Renal imagin.6 cm exophytic cyst in the left kidney, 5 mm stone with no hydronephrosis. Cont NSS , trend BMP . HTN lopressor . Hyponatremia possibly related to beer potomania , poor intake . Cont NSS  goal 133 in am , monitor BMP q4hr . Rhabdo cont to trend CK level .      GI Consult   Elevated Lfts , acute uncomplicated diverticulitis , chronic diarrhea . check comprehensive serologic panel to rule out other potential causes. No indication for prednisolone at this time. IV hydration for rhabdomyolysis , follow LFTs . Ok to cont abx . Monitor stool output and f/u stool studies .     Date:    Day 2: mental status has improved but he complains of diarrhea that \"is pouring out of me\" . + scleral icterus , abd distention . Na 132-133 . CR inc to 4.30 . K improved from 3.2 to 3.6. Will increase patient's IV fluids from 100 mL/hr up to 150 mL/hour.   Continue ceftriaxone and metronidazole for acute diverticulitis .   ED Treatment-Medication Administration from 2025 0421 to 2025 1416         Date/Time Order Dose Route Action     2025 0552 multi-electrolyte (ISOLYTE-S PH 7.4) bolus 1,000 mL 1,000 mL Intravenous New Bag     2025 0708 multi-electrolyte (ISOLYTE-S PH 7.4) bolus 1,000 mL 1,000 mL Intravenous New Bag     2025 0815 ampicillin-sulbactam (UNASYN) 3 g in sodium chloride 0.9 % 100 mL IVPB 3 g Intravenous New Bag     2025 0818 folic acid 1 mg, thiamine (VITAMIN B1) 100 mg in sodium chloride 0.9 % 100 mL IV piggyback -- Intravenous New Bag     2025 0820 clopidogrel (PLAVIX) tablet 75 mg 75 mg Oral " Given     02/07/2025 0821 metoprolol succinate (TOPROL-XL) 24 hr tablet 25 mg 25 mg Oral Given     02/07/2025 0836 cefTRIAXone (ROCEPHIN) IVPB (premix in dextrose) 1,000 mg 50 mL 1,000 mg Intravenous New Bag     02/07/2025 0821 metroNIDAZOLE (FLAGYL) tablet 500 mg 500 mg Oral Given     02/07/2025 1404 metroNIDAZOLE (FLAGYL) tablet 500 mg 500 mg Oral Given     02/07/2025 0818 sodium chloride 0.9 % infusion 150 mL/hr Intravenous New Bag     02/07/2025 1030 sodium chloride 0.9 % infusion 100 mL/hr Intravenous Rate/Dose Change            Scheduled Medications:  cefTRIAXone, 1,000 mg, Intravenous, Q24H  clopidogrel, 75 mg, Oral, Daily  folic acid, 1 mg, Oral, Daily  heparin (porcine), 5,000 Units, Subcutaneous, Q8H DAVION  metoprolol succinate, 25 mg, Oral, Daily  metroNIDAZOLE, 500 mg, Oral, Q8H DAVION  nicotine, 1 patch, Transdermal, Daily  pantoprazole, 40 mg, Oral, Early Morning  saccharomyces boulardii, 250 mg, Oral, BID  thiamine, 100 mg, Oral, Daily      Continuous IV Infusions:  sodium chloride 0.9 % infusion, Continuous, Last Rate: 100 mL/hr (02/08/25 0223)       PRN Meds:  acetaminophen, 650 mg, Oral, Q6H PRN  aluminum-magnesium hydroxide-simethicone, 30 mL, Oral, Q4H PRN      ED Triage Vitals [02/07/25 0425]   Temperature Pulse Respirations Blood Pressure SpO2 Pain Score   98.1 °F (36.7 °C) 83 19 122/80 95 % No Pain     Weight (last 2 days)       None            Vital Signs (last 3 days)       Date/Time Temp Pulse Resp BP MAP (mmHg) SpO2 O2 Device Patient Position - Orthostatic VS [place] Coma Scale Score CIWA-Ar Total Pain    02/08/25 7794 -- -- -- -- -- -- None (Room air) -- 15 -- No Pain    02/08/25 23:24:32 98 °F (36.7 °C) 79 18 129/91 104 96 % -- -- -- 0 --    02/08/25 1926 98.3 °F (36.8 °C) 82 20 126/88 101 96 % -- -- -- -- --    02/08/25 1900 -- -- -- -- -- -- -- -- -- 0 --    02/08/25 14:38:42 98 °F (36.7 °C) 86 19 112/78 89 97 % -- -- -- -- --    02/08/25 07:25:08 97.5 °F (36.4 °C) 83 20 117/87 97 95 % --  -- -- -- --    02/08/25 0705 -- -- -- -- -- -- -- -- -- -- No Pain    02/07/25 23:40:38 97.7 °F (36.5 °C) 77 18 117/82 94 98 % -- -- -- -- --    02/07/25 1930 -- -- -- -- -- -- -- -- -- -- No Pain    02/07/25 1700 -- -- -- -- -- -- -- -- 15 -- --    02/07/25 14:33:52 97.7 °F (36.5 °C) 75 19 122/82 95 97 % None (Room air) -- -- -- --    02/07/25 1200 -- 81 20 139/71 91 92 % None (Room air) Lying -- -- --    02/07/25 0845 -- -- -- -- -- -- None (Room air) -- 15 -- --    02/07/25 0800 97.5 °F (36.4 °C) 78 22 114/79 93 91 % None (Room air) Lying -- 0 No Pain    02/07/25 0730 98 °F (36.7 °C) 78 20 120/78 95 93 % None (Room air) Lying -- -- No Pain    02/07/25 0700 -- 75 18 116/75 91 93 % None (Room air) -- -- -- No Pain    02/07/25 0600 -- 73 20 115/83 95 93 % None (Room air) -- -- -- No Pain    02/07/25 0500 97.5 °F (36.4 °C) 79 18 117/81 94 96 % None (Room air) -- -- -- No Pain    02/07/25 0458 -- -- -- -- -- -- -- -- 15 -- --    02/07/25 0425 98.1 °F (36.7 °C) 83 19 122/80 97 95 % None (Room air) -- -- -- No Pain            Pertinent Labs/Diagnostic Test Results:   Radiology:  US kidney and bladder with pvr   Final Interpretation by Génesis Adair MD (02/08 1145)   Technically limited study. Left lower pole cyst not well visualized.   No hydronephrosis.   20 mL post void residual in the bladder.               Workstation performed: [id number]         US right upper quadrant with liver dopplers   Final Interpretation by Génesis Adair MD (02/08 1143)   Hepatomegaly. Steatosis, also demonstrated on the recent CT.   No biliary dilatation.   Patent major hepatic vessels with normodirectional flow.         Workstation performed: [id number]         CT head wo contrast   Final Interpretation by Timo Walters MD (02/07 0634)      No acute intracranial abnormality.      Mildly increased ventricular prominence since the prior studies raising the question of normal pressure hydrocephalus.            Workstation  performed: [id number]         CT abdomen pelvis wo contrast   Final Interpretation by Timo Walters MD (02/07 0649)      Acute sigmoid diverticulitis. Inflamed sigmoid abuts the dome of the urinary bladder, with associated bladder wall thickening raising the question of developing fistula.      Hepatosplenomegaly with hepatic steatosis.      Indeterminate 1.6 cm left renal cyst. Recommend follow-up ultrasound.      Nonobstructing right renal calculus.      The study was marked in EPIC for immediate notification.      Workstation performed: [id number]         XR chest 1 view portable   Final Interpretation by Timo Walters MD (02/07 0802)      No acute cardiopulmonary disease.            Workstation performed: [id number]           Cardiology:  ECG 12 lead   Final Result by Josué Wills DO (02/07 0846)   Normal sinus rhythm   Possible Lateral infarct (cited on or before 31-Dec-2018)   Possible Inferior infarct (cited on or before 31-Dec-2018)   Prolonged QT   Abnormal ECG   When compared with ECG of 31-Dec-2018 14:40,   Questionable change in initial forces of Inferior leads   ST now depressed in Anterior leads   Nonspecific T wave abnormality now evident in Anterior leads   Confirmed by Josué Wills ([id number]) on 2/7/2025 8:46:02 AM        GI:  No orders to display           Results from last 7 days   Lab Units 02/08/25  0437 02/07/25  0725 02/07/25  0453   WBC Thousand/uL 6.95  --  9.64   HEMOGLOBIN g/dL 10.8*  --  13.0   HEMATOCRIT % 32.8*  --  38.1   PLATELETS Thousands/uL 108*   < > 141*   TOTAL NEUT ABS Thousands/µL 5.41  --  7.46    < > = values in this interval not displayed.         Results from last 7 days   Lab Units 02/08/25  0903 02/08/25  0437 02/07/25  2355 02/07/25  0813 02/07/25  0547   SODIUM mmol/L 132* 132*  133* 130*   < >  --    POTASSIUM mmol/L 3.6 3.2*  3.3* 3.5   < >  --    CHLORIDE mmol/L 104 103  104 103   < >  --    CO2 mmol/L 18* 20*  19* 19*   < >  --    ANION  GAP mmol/L 10 9  10 8   < >  --    BUN mg/dL 25 24  24 23   < >  --    CREATININE mg/dL 4.30* 4.06*  4.06* 3.89*   < >  --    EGFR ml/min/1.73sq m 15 16  16 17   < >  --    CALCIUM mg/dL 8.2* 8.1*  8.0* 8.3*   < >  --    MAGNESIUM mg/dL  --   --   --   --  1.8*   PHOSPHORUS mg/dL  --  2.6*  --   --  2.7    < > = values in this interval not displayed.     Results from last 7 days   Lab Units 02/08/25  0437 02/07/25  0813 02/07/25  0547 02/07/25  0453   AST U/L 913*  --   --  866*   ALT U/L 288*  --   --  313*   ALK PHOS U/L 139*  --   --  177*   TOTAL PROTEIN g/dL 5.2*  --   --  5.9*   ALBUMIN g/dL 2.1*  --   --  2.3*   TOTAL BILIRUBIN mg/dL 4.21*  --   --  5.69*   BILIRUBIN DIRECT mg/dL  --  3.35*  --   --    AMMONIA umol/L  --   --  76*  --          Results from last 7 days   Lab Units 02/08/25  0903 02/08/25  0437 02/07/25  2355 02/07/25 2001 02/07/25  1651 02/07/25  1131 02/07/25  0813 02/07/25  0453   GLUCOSE RANDOM mg/dL 87 74  75 91 117 86 92 86 95         Results from last 7 days   Lab Units 02/08/25  0437   CK TOTAL U/L 16,069*       Results from last 7 days   Lab Units 02/07/25  0453   TSH 3RD GENERATON uIU/mL 1.190     Results from last 7 days   Lab Units 02/07/25  0453   PROCALCITONIN ng/ml 3.09*     Results from last 7 days   Lab Units 02/07/25  0453   LACTIC ACID mmol/L 1.3     Results from last 7 days   Lab Units 02/07/25  0725 02/07/25  0547   HEP B S AG  Non-reactive Non-reactive   HEP C AB  Non-reactive Non-reactive   HEP B C IGM  Non-reactive Non-reactive   HEP B C TOTAL AB  Non-reactive  --      Results from last 7 days   Lab Units 02/07/25  0453   LIPASE u/L 55     Results from last 7 days   Lab Units 02/07/25  0453   CRP mg/L 43.7*   SED RATE mm/hour 98*       Results from last 7 days   Lab Units 02/08/25  1439 02/07/25  0726 02/07/25  0515   CLARITY UA   --   --  Slightly Cloudy   COLOR UA   --   --  Yellow   SPEC GRAV UA   --   --  1.010   PH UA   --   --  6.0   GLUCOSE UA mg/dl  --    --  Negative   KETONES UA mg/dl  --   --  Negative   BLOOD UA   --   --  Large*   PROTEIN UA mg/dl  --   --  100 (2+)*   NITRITE UA   --   --  Negative   BILIRUBIN UA   --   --  Negative   UROBILINOGEN UA (BE) mg/dl  --   --  <2.0   LEUKOCYTES UA   --   --  Negative   WBC UA /hpf  --   --  0-1   RBC UA /hpf  --   --  0-1   BACTERIA UA /hpf  --   --  Occasional   EPITHELIAL CELLS WET PREP /hpf  --   --  Occasional   SODIUM UR mmol/L 29.0 21.0  --    CREATININE UR mg/dL 51.0 56.5  --      Results from last 7 days   Lab Units 02/07/25  0547   ETHANOL LVL mg/dL <10   ACETAMINOPHEN LVL ug/mL <2*   SALICYLATE LVL mg/dL <5     Results from last 7 days   Lab Units 02/07/25  0906   C DIFF TOXIN B BY PCR  Negative     Results from last 7 days   Lab Units 02/07/25  0906   SALMONELLA SP PCR  Negative   SHIGELLA SP/ENTEROINVASIVE E. COLI (EIEC)  Negative   CAMPYLOBACTER SP (JEJUNI AND COLI)  Negative   SHIGA TOXIN 1/SHIGA TOXIN 2  Negative         Past Medical History:   Diagnosis Date    GERD (gastroesophageal reflux disease)     TIA (transient ischemic attack)      Present on Admission:   Gastroesophageal reflux disease without esophagitis   Alcohol abuse   Hypokalemia   Volume depletion   Acute tubular necrosis (HCC)      Admitting Diagnosis: Rhabdomyolysis [M62.82]  Hyperbilirubinemia [E80.6]  Alcohol abuse [F10.10]  Hyponatremia [E87.1]  Weakness [R53.1]  Elevated LFTs [R79.89]  GUNJAN (acute kidney injury) (HCC) [N17.9]  Acute diverticulitis [K57.92]  Age/Sex: 49 y.o. male    Network Utilization Review Department  ATTENTION: Please call with any questions or concerns to [phone] and carefully listen to the prompts so that you are directed to the right person. All voicemails are confidential.   For Discharge needs, contact Care Management DC Support Team at [phone] opt. 2  Send all requests for admission clinical reviews, approved or denied determinations and any other requests to dedicated fax number below belonging  to the campus where the patient is receiving treatment. List of dedicated fax numbers for the Facilities:  FACILITY NAME UR FAX NUMBER   ADMISSION DENIALS (Administrative/Medical Necessity) [phone]   DISCHARGE SUPPORT TEAM (NETWORK) [phone]   PARENT CHILD HEALTH (Maternity/NICU/Pediatrics) [phone]   [facility] [phone]   [facility] [phone]   [facility] [phone]   [facility] [phone]   [facility] [phone]   [facility] [phone]   [facility] [phone]   [facility] [phone]   [facility] [phone]   [facility] [phone]   [facility] [phone]   [facility] [phone]

## 2025-02-10 NOTE — PHYSICAL THERAPY NOTE
"                                                      Physical Therapy Evaluation    Patient Name: Emiliano Rodriguez    Today's Date: 2/10/2025     Problem List  Active Problems:    Gastroesophageal reflux disease without esophagitis    Alcohol abuse    Acute renal failure (ARF) (HCC)    Hyponatremia    Rhabdomyolysis    Abnormal LFTs    Acute diverticulitis    Diarrhea    Hypokalemia    Volume depletion    Acute tubular necrosis (HCC)       Past Medical History  Past Medical History:   Diagnosis Date    GERD (gastroesophageal reflux disease)     TIA (transient ischemic attack)         Past Surgical History  History reviewed. No pertinent surgical history.        02/10/25 1333   PT Last Visit   PT Visit Date 02/10/25   Note Type   Note type Evaluation   Pain Assessment   Pain Assessment Tool 0-10   Pain Score No Pain   Restrictions/Precautions   Weight Bearing Precautions Per Order No   Other Precautions Fall Risk;Pain;Multiple lines;Bed Alarm;Chair Alarm   Home Living   Type of Home House   Home Layout Multi-level;Performs ADLs on one level;Able to live on main level with bedroom/bathroom;Stairs to enter with rails  (6 VIRIDIANA c HR)   Bathroom Shower/Tub Walk-in shower   Bathroom Toilet Standard   Bathroom Equipment Grab bars in shower;Grab bars around toilet   Bathroom Accessibility Accessible   Home Equipment Walker   Additional Comments pt wishing to receive a shower chair   Prior Function   Level of [place] Independent with ADLs;Independent with functional mobility;Needs assistance with IADLS   Lives With Friend(s)   Receives Help From Friend(s)   IADLs Family/Friend/Other provides transportation   Falls in the last 6 months 1 to 4   Vocational Unemployed   General   Family/Caregiver Present No   Cognition   Overall Cognitive Status WFL   Arousal/Participation Alert   Orientation Level Oriented X4   Following Commands Follows all commands and directions without difficulty   Subjective   Subjective \"I like my " "walker with the wheels better. It has breaks\"   RLE Assessment   RLE Assessment X  (4-/5 grossly)   LLE Assessment   LLE Assessment X  (4-/5 grossly)   Bed Mobility   Supine to Sit 3  Moderate assistance   Additional items Assist x 2;Increased time required;Verbal cues   Sit to Supine   (pt OOB at end of session)   Transfers   Sit to Stand 3 Moderate assistance   Additional items Assist x 2;Increased time required;Verbal cues   Stand to Sit 3 Moderate assistance   Additional items Assist x 2;Increased time required;Verbal cues   Additional Comments RW used   Ambulation/Elevation   Gait pattern Excessively slow;Short stride;Foward flexed;Decreased foot clearance;Ataxia;Improper Weight shift   Gait Assistance 4  Minimal assist   Additional items Assist x 2;Verbal cues   Assistive Device Rolling walker   Distance 5'   Balance   Static Sitting Fair +   Dynamic Sitting Fair +   Static Standing Fair -   Dynamic Standing Fair -   Ambulatory Poor +  (with RW)   Endurance Deficit   Endurance Deficit Yes   Endurance Deficit Description pt appears fatigued with minimal ambulation   Activity Tolerance   Activity Tolerance Patient limited by fatigue   Assessment   Prognosis Good   Problem List Decreased strength;Decreased endurance;Impaired balance;Decreased mobility;Decreased safety awareness   Assessment Patient is a 49 y.o. male evaluated by Physical Therapy s/p admit to [facility] on 2/7/2025 with admitting diagnosis of: Rhabdomyolysis, Hyperbilirubinemia, Alcohol abuse, Hyponatremia, Weakness, Elevated LFTs, GUNJAN (acute kidney injury), Acute diverticulitis. PT was consulted to assess patient's functional mobility and discharge needs. Ordered are PT Evaluation and treatment. Comorbidities affecting patient's physical performance at time of assessment include:  GERD, ARF, acute tubular necrosis, hx of CVA, hx of TIA . Personal factors affecting the patient at time of IE include: lives in 2 story home, ambulating " with assistive device, step(s) to enter home, inability to navigate community distances, history of fall(s), impaired safety awareness, inability/difficulty performing IADLs, and inability/difficulty performing ADLs. Please locate objective findings from PT assessment regarding body systems outlined above. Upon evaluation, pt requiring min-modA x 2, RW, and increased time to perform all functional mobility. Pt able to ambulate ~5' with RW before taking impulsive seated rest break. Moderate postural sway demonstrated but no true LOB experienced. Pt pleased with his performance, as he was not able to stand yesterday. HR and SpO2 remained WFL on RA throughout. The patient's AM-PAC Basic Mobility Inpatient Short Form Raw Score is 11. A Raw score of less than or equal to 16 suggests the patient may benefit from discharge to post-acute rehabilitation services. Please also refer to the recommendation of the Physical Therapist for safe discharge planning. Co treatment with OT secondary to complex medical condition of pt, possible A of 2 required to achieve and maintain transitional movements, requiring the need of skilled therapeutic intervention of 2 therapists to achieve delivery of services. Pt will benefit from continued PT intervention during LOS to address current deficits, increase LOF, and facilitate safe d/c to next level of care when medically appropriate. D/c recommendation at this time is rehabilitation resource intensity level I.   Goals   Patient Goals to go home   LTG Expiration Date 02/24/25   Long Term Goal #1 Pt will participate in B LE strengthening exercises to facilitate improved functional activity tolerance. Pt will perform all functional transfers and bed mobility mod(I) with good safety awareness. Pt will ambulate 250' mod(I) with LRAD while maintaining good functional dynamic balance. Pt will ascend/descend 6 stairs with HR and SUP to reflect the ability to safely navigate the home.   Plan    Treatment/Interventions Functional transfer training;LE strengthening/ROM;Therapeutic exercise;Endurance training;Elevations;Bed mobility;Gait training   PT Frequency 3-5x/wk   Discharge Recommendation   Rehab Resource Intensity Level, PT I (Maximum Resource Intensity)   AM-PAC Basic Mobility Inpatient   Turning in Flat Bed Without Bedrails 2   Lying on Back to Sitting on Edge of Flat Bed Without Bedrails 2   Moving Bed to Chair 2   Standing Up From Chair Using Arms 2   Walk in Room 2   Climb 3-5 Stairs With Railing 1   Basic Mobility Inpatient Raw Score 11   Basic Mobility Standardized Score 30.25   [facility] Highest Level Of Mobility   -[facility] Goal 4: Move to chair/commode   -HLM Achieved 6: Walk 10 steps or more   End of Consult   Patient Position at End of Consult Bedside chair;Bed/Chair alarm activated;All needs within reach

## 2025-02-10 NOTE — PLAN OF CARE
Problem: PHYSICAL THERAPY ADULT  Goal: Performs mobility at highest level of function for planned discharge setting.  See evaluation for individualized goals.  Description: Treatment/Interventions: Functional transfer training, LE strengthening/ROM, Therapeutic exercise, Endurance training, Elevations, Bed mobility, Gait training          See flowsheet documentation for full assessment, interventions and recommendations.  Note: Prognosis: Good  Problem List: Decreased strength, Decreased endurance, Impaired balance, Decreased mobility, Decreased safety awareness  Assessment: Patient is a 49 y.o. male evaluated by Physical Therapy s/p admit to [facility] on 2/7/2025 with admitting diagnosis of: Rhabdomyolysis, Hyperbilirubinemia, Alcohol abuse, Hyponatremia, Weakness, Elevated LFTs, GUNJAN (acute kidney injury), Acute diverticulitis. PT was consulted to assess patient's functional mobility and discharge needs. Ordered are PT Evaluation and treatment. Comorbidities affecting patient's physical performance at time of assessment include:  GERD, ARF, acute tubular necrosis, hx of CVA, hx of TIA . Personal factors affecting the patient at time of IE include: lives in 2 story home, ambulating with assistive device, step(s) to enter home, inability to navigate community distances, history of fall(s), impaired safety awareness, inability/difficulty performing IADLs, and inability/difficulty performing ADLs. Please locate objective findings from PT assessment regarding body systems outlined above. Upon evaluation, pt requiring min-modA x 2, RW, and increased time to perform all functional mobility. Pt able to ambulate ~5' with RW before taking impulsive seated rest break. Moderate postural sway demonstrated but no true LOB experienced. Pt pleased with his performance, as he was not able to stand yesterday. HR and SpO2 remained WFL on RA throughout. The patient's AM-PAC Basic Mobility Inpatient Short Form Raw  Score is 11. A Raw score of less than or equal to 16 suggests the patient may benefit from discharge to post-acute rehabilitation services. Please also refer to the recommendation of the Physical Therapist for safe discharge planning. Co treatment with OT secondary to complex medical condition of pt, possible A of 2 required to achieve and maintain transitional movements, requiring the need of skilled therapeutic intervention of 2 therapists to achieve delivery of services. Pt will benefit from continued PT intervention during LOS to address current deficits, increase LOF, and facilitate safe d/c to next level of care when medically appropriate. D/c recommendation at this time is rehabilitation resource intensity level I.        Rehab Resource Intensity Level, PT: I (Maximum Resource Intensity)    See flowsheet documentation for full assessment.

## 2025-02-10 NOTE — PLAN OF CARE
Problem: RESPIRATORY - ADULT  Goal: Achieves optimal ventilation and oxygenation  Description: INTERVENTIONS:  - Assess for changes in respiratory status  - Assess for changes in mentation and behavior  - Position to facilitate oxygenation and minimize respiratory effort  - Oxygen administered by appropriate delivery if ordered  - Initiate smoking cessation education as indicated  - Encourage broncho-pulmonary hygiene including cough, deep breathe, Incentive Spirometry  - Assess the need for suctioning and aspirate as needed  - Assess and instruct to report SOB or any respiratory difficulty  - Respiratory Therapy support as indicated  Outcome: Progressing     Problem: SKIN/TISSUE INTEGRITY - ADULT  Goal: Skin Integrity remains intact(Skin Breakdown Prevention)  Description: Assess:  -Perform Tani assessment every 2  -Clean and moisturize skin every incont. Episode   -Inspect skin when repositioning, toileting, and assisting with ADLS  -Assess extremities for adequate circulation and sensation     Bed Management:  -Have minimal linens on bed & keep smooth, unwrinkled  -Change linens as needed when moist or perspiring  -Avoid sitting or lying in one position for more than 2 hours while in bed  -Keep HOB at 30 degrees     Toileting:  -Offer bedside commode  -Assess for incontinence every shift  -Use incontinent care products after each incontinent episode such as alejandra wipes    Activity:  -Mobilize patient 3 times a day  -Encourage activity and walks on unit  -Encourage or provide ROM exercises   -Turn and reposition patient every 2 Hours  -Use appropriate equipment to lift or move patient in bed  -Instruct/ Assist with weight shifting every 60 mins when out of bed in chair  -Consider limitation of chair time 6 hour intervals    Skin Care:  -Avoid use of baby powder, tape, friction and shearing, hot water or constrictive clothing  -Relieve pressure over bony prominences using foam wedges/pillows  -Do not massage red  bony areas    Outcome: Progressing     Problem: MUSCULOSKELETAL - ADULT  Goal: Maintain or return mobility to safest level of function  Description: INTERVENTIONS:  - Assess patient's ability to carry out ADLs; assess patient's baseline for ADL function and identify physical deficits which impact ability to perform ADLs (bathing, care of mouth/teeth, toileting, grooming, dressing, etc.)  - Assess/evaluate cause of self-care deficits   - Assess range of motion  - Assess patient's mobility  - Assess patient's need for assistive devices and provide as appropriate  - Encourage maximum independence but intervene and supervise when necessary  - Involve family in performance of ADLs  - Assess for home care needs following discharge   - Consider OT consult to assist with ADL evaluation and planning for discharge  - Provide patient education as appropriate  Outcome: Progressing     Problem: PAIN - ADULT  Goal: Verbalizes/displays adequate comfort level or baseline comfort level  Description: Interventions:  - Encourage patient to monitor pain and request assistance  - Assess pain using appropriate pain scale  - Administer analgesics based on type and severity of pain and evaluate response  - Implement non-pharmacological measures as appropriate and evaluate response  - Consider cultural and social influences on pain and pain management  - Notify physician/advanced practitioner if interventions unsuccessful or patient reports new pain  Outcome: Progressing     Problem: INFECTION - ADULT  Goal: Absence or prevention of progression during hospitalization  Description: INTERVENTIONS:  - Assess and monitor for signs and symptoms of infection  - Monitor lab/diagnostic results  - Monitor all insertion sites, i.e. indwelling lines, tubes, and drains  - Monitor endotracheal if appropriate and nasal secretions for changes in amount and color  - [place] appropriate cooling/warming therapies per order  - Administer medications as  ordered  - Instruct and encourage patient and family to use good hand hygiene technique  - Identify and instruct in appropriate isolation precautions for identified infection/condition  Outcome: Progressing     Problem: SAFETY ADULT  Goal: Patient will remain free of falls  Description: INTERVENTIONS:  - Educate patient/family on patient safety including physical limitations  - Instruct patient to call for assistance with activity   - Consult OT/PT to assist with strengthening/mobility   - Keep Call bell within reach  - Keep bed low and locked with side rails adjusted as appropriate  - Keep care items and personal belongings within reach  - Initiate and maintain comfort rounds  - Make Fall Risk Sign visible to staff  - Offer Toileting every 2 Hours, in advance of need  - Initiate/Maintain bed/chair alarm  - Obtain necessary fall risk management equipment: non skid socks  - Apply yellow socks and bracelet for high fall risk patients  - Consider moving patient to room near nurses station  Outcome: Progressing  Goal: Maintain or return to baseline ADL function  Description: INTERVENTIONS:  -  Assess patient's ability to carry out ADLs; assess patient's baseline for ADL function and identify physical deficits which impact ability to perform ADLs (bathing, care of mouth/teeth, toileting, grooming, dressing, etc.)  - Assess/evaluate cause of self-care deficits   - Assess range of motion  - Assess patient's mobility; develop plan if impaired  - Assess patient's need for assistive devices and provide as appropriate  - Encourage maximum independence but intervene and supervise when necessary  - Involve family in performance of ADLs  - Assess for home care needs following discharge   - Consider OT consult to assist with ADL evaluation and planning for discharge  - Provide patient education as appropriate  Outcome: Progressing  Goal: Maintains/Returns to pre admission functional level  Description: INTERVENTIONS:  - Perform  AM-PAC 6 Click Basic Mobility/ Daily Activity assessment daily.  - Set and communicate daily mobility goal to care team and patient/family/caregiver.   - Collaborate with rehabilitation services on mobility goals if consulted  - Perform Range of Motion 3 times a day.  - Reposition patient every 2 hours.  - Dangle patient 3 times a day  - Stand patient 3 times a day  - Ambulate patient 3 times a day  - Out of bed to chair 3 times a day   - Out of bed for meals 3 times a day  - Out of bed for toileting  - Record patient progress and toleration of activity level   Outcome: Progressing     Problem: DISCHARGE PLANNING  Goal: Discharge to home or other facility with appropriate resources  Description: INTERVENTIONS:  - Identify barriers to discharge w/patient and caregiver  - Arrange for needed discharge resources and transportation as appropriate  - Identify discharge learning needs (meds, wound care, etc.)  - Arrange for interpretive services to assist at discharge as needed  - Refer to Case Management Department for coordinating discharge planning if the patient needs post-hospital services based on physician/advanced practitioner order or complex needs related to functional status, cognitive ability, or social support system  Outcome: Progressing     Problem: Knowledge Deficit  Goal: Patient/family/caregiver demonstrates understanding of disease process, treatment plan, medications, and discharge instructions  Description: Complete learning assessment and assess knowledge base.  Interventions:  - Provide teaching at level of understanding  - Provide teaching via preferred learning methods  Outcome: Progressing

## 2025-02-10 NOTE — CASE MANAGEMENT
Case Management Progress Note    Patient name Emiliano Rodriguez  Location /404-01 MRN [id number]  : 1975 Date 2/10/2025       LOS (days): 3  Geometric Mean LOS (GMLOS) (days): 4.7  Days to GMLOS:1.3        OBJECTIVE:        Current admission status: Inpatient  Preferred Pharmacy:   [id number] [address] [address]  Phone: [phone] Fax: [phone]    Primary Care Provider: Emmett Jimenez DO    Primary Insurance: [employer]  Secondary Insurance:     PROGRESS NOTE:met with pt at bedside to discuss need for STR. Pt wanting to know if he can smoke at STR. I explained they are all no smoking. Pt unsure if he wants to go. Explained safe discharge plans and high risk of him falling. Pt verbalized understanding and is agreeable to referrals but would also like HHC as a back up. [place] referrals pending for STR and HHC.

## 2025-02-11 ENCOUNTER — APPOINTMENT (INPATIENT)
Dept: RADIOLOGY | Facility: HOSPITAL | Age: 50
DRG: 351 | End: 2025-02-11
Payer: COMMERCIAL

## 2025-02-11 PROBLEM — R29.898 WEAKNESS OF LEFT LOWER EXTREMITY: Status: ACTIVE | Noted: 2025-02-11

## 2025-02-11 LAB
ALBUMIN SERPL BCG-MCNC: 2 G/DL (ref 3.5–5)
ALP SERPL-CCNC: 111 U/L (ref 34–104)
ALT SERPL W P-5'-P-CCNC: 323 U/L (ref 7–52)
ANION GAP SERPL CALCULATED.3IONS-SCNC: 9 MMOL/L (ref 4–13)
AST SERPL W P-5'-P-CCNC: 683 U/L (ref 13–39)
BASOPHILS # BLD AUTO: 0.05 THOUSANDS/ΜL (ref 0–0.1)
BASOPHILS NFR BLD AUTO: 1 % (ref 0–1)
BILIRUB DIRECT SERPL-MCNC: 1.25 MG/DL (ref 0–0.2)
BILIRUB SERPL-MCNC: 2.27 MG/DL (ref 0.2–1)
BUN SERPL-MCNC: 36 MG/DL (ref 5–25)
CALCIUM SERPL-MCNC: 7.2 MG/DL (ref 8.4–10.2)
CHLORIDE SERPL-SCNC: 106 MMOL/L (ref 96–108)
CK SERPL-CCNC: ABNORMAL U/L (ref 39–308)
CO2 SERPL-SCNC: 20 MMOL/L (ref 21–32)
CREAT SERPL-MCNC: 4.64 MG/DL (ref 0.6–1.3)
EOSINOPHIL # BLD AUTO: 0.11 THOUSAND/ΜL (ref 0–0.61)
EOSINOPHIL NFR BLD AUTO: 1 % (ref 0–6)
ERYTHROCYTE [DISTWIDTH] IN BLOOD BY AUTOMATED COUNT: 14.9 % (ref 11.6–15.1)
GFR SERPL CREATININE-BSD FRML MDRD: 13 ML/MIN/1.73SQ M
GLUCOSE SERPL-MCNC: 81 MG/DL (ref 65–140)
HCT VFR BLD AUTO: 29.2 % (ref 36.5–49.3)
HGB BLD-MCNC: 9.6 G/DL (ref 12–17)
IMM GRANULOCYTES # BLD AUTO: 0.06 THOUSAND/UL (ref 0–0.2)
IMM GRANULOCYTES NFR BLD AUTO: 1 % (ref 0–2)
LYMPHOCYTES # BLD AUTO: 0.85 THOUSANDS/ΜL (ref 0.6–4.47)
LYMPHOCYTES NFR BLD AUTO: 10 % (ref 14–44)
MAGNESIUM SERPL-MCNC: 1.5 MG/DL (ref 1.9–2.7)
MCH RBC QN AUTO: 33.4 PG (ref 26.8–34.3)
MCHC RBC AUTO-ENTMCNC: 32.9 G/DL (ref 31.4–37.4)
MCV RBC AUTO: 102 FL (ref 82–98)
MONOCYTES # BLD AUTO: 0.8 THOUSAND/ΜL (ref 0.17–1.22)
MONOCYTES NFR BLD AUTO: 10 % (ref 4–12)
NEUTROPHILS # BLD AUTO: 6.5 THOUSANDS/ΜL (ref 1.85–7.62)
NEUTS SEG NFR BLD AUTO: 77 % (ref 43–75)
NRBC BLD AUTO-RTO: 0 /100 WBCS
PLATELET # BLD AUTO: 128 THOUSANDS/UL (ref 149–390)
PMV BLD AUTO: 11.6 FL (ref 8.9–12.7)
POTASSIUM SERPL-SCNC: 3.2 MMOL/L (ref 3.5–5.3)
PROT SERPL-MCNC: 4.9 G/DL (ref 6.4–8.4)
RBC # BLD AUTO: 2.87 MILLION/UL (ref 3.88–5.62)
SODIUM SERPL-SCNC: 135 MMOL/L (ref 135–147)
VIT B12 SERPL-MCNC: 570 PG/ML (ref 180–914)
WBC # BLD AUTO: 8.37 THOUSAND/UL (ref 4.31–10.16)

## 2025-02-11 PROCEDURE — 83735 ASSAY OF MAGNESIUM: CPT | Performed by: INTERNAL MEDICINE

## 2025-02-11 PROCEDURE — 99232 SBSQ HOSP IP/OBS MODERATE 35: CPT

## 2025-02-11 PROCEDURE — 82550 ASSAY OF CK (CPK): CPT | Performed by: FAMILY MEDICINE

## 2025-02-11 PROCEDURE — 80076 HEPATIC FUNCTION PANEL: CPT | Performed by: PHYSICIAN ASSISTANT

## 2025-02-11 PROCEDURE — 97530 THERAPEUTIC ACTIVITIES: CPT

## 2025-02-11 PROCEDURE — 73502 X-RAY EXAM HIP UNI 2-3 VIEWS: CPT

## 2025-02-11 PROCEDURE — 85025 COMPLETE CBC W/AUTO DIFF WBC: CPT | Performed by: INTERNAL MEDICINE

## 2025-02-11 PROCEDURE — 80048 BASIC METABOLIC PNL TOTAL CA: CPT | Performed by: INTERNAL MEDICINE

## 2025-02-11 PROCEDURE — 97110 THERAPEUTIC EXERCISES: CPT

## 2025-02-11 PROCEDURE — 99233 SBSQ HOSP IP/OBS HIGH 50: CPT | Performed by: INTERNAL MEDICINE

## 2025-02-11 PROCEDURE — 73552 X-RAY EXAM OF FEMUR 2/>: CPT

## 2025-02-11 RX ORDER — POTASSIUM CHLORIDE 14.9 MG/ML
20 INJECTION INTRAVENOUS ONCE
Status: COMPLETED | OUTPATIENT
Start: 2025-02-11 | End: 2025-02-11

## 2025-02-11 RX ORDER — MAGNESIUM SULFATE HEPTAHYDRATE 40 MG/ML
2 INJECTION, SOLUTION INTRAVENOUS ONCE
Status: COMPLETED | OUTPATIENT
Start: 2025-02-11 | End: 2025-02-11

## 2025-02-11 RX ORDER — POTASSIUM CHLORIDE 1500 MG/1
40 TABLET, EXTENDED RELEASE ORAL 2 TIMES DAILY
Status: COMPLETED | OUTPATIENT
Start: 2025-02-11 | End: 2025-02-12

## 2025-02-11 RX ADMIN — METOPROLOL SUCCINATE 25 MG: 25 TABLET, EXTENDED RELEASE ORAL at 08:33

## 2025-02-11 RX ADMIN — SODIUM CHLORIDE 100 ML/HR: 0.9 INJECTION, SOLUTION INTRAVENOUS at 13:56

## 2025-02-11 RX ADMIN — POTASSIUM CHLORIDE 20 MEQ: 14.9 INJECTION, SOLUTION INTRAVENOUS at 11:46

## 2025-02-11 RX ADMIN — CLOPIDOGREL 75 MG: 75 TABLET ORAL at 08:32

## 2025-02-11 RX ADMIN — Medication 250 MG: at 17:09

## 2025-02-11 RX ADMIN — POTASSIUM CHLORIDE 40 MEQ: 1500 TABLET, EXTENDED RELEASE ORAL at 08:33

## 2025-02-11 RX ADMIN — THIAMINE HYDROCHLORIDE 500 MG: 100 INJECTION, SOLUTION INTRAMUSCULAR; INTRAVENOUS at 13:51

## 2025-02-11 RX ADMIN — MAGNESIUM SULFATE HEPTAHYDRATE 2 G: 40 INJECTION, SOLUTION INTRAVENOUS at 09:42

## 2025-02-11 RX ADMIN — TRAZODONE HYDROCHLORIDE 50 MG: 50 TABLET ORAL at 22:28

## 2025-02-11 RX ADMIN — FOLIC ACID 1 MG: 1 TABLET ORAL at 08:32

## 2025-02-11 RX ADMIN — Medication 250 MG: at 08:33

## 2025-02-11 RX ADMIN — SODIUM CHLORIDE 100 ML/HR: 0.9 INJECTION, SOLUTION INTRAVENOUS at 01:07

## 2025-02-11 RX ADMIN — SODIUM BICARBONATE 75 ML/HR: 84 INJECTION, SOLUTION INTRAVENOUS at 08:58

## 2025-02-11 RX ADMIN — HEPARIN SODIUM 5000 UNITS: 5000 INJECTION, SOLUTION INTRAVENOUS; SUBCUTANEOUS at 13:51

## 2025-02-11 RX ADMIN — METRONIDAZOLE 500 MG: 500 TABLET ORAL at 06:15

## 2025-02-11 RX ADMIN — POTASSIUM CHLORIDE 40 MEQ: 1500 TABLET, EXTENDED RELEASE ORAL at 17:10

## 2025-02-11 RX ADMIN — HEPARIN SODIUM 5000 UNITS: 5000 INJECTION, SOLUTION INTRAVENOUS; SUBCUTANEOUS at 22:28

## 2025-02-11 RX ADMIN — THIAMINE HYDROCHLORIDE 500 MG: 100 INJECTION, SOLUTION INTRAMUSCULAR; INTRAVENOUS at 22:28

## 2025-02-11 RX ADMIN — NICOTINE 1 PATCH: 21 PATCH, EXTENDED RELEASE TRANSDERMAL at 08:36

## 2025-02-11 RX ADMIN — PANTOPRAZOLE SODIUM 40 MG: 40 TABLET, DELAYED RELEASE ORAL at 06:15

## 2025-02-11 RX ADMIN — THIAMINE HYDROCHLORIDE 500 MG: 100 INJECTION, SOLUTION INTRAMUSCULAR; INTRAVENOUS at 08:33

## 2025-02-11 NOTE — ASSESSMENT & PLAN NOTE
Continue Regimen: Efudex topical cream BID for 2 weeks to forehead. Suspected component of ATN, nephrology consult appreciated.   Detail Level: Zone

## 2025-02-11 NOTE — CASE MANAGEMENT
"   Case Management Progress Note    Patient name Emiliano Rodriguez  Location /404-01 MRN [id number]  : 1975 Date 2025       LOS (days): 4  Geometric Mean LOS (GMLOS) (days): 4.7  Days to GMLOS:0.4        OBJECTIVE:        Current admission status: Inpatient  Preferred Pharmacy:   [id number] [address] [address]  Phone: [phone] Fax: [phone]    Primary Care Provider: Emmett Jimenez DO    Primary Insurance: [employer]  Secondary Insurance:     PROGRESS NOTE:met with pt at bedside. Pt not agreeable to STR at this time. Explained that its recommended based on him not ambulating. Pt states \"I will be fine to get it at my house and my friend will help me\". Pt was accepted by [facility]. Tentative discharge few days. May need ride home on discharge. CM following.           "

## 2025-02-11 NOTE — PROGRESS NOTES
Progress Note - Hospitalist   Name: Emiliano Rodriguez 49 y.o. male I MRN: [id number]  Unit/Bed#: 404-01 I Date of Admission: 2/7/2025   Date of Service: 2/11/2025 I Hospital Day: 4    Assessment & Plan  Rhabdomyolysis  CK level improving, continue IV fluid, check a.m. CPK  Acute renal failure (ARF) (HCC)  Likely due to prerenal azotemia and rhabdomyolysis, IV fluid management per nephrology    Suspected component of ATN, appreciate nephrology input  Hyponatremia  Sodium level today is within normal limits, continue to monitor daily  Acute diverticulitis  Patient completed 5 days of antibiotics, will discontinue antibiotics and monitor symptoms.  Plan for outpatient colonoscopy in 6 to 8 weeks    Diarrhea  Patient with severe diarrhea , will utilize rectal tube   Start banana flakes and Probiotics  C.diff Negative  Enteric Panel negative     Weakness of left lower extremity  Patient still with significant ambulatory dysfunction and reported left lower extremity weakness and pain.    Check plain films left hip, check left femur plain films.    Patient with focal weakness, history of CVA, check MRI of the brain without contrast to rule out subacute CVA.  Abnormal LFTs  Patient with hyperbilirbuinemia and elevated LFT in the setting of rhabdomyolysis and ETOH use    Likely multifactorial including alcohol use, fatty liver disease, rhabdomyolysis.    Gastroesophageal reflux disease without esophagitis  Protonix 40 mg daily    Outpatient EGD per GI  Alcohol abuse  Endorses at least 5 beers a day, although I suspect this is understated  Folic acid, thiamine  Initiate CIWA protocol  Hypokalemia  Monitor potassium level daily  Volume depletion  Continue IV fluid, encourage p.o. intake  Acute tubular necrosis (HCC)  Suspected component of ATN, nephrology consult appreciated.    Code Status: Level 1 - Full Code    Subjective   Patient still complaining of left hip pain, left lower extremity weakness    Objective :  Temp:  [97.9  °F (36.6 °C)-98.3 °F (36.8 °C)] 97.9 °F (36.6 °C)  HR:  [74-86] 86  BP: (123-140)/() 125/94  Resp:  [17-22] 17  SpO2:  [95 %-98 %] 95 %  O2 Device: None (Room air)    Body mass index is 32.35 kg/m².     Input and Output Summary (last 24 hours):     Intake/Output Summary (Last 24 hours) at 2/11/2025 1325  Last data filed at 2/11/2025 1322  Gross per 24 hour   Intake 3285 ml   Output 3800 ml   Net -515 ml       Physical Exam  Vitals and nursing note reviewed.   Constitutional:       General: He is not in acute distress.     Appearance: He is not ill-appearing, toxic-appearing or diaphoretic.   HENT:      Head: Normocephalic.      Nose: Nose normal.   Cardiovascular:      Rate and Rhythm: Normal rate.      Pulses: Normal pulses.   Pulmonary:      Effort: Pulmonary effort is normal. No respiratory distress.      Breath sounds: Normal breath sounds. No wheezing.   Abdominal:      General: Abdomen is flat. Bowel sounds are normal. There is no distension.      Palpations: Abdomen is soft.      Tenderness: There is no abdominal tenderness.   Musculoskeletal:         General: Normal range of motion.      Cervical back: Normal range of motion.   Skin:     General: Skin is warm.   Neurological:      Mental Status: He is alert and oriented to person, place, and time.      Comments: Muscle shank 5 out of 5 bilateral upper extremities    Patient has bilateral lower extremity weakness, decreased strength left lower extremity.   Psychiatric:         Mood and Affect: Mood normal.         Behavior: Behavior normal.         Thought Content: Thought content normal.         Judgment: Judgment normal.           Lines/Drains:  Lines/Drains/Airways       Active Status       Name Placement date Placement time Site Days    Rectal Tube 02/08/25  1018  --  3                            Lab Results: I have reviewed the following results:   Results from last 7 days   Lab Units 02/11/25  0420   WBC Thousand/uL 8.37   HEMOGLOBIN g/dL 9.6*    HEMATOCRIT % 29.2*   PLATELETS Thousands/uL 128*   SEGS PCT % 77*   LYMPHO PCT % 10*   MONO PCT % 10   EOS PCT % 1     Results from last 7 days   Lab Units 02/11/25  0420   SODIUM mmol/L 135   POTASSIUM mmol/L 3.2*   CHLORIDE mmol/L 106   CO2 mmol/L 20*   BUN mg/dL 36*   CREATININE mg/dL 4.64*   ANION GAP mmol/L 9   CALCIUM mg/dL 7.2*   ALBUMIN g/dL 2.0*   TOTAL BILIRUBIN mg/dL 2.27*   ALK PHOS U/L 111*   ALT U/L 323*   AST U/L 683*   GLUCOSE RANDOM mg/dL 81                 Results from last 7 days   Lab Units 02/07/25  0453   LACTIC ACID mmol/L 1.3   PROCALCITONIN ng/ml 3.09*       Recent Cultures (last 7 days):   Results from last 7 days   Lab Units 02/07/25  0906   C DIFF TOXIN B BY PCR  Negative       Imaging Results Review: No pertinent imaging studies reviewed.  Other Study Results Review: No additional pertinent studies reviewed.    Last 24 Hours Medication List:     Current Facility-Administered Medications:     acetaminophen (TYLENOL) tablet 650 mg, Q6H PRN    aluminum-magnesium hydroxide-simethicone (MAALOX) oral suspension 30 mL, Q4H PRN    clopidogrel (PLAVIX) tablet 75 mg, Daily    folic acid (FOLVITE) tablet 1 mg, Daily    heparin (porcine) subcutaneous injection 5,000 Units, Q8H DAVION **AND** [COMPLETED] Platelet count, Once    metoprolol succinate (TOPROL-XL) 24 hr tablet 25 mg, Daily    nicotine (NICODERM CQ) 21 mg/24 hr TD 24 hr patch 1 patch, Daily    pantoprazole (PROTONIX) EC tablet 40 mg, Early Morning    potassium chloride (Klor-Con M20) CR tablet 40 mEq, BID    potassium chloride 20 mEq IVPB (premix), Once, Last Rate: 20 mEq (02/11/25 1146)    saccharomyces boulardii (FLORASTOR) capsule 250 mg, BID    sodium bicarbonate 150 mEq in dextrose 5 % 1,000 mL infusion, Continuous, Last Rate: 75 mL/hr (02/11/25 0858)    sodium chloride 0.9 % infusion, Continuous, Last Rate: 100 mL/hr (02/11/25 0107)    thiamine (VITAMIN B1) 500 mg in sodium chloride 0.9 % 50 mL IVPB, Q8H DAVION, Last Rate: Stopped  (02/11/25 0943)    traZODone (DESYREL) tablet 50 mg, HS PRN    Administrative Statements   Today, Patient Was Seen By: Isaías Eller DO      **Please Note: This note may have been constructed using a voice recognition system.**

## 2025-02-11 NOTE — PROGRESS NOTES
Patient:    MRN:  [id number]    Adamin Request ID:  [id number]    Level of care reserved:  Home Health Agency    Partner Reserved:  [facility] [address] [place] [address] [phone]    Clinical needs requested:    Geography searched:  [phone]    Start of Service:    Request sent:  4:23pm EST on 2/10/2025 by Yoli Velasquez    Partner reserved:  10:50am EST on 2/11/2025 by Yoli Velasquez    Choice list shared:  10:50am EST on 2/11/2025 by Yoli Velasquez

## 2025-02-11 NOTE — PHYSICAL THERAPY NOTE
"                                                                                  PHYSICAL THERAPY NOTE          Patient Name: Emiliano Rodriguez  Today's Date: 2/11/2025 02/11/25 0930   PT Last Visit   PT Visit Date 02/11/25   Note Type   Note Type Treatment   Pain Assessment   Pain Assessment Tool FLACC   Pain Rating: FLACC (Rest) - Face 1   Pain Rating: FLACC (Rest) - Legs 1   Pain Rating: FLACC (Rest) - Activity 1   Pain Rating: FLACC (Rest) - Cry 0   Pain Rating: FLACC (Rest) - Consolability 0   Score: FLACC (Rest) 3   Pain Rating: FLACC (Activity) - Face 2   Pain Rating: FLACC (Activity) - Legs 1   Pain Rating: FLACC (Activity) - Activity 1   Pain Rating: FLACC (Activity) - Cry 1   Pain Rating: FLACC (Activity) - Consolability 0   Score: FLACC (Activity) 5   Restrictions/Precautions   Weight Bearing Precautions Per Order No   Other Precautions Impulsive;Chair Alarm;Bed Alarm;Fall Risk;Multiple lines  (rectal tube)   General   Chart Reviewed Yes   Family/Caregiver Present No   Cognition   Overall Cognitive Status WFL   Arousal/Participation Alert;Responsive   Attention Within functional limits   Orientation Level Oriented X4   Memory Within functional limits   Following Commands Follows one step commands without difficulty   Subjective   Subjective c/o B LE pain\"It feels like my calf muscles ar ripping when I stand\" Declined sitting OOB in recliner.  (Encouraged participation)   Bed Mobility   Rolling L 3  Moderate assistance   Additional items Assist x 1;HOB elevated;Bedrails;Increased time required;Verbal cues;LE management   Supine to Sit 3  Moderate assistance   Additional items Assist x 2;Bedrails;Increased time required;Verbal cues;LE management   Sit to Supine 3  Moderate assistance   Additional items Assist x 2;Increased time required;Verbal cues;LE management   Additional Comments Increased time to transition to EOB. Sat EOB with fair sitting balance.   Transfers   Sit to Stand 3  Moderate assistance "   Additional items Assist x 2;Increased time required;Verbal cues   Stand to Sit 3  Moderate assistance   Additional items Assist x 2;Verbal cues   Additional Comments Stood with RW ~ 3 min at EOB for hygiene. Declined ambulation and sitting OOB in recliner due to pain, therefore returned to supine postion.   Ambulation/Elevation   Gait pattern Not tested   Balance   Static Sitting Fair   Dynamic Sitting Fair   Static Standing Fair -   Dynamic Standing Fair -  (RW)   Endurance Deficit   Endurance Deficit Yes   Activity Tolerance   Activity Tolerance Patient limited by fatigue;Patient limited by pain   Exercises   Heelslides Supine;10 reps;AAROM;Bilateral   Hip Abduction Supine;10 reps;AAROM;Bilateral   Hip Adduction Supine;10 reps;AAROM;Bilateral   Ankle Pumps Supine;20 reps;AROM;Bilateral   Assessment   Prognosis Good   Problem List Decreased strength;Decreased endurance;Impaired balance;Decreased mobility;Decreased safety awareness   Assessment Pt seen this date for PT treatment session to increase level of mobility and functional activity tolerance. PT treatment session consisting of  therapeutic exercises, bed mobility and transfers. Pt. Currently performing bed mobility, tx at  min- mod  x 1-  2 level of function with use of RW .  Stood at EOB ~ 3 min with fair- balance and min A x 1. Declined ambulation due to LE pain. In comparison to previous session, Pt. With no change  activity tolerance. Pt is in need of continued activity in PT to improve strength balance endurance mobility transfers and ambulation with return to maximize LOF. From PT/mobility standpoint, recommendation at time of d/c would be Level I:  maximum resource intensity  in order to promote return to PLOF and independence.  The patient's AM-[facility] Basic Mobility Inpatient Short Form Raw Score is 11. A Raw score of less than or equal to 16 suggests the patient may benefit from discharge to post-acute rehabilitation services. Please also refer to  the recommendation of the Physical Therapist for safe discharge planning. Pt continues to be functioning below baseline level. PT will continue to see pt during current hospitalization in order to address the deficits listed above and provide interventions consistent w/ POC in effort to achieve goals.   Goals   Patient Goals to get better   LTG Expiration Date 02/24/25   PT Treatment Day 1   Plan   Treatment/Interventions Functional transfer training;LE strengthening/ROM;Therapeutic exercise;Endurance training;Elevations;Bed mobility;Gait training   Progress Slow progress, decreased activity tolerance   PT Frequency 3-5x/wk   Discharge Recommendation   Rehab Resource Intensity Level, PT I (Maximum Resource Intensity)   AM-PAC Basic Mobility Inpatient   Turning in Flat Bed Without Bedrails 3   Lying on Back to Sitting on Edge of Flat Bed Without Bedrails 2   Moving Bed to Chair 2   Standing Up From Chair Using Arms 2   Walk in Room 1   Climb 3-5 Stairs With Railing 1   Basic Mobility Inpatient Raw Score 11   Basic Mobility Standardized Score 30.25   [facility] Highest Level Of Mobility   -[facility] Goal 4: Move to chair/commode   -[facility] Achieved 5: Stand (1 or more minutes)   Education   Education Provided Mobility training   Patient Demonstrates verbal understanding;Reinforcement needed   End of Consult   Patient Position at End of Consult Supine;Bed/Chair alarm activated;All needs within reach   End of Consult Comments discussed POC with PT

## 2025-02-11 NOTE — PROGRESS NOTES
Progress Note - Gastroenterology   Name: Emiliano Rodriguez 49 y.o. male I MRN: [id number]  Unit/Bed#: 404-01 I Date of Admission: 2/7/2025   Date of Service: 2/11/2025 I Hospital Day: 4    Assessment & Plan  Abnormal LFTs  Elevation in LFTs is likely multifactorial, suspect in relation to EtOH abuse, hepatic steatosis, as well as secondary to rhabdomyolysis.  US with steatosis and patent hepatic vessels.   Serologic investigation was negative for autoimmune and genetic causes of elevated transaminases, ceruloplasmin was low, which can be seen in multitude of disorders, though at some point patient should have a 24-hour urine copper checked, we ultimately agreed to defer for now given his significant GUNJAN.    As previously discussed, there may be a component of alcoholic hepatitis, though discriminant function was not yet checked given no updated PTT.     Continue to trend LFTs at this time.   Continue to avoid hepatotoxins.   Continue with complete etoh cessation.     Continue current diet as ordered.   Continue to maintain a large bore IV.  Continue to monitor hemoglobin and transfuse as per protocol.   Continue with serial abdominal exams.   Continue to monitor stool output for any overt signs of GI bleeding.   Alcohol abuse  Strongly encourage complete cessation.   Rhabdomyolysis  Per primary team.   CK is improving.   Diarrhea  Stool studies negative for infection. Fecal elastase wnl.   Fecal calprotectin elevated at 380.   Did have diverticulitis in 12/2024.   Was originally on rocephin/flagyl, this has been d/c.    Continue Banatrol supplement. Continue probiotics.   Could cautiously trial anti-diarrheal if needed.   D/c rectal tube as soon as possible.   Acute diverticulitis  Pt with bout of acute diverticulitis in 12/2024.   CT at time of current admission with read of diverticulitis, though pt without abd pain.   Was treated for 4 days with ceftriaxone and Flagyl, this has since been discontinued.  Outpatient  colonoscopy in 6 to 8 weeks.  Gastroesophageal reflux disease without esophagitis  Continue PPI as ordered.   Continue PRN antiemetics as ordered.   Acute renal failure (ARF) (HCC)  Per primary team and Nephrology.   Likely due to prerenal azotemia and rhabdomyolysis.   Hyponatremia  As per primary team.    I have discussed the above management plan in detail with the primary service.     Subjective     50 y/o M w/ pmhx sig for GERD, TIA on Plavix, alcohol use disorder, tobacco use, episode of diverticulitis in 12/2024.    Presented to hospital with worsening lower extremity weakness for a few weeks.  GI was consulted for elevated LFTs, loose stools, diverticulitis.  Patient was noted to have elevated transaminases, GUNJAN, marked elevation in CK.  CT with hepatic steatosis, colonic diverticulosis with thickening of the mid sigmoid and mild pericolonic inflammatory stranding.    Patient had stool studies checked which were negative for infection and showed an elevated fecal calprotectin. He was started on antibiotic therapy.  He had rectal tube placed given urgency and incontinence.    Interval events:     Pt denies any significant abd pain. Appetite is poor. Notes discomfort from rectal tube. No BRBPR or melena. Nursing notes indicate 3 BM in past 24 hours.     Objective :  Temp:  [97.6 °F (36.4 °C)-98.3 °F (36.8 °C)] 97.9 °F (36.6 °C)  HR:  [74-86] 86  BP: (122-140)/() 125/94  Resp:  [17-22] 17  SpO2:  [94 %-98 %] 95 %  O2 Device: None (Room air)    Physical Exam  Vitals and nursing note reviewed.   Constitutional:       General: He is not in acute distress.     Appearance: He is well-developed.      Comments: Appears older than stated age   HENT:      Head: Normocephalic and atraumatic.   Eyes:      General: No scleral icterus.     Conjunctiva/sclera: Conjunctivae normal.   Cardiovascular:      Rate and Rhythm: Normal rate and regular rhythm.      Heart sounds: No murmur heard.  Pulmonary:      Effort:  Pulmonary effort is normal. No respiratory distress.   Abdominal:      General: Abdomen is protuberant. Bowel sounds are normal. There is no distension.      Palpations: Abdomen is soft.      Tenderness: There is no abdominal tenderness. There is no guarding or rebound.   Musculoskeletal:      Cervical back: Neck supple.   Skin:     General: Skin is warm and dry.      Coloration: Skin is not jaundiced.   Neurological:      General: No focal deficit present.      Mental Status: He is alert.      Comments: No asterixis    Psychiatric:         Mood and Affect: Mood normal.         Behavior: Behavior normal.         Lab Results: I have reviewed the following results:CBC/BMP:   .     02/11/25  0420   WBC 8.37   HGB 9.6*   HCT 29.2*   *   SODIUM 135   K 3.2*      CO2 20*   BUN 36*   CREATININE 4.64*   GLUC 81   MG 1.5*    , Creatinine Clearance: Estimated Creatinine Clearance: 20.3 mL/min (A) (by C-G formula based on SCr of 4.64 mg/dL (H))., LFTs: No new results in last 24 hours. , PTT/INR:No new results in last 24 hours.     Imaging Results Review: I reviewed radiology reports from this admission including: CT abdomen/pelvis.  Other Study Results Review: No additional pertinent studies reviewed.    **Please note:  Dictation voice to text software may have been used in the creation of this record.  Occasional wrong word or “sound alike” substitutions may have occurred due to the inherent limitations of voice recognition software.  Read the chart carefully and recognize, using context, where substitutions have occurred.**

## 2025-02-11 NOTE — ASSESSMENT & PLAN NOTE
Patient completed 5 days of antibiotics, will discontinue antibiotics and monitor symptoms.  Plan for outpatient colonoscopy in 6 to 8 weeks

## 2025-02-11 NOTE — ASSESSMENT & PLAN NOTE
Elevation in LFTs is likely multifactorial, suspect in relation to EtOH abuse, hepatic steatosis, as well as secondary to rhabdomyolysis.  US with steatosis and patent hepatic vessels.   Serologic investigation was negative for autoimmune and genetic causes of elevated transaminases, ceruloplasmin was low, which can be seen in multitude of disorders, though at some point patient should have a 24-hour urine copper checked, we ultimately agreed to defer for now given his significant GUNJAN.    As previously discussed, there may be a component of alcoholic hepatitis, though discriminant function was not yet checked given no updated PTT.     Continue to trend LFTs at this time.   Continue to avoid hepatotoxins.   Continue with complete etoh cessation.     Continue current diet as ordered.   Continue to maintain a large bore IV.  Continue to monitor hemoglobin and transfuse as per protocol.   Continue with serial abdominal exams.   Continue to monitor stool output for any overt signs of GI bleeding.

## 2025-02-11 NOTE — ASSESSMENT & PLAN NOTE
Potassium 3.2 mmol/L, 2/11. Ordered Potassium Chloride 20 mEq x1 IV and potassium 40 mEq x 3 doses. Recheck AM labs.

## 2025-02-11 NOTE — ASSESSMENT & PLAN NOTE
Pt with bout of acute diverticulitis in 12/2024.   CT at time of current admission with read of diverticulitis, though pt without abd pain.   Was treated for 4 days with ceftriaxone and Flagyl, this has since been discontinued.  Outpatient colonoscopy in 6 to 8 weeks.

## 2025-02-11 NOTE — PROGRESS NOTES
Progress Note - Nephrology   Name: Emiliano Rodriguez 49 y.o. male I MRN: [id number]  Unit/Bed#: 404-01 I Date of Admission: 2/7/2025   Date of Service: 2/11/2025 I Hospital Day: 4    Assessment & Plan  Acute renal failure (ARF) (HCC)  Continue supportive care, as noted previously, patient most likely acute tubular necrosis but has not been keeping up with his fluids with probable substantial fluid losses still ongoing.  Continue to optimize care and avoid potential for nephrotoxins.  1.9 L UOP 2/10 in addition to 900 mL stool output.  Continue  ml/Hr and maintain bicarbonate 150 mEq at 75 ml/hr, please refer below for details.    Acute tubular necrosis (HCC)  Continue supportive care at this time, repeat urine studies confirm ATN.  Maintain appropriate volume and nutritional status.  Hyponatremia  Sodium level improving 135 mmol/L 2/11. Increased from 130 mmol/L on 2/7. Continue  ml/hr.   Hypokalemia  Potassium 3.2 mmol/L, 2/11. Ordered Potassium Chloride 20 mEq x1 IV and potassium 40 mEq x 3 doses. Recheck AM labs.  Volume depletion  Has significantly improved, continue NSS to 100 ml/hr.   Diarrhea  Continue with current management according to our hospitalist colleagues. Zazzy rectal tube in place.   Rhabdomyolysis  CK trending down 11,511 on 2/11, decreased from >20,000 on 2/9.  Continue with IV fluids as noted above.  Phosphorus 3.0 mg/dL, Magnesium 1.5 mg/dL on 2/11.  2 g magnesium sulfate ordered 2/11.  Continue to replete phosphorus and magnesium levels aggressively.  Gastroesophageal reflux disease without esophagitis  Continue with PPI, potential endoscopy going forward  Alcohol abuse  Continue [facility] protocol, further care and management according to hospitalist recommendations.  Abnormal LFTs  Potentially related to alcohol intake, will be following with gastroenterology.  Acute diverticulitis  Day 4 of ceftriaxone and metronidazole    I have reviewed the nephrology recommendations including  creatinine and CK trends, volume status, with hospitalist, and we are in agreement with renal plan including the information outlined above.     Subjective   Brief History of Admission -  49 y.o male with PMH of alcohol use and esophageal reflux who presents with weakness to the bilateral lower extremities. States he had been in his recliner at home for 2-3 days. Endorses drinking about 5 beers per day and smoking 1.5 packs of cigarettes daily. Nephrology is following for GUNJAN, rhabdomyolysis, and hyponatremia.      Patient is examined resting in bed, lunch tray is at bedside however patient states he is not hungry at this time.  Urinal with approximately 400 mL dark yellow clear urine at bedside.  Patient states he still feels weak today but is feeling somewhat better than yesterday.      Objective :  Temp:  [97.9 °F (36.6 °C)-98.3 °F (36.8 °C)] 97.9 °F (36.6 °C)  HR:  [74-86] 86  BP: (123-140)/() 125/94  Resp:  [17-22] 17  SpO2:  [95 %-98 %] 95 %  O2 Device: None (Room air)    Current Weight: Weight - Scale: 90.9 kg (200 lb 6.4 oz)  First Weight: Weight - Scale: 90.3 kg (199 lb 1.2 oz)  I/O         02/09 0701  02/10 0700 02/10 0701  02/11 0700 02/11 0701  02/12 0700    P.O. 720 1140 300    I.V. (mL/kg) 980 (10.8) 3190 (35.1)     Total Intake(mL/kg) 1700 (18.7) 4330 (47.6) 300 (3.3)    Urine (mL/kg/hr) 1650 (0.8) 1900 (0.9) 150 (0.7)    Stool 500 900 0    Total Output 2150 2800 150    Net -450 +1530 +150           Unmeasured Stool Occurrence 800 x  1 x          Physical Exam  Vitals and nursing note reviewed.   Constitutional:       General: He is not in acute distress.     Appearance: He is well-developed. He is obese. He is ill-appearing.   HENT:      Head: Normocephalic and atraumatic.      Right Ear: External ear normal.      Left Ear: External ear normal.      Nose: Nose normal.      Mouth/Throat:      Mouth: Mucous membranes are moist.      Pharynx: Oropharynx is clear.   Eyes:      Extraocular  Movements: Extraocular movements intact.      Conjunctiva/sclera: Conjunctivae normal.   Cardiovascular:      Rate and Rhythm: Normal rate and regular rhythm.      Heart sounds: Normal heart sounds. No murmur heard.  Pulmonary:      Effort: Pulmonary effort is normal. No respiratory distress.      Breath sounds: Normal breath sounds.      Comments: Decreased breath sounds  Abdominal:      Palpations: Abdomen is soft.      Tenderness: There is no abdominal tenderness.   Musculoskeletal:         General: No swelling.      Cervical back: Neck supple.      Right lower leg: No edema.      Left lower leg: No edema.   Skin:     General: Skin is warm and dry.      Capillary Refill: Capillary refill takes less than 2 seconds.      Coloration: Skin is jaundiced.   Neurological:      General: No focal deficit present.      Mental Status: He is alert and oriented to person, place, and time.   Psychiatric:         Mood and Affect: Mood normal.         Behavior: Behavior normal.       Medications:    Current Facility-Administered Medications:     acetaminophen (TYLENOL) tablet 650 mg, 650 mg, Oral, Q6H PRN, Prudencio Nix MD, 650 mg at 02/10/25 1126    aluminum-magnesium hydroxide-simethicone (MAALOX) oral suspension 30 mL, 30 mL, Oral, Q4H PRN, Dominique Farris MD, 30 mL at 02/09/25 2102    clopidogrel (PLAVIX) tablet 75 mg, 75 mg, Oral, Daily, Ankita Katz MD, 75 mg at 02/11/25 0832    folic acid (FOLVITE) tablet 1 mg, 1 mg, Oral, Daily, Ankita Katz MD, 1 mg at 02/11/25 0832    heparin (porcine) subcutaneous injection 5,000 Units, 5,000 Units, Subcutaneous, Q8H DAVION, 5,000 Units at 02/10/25 1416 **AND** [COMPLETED] Platelet count, , , Once, Ankita Katz MD    magnesium sulfate 2 g/50 mL IVPB (premix) 2 g, 2 g, Intravenous, Once, Isaías Eller DO    metoprolol succinate (TOPROL-XL) 24 hr tablet 25 mg, 25 mg, Oral, Daily, Ankita Katz MD, 25 mg at 02/11/25 0833    nicotine (NICODERM CQ) 21 mg/24 hr TD 24  hr patch 1 patch, 1 patch, Transdermal, Daily, Ankita Katz MD, 1 patch at 02/11/25 0836    pantoprazole (PROTONIX) EC tablet 40 mg, 40 mg, Oral, Early Morning, Ankita Katz MD, 40 mg at 02/11/25 0615    potassium chloride (Klor-Con M20) CR tablet 40 mEq, 40 mEq, Oral, BID, LAURIE Vitale, 40 mEq at 02/11/25 0833    saccharomyces boulardii (FLORASTOR) capsule 250 mg, 250 mg, Oral, BID, Ankita Katz MD, 250 mg at 02/11/25 0833    sodium bicarbonate 150 mEq in dextrose 5 % 1,000 mL infusion, 75 mL/hr, Intravenous, Continuous, Luciano Guadarrama DO, Last Rate: 75 mL/hr at 02/11/25 0858, 75 mL/hr at 02/11/25 0858    sodium chloride 0.9 % infusion, 100 mL/hr, Intravenous, Continuous, LAURIE Vitale, Last Rate: 100 mL/hr at 02/11/25 0107, 100 mL/hr at 02/11/25 0107    thiamine (VITAMIN B1) 500 mg in sodium chloride 0.9 % 50 mL IVPB, 500 mg, Intravenous, Q8H [facility], Isaías Eller DO, Last Rate: 100 mL/hr at 02/11/25 0833, 500 mg at 02/11/25 0833    traZODone (DESYREL) tablet 50 mg, 50 mg, Oral, HS PRN, Garcia Escobar MD, 50 mg at 02/10/25 2211      Lab Results: I have reviewed the following results:  Results from last 7 days   Lab Units 02/11/25  0420 02/10/25  0406 02/09/25  0546 02/08/25  0903 02/08/25  0437 02/07/25  2355 02/07/25 2001 02/07/25  0813 02/07/25  0725 02/07/25  0547 02/07/25  0453   WBC Thousand/uL 8.37 9.56 8.39  --  6.95  --   --   --   --   --  9.64   HEMOGLOBIN g/dL 9.6* 10.0* 10.6*  --  10.8*  --   --   --   --   --  13.0   HEMATOCRIT % 29.2* 31.4* 33.2*  --  32.8*  --   --   --   --   --  38.1   PLATELETS Thousands/uL 128* 119* 118*  --  108*  --   --   --  105*  --  141*   POTASSIUM mmol/L 3.2* 3.4* 4.0 3.6 3.2*  3.3* 3.5 3.1*   < >  --   --  3.5   CHLORIDE mmol/L 106 107 110* 104 103  104 103 102   < >  --   --  98   CO2 mmol/L 20* 16* 15* 18* 20*  19* 19* 20*   < >  --   --  20*   BUN mg/dL 36* 34* 29* 25 24  24 23 23   < >  --   --  18   CREATININE  "mg/dL 4.64* 4.78* 4.66* 4.30* 4.06*  4.06* 3.89* 3.64*   < >  --   --  3.07*   CALCIUM mg/dL 7.2* 7.2* 7.6* 8.2* 8.1*  8.0* 8.3* 8.1*   < >  --   --  9.2   MAGNESIUM mg/dL 1.5*  --  1.6*  --   --   --   --   --   --  1.8*  --    PHOSPHORUS mg/dL  --   --  3.0  --  2.6*  --   --   --   --  2.7  --    ALBUMIN g/dL  --  2.0* 2.1*  --  2.1*  --   --   --   --   --  2.3*    < > = values in this interval not displayed.       Administrative Statements     Portions of the record may have been created with voice recognition software. Occasional wrong word or \"sound a like\" substitutions may have occurred due to the inherent limitations of voice recognition software. Read the chart carefully and recognize, using context, where substitutions have occurred.If you have any questions, please contact the dictating provider.  "

## 2025-02-11 NOTE — PLAN OF CARE
Problem: PHYSICAL THERAPY ADULT  Goal: Performs mobility at highest level of function for planned discharge setting.  See evaluation for individualized goals.  Description: Treatment/Interventions: Functional transfer training, LE strengthening/ROM, Therapeutic exercise, Endurance training, Elevations, Bed mobility, Gait training          See flowsheet documentation for full assessment, interventions and recommendations.  Outcome: Progressing  Note: Prognosis: Good  Problem List: Decreased strength, Decreased endurance, Impaired balance, Decreased mobility, Decreased safety awareness  Assessment: Pt seen this date for PT treatment session to increase level of mobility and functional activity tolerance. PT treatment session consisting of  therapeutic exercises, bed mobility and transfers. Pt. Currently performing bed mobility, tx at  min- mod  x 1-  2 level of function with use of RW .  Stood at EOB ~ 3 min with fair- balance and min A x 1. Declined ambulation due to LE pain. In comparison to previous session, Pt. With no change  activity tolerance. Pt is in need of continued activity in PT to improve strength balance endurance mobility transfers and ambulation with return to maximize LOF. From PT/mobility standpoint, recommendation at time of d/c would be Level I:  maximum resource intensity  in order to promote return to PLOF and independence.  The patient's -[facility] Basic Mobility Inpatient Short Form Raw Score is 11. A Raw score of less than or equal to 16 suggests the patient may benefit from discharge to post-acute rehabilitation services. Please also refer to the recommendation of the Physical Therapist for safe discharge planning. Pt continues to be functioning below baseline level. PT will continue to see pt during current hospitalization in order to address the deficits listed above and provide interventions consistent w/ POC in effort to achieve goals.        Rehab Resource Intensity Level, PT: I (Maximum  Resource Intensity)    See flowsheet documentation for full assessment.

## 2025-02-11 NOTE — ASSESSMENT & PLAN NOTE
Patient still with significant ambulatory dysfunction and reported left lower extremity weakness and pain.    Check plain films left hip, check left femur plain films.    Patient with focal weakness, history of CVA, check MRI of the brain without contrast to rule out subacute CVA.

## 2025-02-11 NOTE — ASSESSMENT & PLAN NOTE
Patient with hyperbilirbuinemia and elevated LFT in the setting of rhabdomyolysis and ETOH use    Likely multifactorial including alcohol use, fatty liver disease, rhabdomyolysis.

## 2025-02-11 NOTE — ASSESSMENT & PLAN NOTE
Likely due to prerenal azotemia and rhabdomyolysis, IV fluid management per nephrology    Suspected component of ATN, appreciate nephrology input

## 2025-02-11 NOTE — PLAN OF CARE
Problem: RESPIRATORY - ADULT  Goal: Achieves optimal ventilation and oxygenation  Description: INTERVENTIONS:  - Assess for changes in respiratory status  - Assess for changes in mentation and behavior  - Position to facilitate oxygenation and minimize respiratory effort  - Oxygen administered by appropriate delivery if ordered  - Initiate smoking cessation education as indicated  - Encourage broncho-pulmonary hygiene including cough, deep breathe, Incentive Spirometry  - Assess the need for suctioning and aspirate as needed  - Assess and instruct to report SOB or any respiratory difficulty  - Respiratory Therapy support as indicated  Outcome: Progressing     Problem: SKIN/TISSUE INTEGRITY - ADULT  Goal: Skin Integrity remains intact(Skin Breakdown Prevention)  Description: Assess:  -Perform Tani assessment every 2  -Clean and moisturize skin every incont. Episode   -Inspect skin when repositioning, toileting, and assisting with ADLS  -Assess extremities for adequate circulation and sensation     Bed Management:  -Have minimal linens on bed & keep smooth, unwrinkled  -Change linens as needed when moist or perspiring  -Avoid sitting or lying in one position for more than 2 hours while in bed  -Keep HOB at 30 degrees     Toileting:  -Offer bedside commode  -Assess for incontinence every shift  -Use incontinent care products after each incontinent episode such as alejandra wipes    Activity:  -Mobilize patient 3 times a day  -Encourage activity and walks on unit  -Encourage or provide ROM exercises   -Turn and reposition patient every 2 Hours  -Use appropriate equipment to lift or move patient in bed  -Instruct/ Assist with weight shifting every 60 mins when out of bed in chair  -Consider limitation of chair time 6 hour intervals    Skin Care:  -Avoid use of baby powder, tape, friction and shearing, hot water or constrictive clothing  -Relieve pressure over bony prominences using foam wedges/pillows  -Do not massage red  bony areas    Outcome: Progressing     Problem: MUSCULOSKELETAL - ADULT  Goal: Maintain or return mobility to safest level of function  Description: INTERVENTIONS:  - Assess patient's ability to carry out ADLs; assess patient's baseline for ADL function and identify physical deficits which impact ability to perform ADLs (bathing, care of mouth/teeth, toileting, grooming, dressing, etc.)  - Assess/evaluate cause of self-care deficits   - Assess range of motion  - Assess patient's mobility  - Assess patient's need for assistive devices and provide as appropriate  - Encourage maximum independence but intervene and supervise when necessary  - Involve family in performance of ADLs  - Assess for home care needs following discharge   - Consider OT consult to assist with ADL evaluation and planning for discharge  - Provide patient education as appropriate  Outcome: Progressing     Problem: GASTROINTESTINAL - ADULT  Goal: Maintains or returns to baseline bowel function  Description: INTERVENTIONS:  - Assess bowel function  - Encourage oral fluids to ensure adequate hydration  - Administer IV fluids if ordered to ensure adequate hydration  - Administer ordered medications as needed  - Encourage mobilization and activity  - Consider nutritional services referral to assist patient with adequate nutrition and appropriate food choices  Outcome: Progressing     Problem: PAIN - ADULT  Goal: Verbalizes/displays adequate comfort level or baseline comfort level  Description: Interventions:  - Encourage patient to monitor pain and request assistance  - Assess pain using appropriate pain scale  - Administer analgesics based on type and severity of pain and evaluate response  - Implement non-pharmacological measures as appropriate and evaluate response  - Consider cultural and social influences on pain and pain management  - Notify physician/advanced practitioner if interventions unsuccessful or patient reports new pain  Outcome:  Progressing     Problem: INFECTION - ADULT  Goal: Absence or prevention of progression during hospitalization  Description: INTERVENTIONS:  - Assess and monitor for signs and symptoms of infection  - Monitor lab/diagnostic results  - Monitor all insertion sites, i.e. indwelling lines, tubes, and drains  - Monitor endotracheal if appropriate and nasal secretions for changes in amount and color  - [place] appropriate cooling/warming therapies per order  - Administer medications as ordered  - Instruct and encourage patient and family to use good hand hygiene technique  - Identify and instruct in appropriate isolation precautions for identified infection/condition  Outcome: Progressing     Problem: SAFETY ADULT  Goal: Patient will remain free of falls  Description: INTERVENTIONS:  - Educate patient/family on patient safety including physical limitations  - Instruct patient to call for assistance with activity   - Consult OT/PT to assist with strengthening/mobility   - Keep Call bell within reach  - Keep bed low and locked with side rails adjusted as appropriate  - Keep care items and personal belongings within reach  - Initiate and maintain comfort rounds  - Make Fall Risk Sign visible to staff  - Offer Toileting every 2 Hours, in advance of need  - Initiate/Maintain bed/chair alarm  - Obtain necessary fall risk management equipment: non skid socks  - Apply yellow socks and bracelet for high fall risk patients  - Consider moving patient to room near nurses station  Outcome: Progressing  Goal: Maintain or return to baseline ADL function  Description: INTERVENTIONS:  -  Assess patient's ability to carry out ADLs; assess patient's baseline for ADL function and identify physical deficits which impact ability to perform ADLs (bathing, care of mouth/teeth, toileting, grooming, dressing, etc.)  - Assess/evaluate cause of self-care deficits   - Assess range of motion  - Assess patient's mobility; develop plan if impaired  -  Assess patient's need for assistive devices and provide as appropriate  - Encourage maximum independence but intervene and supervise when necessary  - Involve family in performance of ADLs  - Assess for home care needs following discharge   - Consider OT consult to assist with ADL evaluation and planning for discharge  - Provide patient education as appropriate  Outcome: Progressing  Goal: Maintains/Returns to pre admission functional level  Description: INTERVENTIONS:  - Perform AM-PAC 6 Click Basic Mobility/ Daily Activity assessment daily.  - Set and communicate daily mobility goal to care team and patient/family/caregiver.   - Collaborate with rehabilitation services on mobility goals if consulted  - Perform Range of Motion 3 times a day.  - Reposition patient every 2 hours.  - Dangle patient 3 times a day  - Stand patient 3 times a day  - Ambulate patient 3 times a day  - Out of bed to chair 3 times a day   - Out of bed for meals 3 times a day  - Out of bed for toileting  - Record patient progress and toleration of activity level   Outcome: Progressing     Problem: DISCHARGE PLANNING  Goal: Discharge to home or other facility with appropriate resources  Description: INTERVENTIONS:  - Identify barriers to discharge w/patient and caregiver  - Arrange for needed discharge resources and transportation as appropriate  - Identify discharge learning needs (meds, wound care, etc.)  - Arrange for interpretive services to assist at discharge as needed  - Refer to Case Management Department for coordinating discharge planning if the patient needs post-hospital services based on physician/advanced practitioner order or complex needs related to functional status, cognitive ability, or social support system  Outcome: Progressing     Problem: Knowledge Deficit  Goal: Patient/family/caregiver demonstrates understanding of disease process, treatment plan, medications, and discharge instructions  Description: Complete learning  assessment and assess knowledge base.  Interventions:  - Provide teaching at level of understanding  - Provide teaching via preferred learning methods  Outcome: Progressing     Problem: Prexisting or High Potential for Compromised Skin Integrity  Goal: Skin integrity is maintained or improved  Description: INTERVENTIONS:  - Identify patients at risk for skin breakdown  - Assess and monitor skin integrity  - Assess and monitor nutrition and hydration status  - Monitor labs   - Assess for incontinence   - Turn and reposition patient  - Assist with mobility/ambulation  - Relieve pressure over bony prominences  - Avoid friction and shearing  - Provide appropriate hygiene as needed including keeping skin clean and dry  - Evaluate need for skin moisturizer/barrier cream  - Collaborate with interdisciplinary team   - Patient/family teaching  - Consider wound care consult   Outcome: Progressing

## 2025-02-11 NOTE — ASSESSMENT & PLAN NOTE
Continue supportive care, as noted previously, patient most likely acute tubular necrosis but has not been keeping up with his fluids with probable substantial fluid losses still ongoing.  Continue to optimize care and avoid potential for nephrotoxins.  1.9 L UOP 2/10 in addition to 900 mL stool output.  Continue  ml/Hr and maintain bicarbonate 150 mEq at 75 ml/hr, please refer below for details.

## 2025-02-11 NOTE — CASE MANAGEMENT
Case Management Discharge Planning Note    Patient name Emiliano Rodriguez  Location /404-01 MRN [id number]  : 1975 Date 2025       Current Admission Date: 2025  Current Admission Diagnosis:Gastroesophageal reflux disease without esophagitis   Patient Active Problem List    Diagnosis Date Noted Date Diagnosed    Hypokalemia 2025     Volume depletion 2025     Acute tubular necrosis (HCC) 2025     Acute renal failure (ARF) (HCC) 2025     Hyponatremia 2025     Rhabdomyolysis 2025     Abnormal LFTs 2025     Acute diverticulitis 2025     Diarrhea 2025     Steatosis of liver 2025     Hepatomegaly 2025     CVA (cerebral vascular accident) (HCC) 2018     Gastroesophageal reflux disease without esophagitis 2018     TIA (transient ischemic attack) 2018     Alcohol abuse 2018     Tobacco abuse 2018       LOS (days): 4  Geometric Mean LOS (GMLOS) (days): 4.7  Days to GMLOS:0.5     OBJECTIVE:  Risk of Unplanned Readmission Score: 18.39         Current admission status: Inpatient   Preferred Pharmacy:   [id number] [address] [address]  Phone: [phone] Fax: [phone]    Primary Care Provider: Emmett Jimenez DO    Primary Insurance: [employer]  Secondary Insurance:     DISCHARGE DETAILS:    Discharge planning discussed with:: patient  Freedom of Choice: Yes  Comments - Freedom of Choice: agreeable to [facility] care  CM contacted family/caregiver?: Yes  Were Treatment Team discharge recommendations reviewed with patient/caregiver?: Yes  Did patient/caregiver verbalize understanding of patient care needs?: Yes  Were patient/caregiver advised of the risks associated with not following Treatment Team discharge recommendations?: Yes         Requested Home Health Care         Is the patient interested in [facility] at  discharge?: Yes  Home Health Discipline requested:: Nursing, Occupational Therapy, Physical Therapy  Home Health Agency Name:: [facility] Follow-Up Provider:: PCP  Home Health Services Needed:: Strengthening/Theraputic Exercises to Improve Function, Evaluate Functional Status and Safety (SN assessment)  Homebound Criteria Met:: Requires the Assistance of Another Person for Safe Ambulation or to Leave the Home, Uses an Assist Device (i.e. cane, walker, etc)  Supporting Clincal Findings:: Limited Endurance         Other Referral/Resources/Interventions Provided:  Interventions: [facility]    Would you like to participate in our [facility] service program?  : No - Declined    Treatment Team Recommendation: Short Term Rehab (pt unsure of if he wants STR)  Discharge Destination Plan:: Home with Home Health Care   Accepted by [facility]. [place] referrals pending for STR but pt unsure if he wants to go to rehab.

## 2025-02-11 NOTE — ASSESSMENT & PLAN NOTE
CK trending down 11,511 on 2/11, decreased from >20,000 on 2/9.  Continue with IV fluids as noted above.  Phosphorus 3.0 mg/dL, Magnesium 1.5 mg/dL on 2/11.  2 g magnesium sulfate ordered 2/11.  Continue to replete phosphorus and magnesium levels aggressively.

## 2025-02-11 NOTE — PLAN OF CARE
Problem: RESPIRATORY - ADULT  Goal: Achieves optimal ventilation and oxygenation  Description: INTERVENTIONS:  - Assess for changes in respiratory status  - Assess for changes in mentation and behavior  - Position to facilitate oxygenation and minimize respiratory effort  - Oxygen administered by appropriate delivery if ordered  - Initiate smoking cessation education as indicated  - Encourage broncho-pulmonary hygiene including cough, deep breathe, Incentive Spirometry  - Assess the need for suctioning and aspirate as needed  - Assess and instruct to report SOB or any respiratory difficulty  - Respiratory Therapy support as indicated  Outcome: Progressing     Problem: SKIN/TISSUE INTEGRITY - ADULT  Goal: Skin Integrity remains intact(Skin Breakdown Prevention)  Description: Assess:  -Perform Tani assessment every 2  -Clean and moisturize skin every incont. Episode   -Inspect skin when repositioning, toileting, and assisting with ADLS  -Assess extremities for adequate circulation and sensation     Bed Management:  -Have minimal linens on bed & keep smooth, unwrinkled  -Change linens as needed when moist or perspiring  -Avoid sitting or lying in one position for more than 2 hours while in bed  -Keep HOB at 30 degrees     Toileting:  -Offer bedside commode  -Assess for incontinence every shift  -Use incontinent care products after each incontinent episode such as alejandra wipes    Activity:  -Mobilize patient 3 times a day  -Encourage activity and walks on unit  -Encourage or provide ROM exercises   -Turn and reposition patient every 2 Hours  -Use appropriate equipment to lift or move patient in bed  -Instruct/ Assist with weight shifting every 60 mins when out of bed in chair  -Consider limitation of chair time 6 hour intervals    Skin Care:  -Avoid use of baby powder, tape, friction and shearing, hot water or constrictive clothing  -Relieve pressure over bony prominences using foam wedges/pillows  -Do not massage red  bony areas    Outcome: Progressing     Problem: MUSCULOSKELETAL - ADULT  Goal: Maintain or return mobility to safest level of function  Description: INTERVENTIONS:  - Assess patient's ability to carry out ADLs; assess patient's baseline for ADL function and identify physical deficits which impact ability to perform ADLs (bathing, care of mouth/teeth, toileting, grooming, dressing, etc.)  - Assess/evaluate cause of self-care deficits   - Assess range of motion  - Assess patient's mobility  - Assess patient's need for assistive devices and provide as appropriate  - Encourage maximum independence but intervene and supervise when necessary  - Involve family in performance of ADLs  - Assess for home care needs following discharge   - Consider OT consult to assist with ADL evaluation and planning for discharge  - Provide patient education as appropriate  Outcome: Progressing     Problem: PAIN - ADULT  Goal: Verbalizes/displays adequate comfort level or baseline comfort level  Description: Interventions:  - Encourage patient to monitor pain and request assistance  - Assess pain using appropriate pain scale  - Administer analgesics based on type and severity of pain and evaluate response  - Implement non-pharmacological measures as appropriate and evaluate response  - Consider cultural and social influences on pain and pain management  - Notify physician/advanced practitioner if interventions unsuccessful or patient reports new pain  Outcome: Progressing     Problem: INFECTION - ADULT  Goal: Absence or prevention of progression during hospitalization  Description: INTERVENTIONS:  - Assess and monitor for signs and symptoms of infection  - Monitor lab/diagnostic results  - Monitor all insertion sites, i.e. indwelling lines, tubes, and drains  - Monitor endotracheal if appropriate and nasal secretions for changes in amount and color  - [place] appropriate cooling/warming therapies per order  - Administer medications as  ordered  - Instruct and encourage patient and family to use good hand hygiene technique  - Identify and instruct in appropriate isolation precautions for identified infection/condition  Outcome: Progressing     Problem: SAFETY ADULT  Goal: Patient will remain free of falls  Description: INTERVENTIONS:  - Educate patient/family on patient safety including physical limitations  - Instruct patient to call for assistance with activity   - Consult OT/PT to assist with strengthening/mobility   - Keep Call bell within reach  - Keep bed low and locked with side rails adjusted as appropriate  - Keep care items and personal belongings within reach  - Initiate and maintain comfort rounds  - Make Fall Risk Sign visible to staff  - Offer Toileting every 2 Hours, in advance of need  - Initiate/Maintain bed/chair alarm  - Obtain necessary fall risk management equipment: non skid socks  - Apply yellow socks and bracelet for high fall risk patients  - Consider moving patient to room near nurses station  Outcome: Progressing  Goal: Maintain or return to baseline ADL function  Description: INTERVENTIONS:  -  Assess patient's ability to carry out ADLs; assess patient's baseline for ADL function and identify physical deficits which impact ability to perform ADLs (bathing, care of mouth/teeth, toileting, grooming, dressing, etc.)  - Assess/evaluate cause of self-care deficits   - Assess range of motion  - Assess patient's mobility; develop plan if impaired  - Assess patient's need for assistive devices and provide as appropriate  - Encourage maximum independence but intervene and supervise when necessary  - Involve family in performance of ADLs  - Assess for home care needs following discharge   - Consider OT consult to assist with ADL evaluation and planning for discharge  - Provide patient education as appropriate  Outcome: Progressing  Goal: Maintains/Returns to pre admission functional level  Description: INTERVENTIONS:  - Perform  AM-PAC 6 Click Basic Mobility/ Daily Activity assessment daily.  - Set and communicate daily mobility goal to care team and patient/family/caregiver.   - Collaborate with rehabilitation services on mobility goals if consulted  - Perform Range of Motion 3 times a day.  - Reposition patient every 2 hours.  - Dangle patient 3 times a day  - Stand patient 3 times a day  - Ambulate patient 3 times a day  - Out of bed to chair 3 times a day   - Out of bed for meals 3 times a day  - Out of bed for toileting  - Record patient progress and toleration of activity level   Outcome: Progressing     Problem: DISCHARGE PLANNING  Goal: Discharge to home or other facility with appropriate resources  Description: INTERVENTIONS:  - Identify barriers to discharge w/patient and caregiver  - Arrange for needed discharge resources and transportation as appropriate  - Identify discharge learning needs (meds, wound care, etc.)  - Arrange for interpretive services to assist at discharge as needed  - Refer to Case Management Department for coordinating discharge planning if the patient needs post-hospital services based on physician/advanced practitioner order or complex needs related to functional status, cognitive ability, or social support system  Outcome: Progressing     Problem: Knowledge Deficit  Goal: Patient/family/caregiver demonstrates understanding of disease process, treatment plan, medications, and discharge instructions  Description: Complete learning assessment and assess knowledge base.  Interventions:  - Provide teaching at level of understanding  - Provide teaching via preferred learning methods  Outcome: Progressing     Problem: Prexisting or High Potential for Compromised Skin Integrity  Goal: Skin integrity is maintained or improved  Description: INTERVENTIONS:  - Identify patients at risk for skin breakdown  - Assess and monitor skin integrity  - Assess and monitor nutrition and hydration status  - Monitor labs   -  Assess for incontinence   - Turn and reposition patient  - Assist with mobility/ambulation  - Relieve pressure over bony prominences  - Avoid friction and shearing  - Provide appropriate hygiene as needed including keeping skin clean and dry  - Evaluate need for skin moisturizer/barrier cream  - Collaborate with interdisciplinary team   - Patient/family teaching  - Consider wound care consult   Outcome: Progressing     Problem: GASTROINTESTINAL - ADULT  Goal: Maintains or returns to baseline bowel function  Description: INTERVENTIONS:  - Assess bowel function  - Encourage oral fluids to ensure adequate hydration  - Administer IV fluids if ordered to ensure adequate hydration  - Administer ordered medications as needed  - Encourage mobilization and activity  - Consider nutritional services referral to assist patient with adequate nutrition and appropriate food choices  Outcome: Progressing

## 2025-02-12 ENCOUNTER — APPOINTMENT (INPATIENT)
Dept: MRI IMAGING | Facility: HOSPITAL | Age: 50
DRG: 351 | End: 2025-02-12
Payer: COMMERCIAL

## 2025-02-12 PROBLEM — E83.42 HYPOMAGNESEMIA: Status: ACTIVE | Noted: 2025-02-12

## 2025-02-12 PROBLEM — E83.39 HYPOPHOSPHATEMIA: Status: ACTIVE | Noted: 2025-02-12

## 2025-02-12 LAB
ALBUMIN SERPL BCG-MCNC: 2.2 G/DL (ref 3.5–5)
ALP SERPL-CCNC: 113 U/L (ref 34–104)
ALT SERPL W P-5'-P-CCNC: 286 U/L (ref 7–52)
ANION GAP SERPL CALCULATED.3IONS-SCNC: 5 MMOL/L (ref 4–13)
AST SERPL W P-5'-P-CCNC: 471 U/L (ref 13–39)
BASOPHILS # BLD AUTO: 0.05 THOUSANDS/ΜL (ref 0–0.1)
BASOPHILS NFR BLD AUTO: 1 % (ref 0–1)
BILIRUB SERPL-MCNC: 2.04 MG/DL (ref 0.2–1)
BUN SERPL-MCNC: 33 MG/DL (ref 5–25)
CALCIUM ALBUM COR SERPL-MCNC: 8.7 MG/DL (ref 8.3–10.1)
CALCIUM SERPL-MCNC: 7.3 MG/DL (ref 8.4–10.2)
CHLORIDE SERPL-SCNC: 106 MMOL/L (ref 96–108)
CK SERPL-CCNC: 6911 U/L (ref 39–308)
CO2 SERPL-SCNC: 23 MMOL/L (ref 21–32)
CREAT SERPL-MCNC: 4.44 MG/DL (ref 0.6–1.3)
EOSINOPHIL # BLD AUTO: 0.11 THOUSAND/ΜL (ref 0–0.61)
EOSINOPHIL NFR BLD AUTO: 1 % (ref 0–6)
ERYTHROCYTE [DISTWIDTH] IN BLOOD BY AUTOMATED COUNT: 15.1 % (ref 11.6–15.1)
G LAMBLIA AG STL QL IA: NEGATIVE
GFR SERPL CREATININE-BSD FRML MDRD: 14 ML/MIN/1.73SQ M
GLUCOSE SERPL-MCNC: 85 MG/DL (ref 65–140)
HCT VFR BLD AUTO: 30 % (ref 36.5–49.3)
HGB BLD-MCNC: 9.8 G/DL (ref 12–17)
IMM GRANULOCYTES # BLD AUTO: 0.07 THOUSAND/UL (ref 0–0.2)
IMM GRANULOCYTES NFR BLD AUTO: 1 % (ref 0–2)
INR PPP: 1.28 (ref 0.85–1.19)
LYMPHOCYTES # BLD AUTO: 0.93 THOUSANDS/ΜL (ref 0.6–4.47)
LYMPHOCYTES NFR BLD AUTO: 11 % (ref 14–44)
MAGNESIUM SERPL-MCNC: 1.8 MG/DL (ref 1.9–2.7)
MCH RBC QN AUTO: 33.4 PG (ref 26.8–34.3)
MCHC RBC AUTO-ENTMCNC: 32.7 G/DL (ref 31.4–37.4)
MCV RBC AUTO: 102 FL (ref 82–98)
MONOCYTES # BLD AUTO: 0.85 THOUSAND/ΜL (ref 0.17–1.22)
MONOCYTES NFR BLD AUTO: 10 % (ref 4–12)
NEUTROPHILS # BLD AUTO: 6.88 THOUSANDS/ΜL (ref 1.85–7.62)
NEUTS SEG NFR BLD AUTO: 76 % (ref 43–75)
NRBC BLD AUTO-RTO: 0 /100 WBCS
O+P STL CONC: NORMAL
PHOSPHATE SERPL-MCNC: 1.8 MG/DL (ref 2.7–4.5)
PLATELET # BLD AUTO: 134 THOUSANDS/UL (ref 149–390)
PMV BLD AUTO: 11 FL (ref 8.9–12.7)
POTASSIUM SERPL-SCNC: 3.4 MMOL/L (ref 3.5–5.3)
PROT SERPL-MCNC: 5 G/DL (ref 6.4–8.4)
PROTHROMBIN TIME: 16.5 SECONDS (ref 12.3–15)
RBC # BLD AUTO: 2.93 MILLION/UL (ref 3.88–5.62)
SODIUM SERPL-SCNC: 134 MMOL/L (ref 135–147)
WBC # BLD AUTO: 8.89 THOUSAND/UL (ref 4.31–10.16)

## 2025-02-12 PROCEDURE — 99233 SBSQ HOSP IP/OBS HIGH 50: CPT

## 2025-02-12 PROCEDURE — 80053 COMPREHEN METABOLIC PANEL: CPT | Performed by: INTERNAL MEDICINE

## 2025-02-12 PROCEDURE — 84100 ASSAY OF PHOSPHORUS: CPT | Performed by: INTERNAL MEDICINE

## 2025-02-12 PROCEDURE — 97530 THERAPEUTIC ACTIVITIES: CPT

## 2025-02-12 PROCEDURE — 99232 SBSQ HOSP IP/OBS MODERATE 35: CPT | Performed by: INTERNAL MEDICINE

## 2025-02-12 PROCEDURE — 97116 GAIT TRAINING THERAPY: CPT

## 2025-02-12 PROCEDURE — 85610 PROTHROMBIN TIME: CPT | Performed by: INTERNAL MEDICINE

## 2025-02-12 PROCEDURE — 85025 COMPLETE CBC W/AUTO DIFF WBC: CPT | Performed by: INTERNAL MEDICINE

## 2025-02-12 PROCEDURE — 82550 ASSAY OF CK (CPK): CPT | Performed by: INTERNAL MEDICINE

## 2025-02-12 PROCEDURE — 70551 MRI BRAIN STEM W/O DYE: CPT

## 2025-02-12 PROCEDURE — 83735 ASSAY OF MAGNESIUM: CPT | Performed by: INTERNAL MEDICINE

## 2025-02-12 RX ORDER — LOPERAMIDE HYDROCHLORIDE 2 MG/1
2 CAPSULE ORAL 2 TIMES DAILY
Status: DISCONTINUED | OUTPATIENT
Start: 2025-02-12 | End: 2025-02-14

## 2025-02-12 RX ORDER — MAGNESIUM SULFATE HEPTAHYDRATE 40 MG/ML
2 INJECTION, SOLUTION INTRAVENOUS ONCE
Status: COMPLETED | OUTPATIENT
Start: 2025-02-12 | End: 2025-02-12

## 2025-02-12 RX ADMIN — Medication 250 MG: at 09:04

## 2025-02-12 RX ADMIN — PANTOPRAZOLE SODIUM 40 MG: 40 TABLET, DELAYED RELEASE ORAL at 05:57

## 2025-02-12 RX ADMIN — POTASSIUM PHOSPHATE, MONOBASIC AND POTASSIUM PHOSPHATE, DIBASIC 21 MMOL: 224; 236 INJECTION, SOLUTION, CONCENTRATE INTRAVENOUS at 11:56

## 2025-02-12 RX ADMIN — HEPARIN SODIUM 5000 UNITS: 5000 INJECTION, SOLUTION INTRAVENOUS; SUBCUTANEOUS at 13:47

## 2025-02-12 RX ADMIN — HEPARIN SODIUM 5000 UNITS: 5000 INJECTION, SOLUTION INTRAVENOUS; SUBCUTANEOUS at 05:57

## 2025-02-12 RX ADMIN — POTASSIUM CHLORIDE 40 MEQ: 1500 TABLET, EXTENDED RELEASE ORAL at 17:14

## 2025-02-12 RX ADMIN — SODIUM BICARBONATE 75 ML/HR: 84 INJECTION, SOLUTION INTRAVENOUS at 17:15

## 2025-02-12 RX ADMIN — CLOPIDOGREL 75 MG: 75 TABLET ORAL at 09:04

## 2025-02-12 RX ADMIN — THIAMINE HYDROCHLORIDE 500 MG: 100 INJECTION, SOLUTION INTRAMUSCULAR; INTRAVENOUS at 05:58

## 2025-02-12 RX ADMIN — SODIUM BICARBONATE 75 ML/HR: 84 INJECTION, SOLUTION INTRAVENOUS at 00:20

## 2025-02-12 RX ADMIN — THIAMINE HYDROCHLORIDE 500 MG: 100 INJECTION, SOLUTION INTRAMUSCULAR; INTRAVENOUS at 17:14

## 2025-02-12 RX ADMIN — LOPERAMIDE HYDROCHLORIDE 2 MG: 2 CAPSULE ORAL at 21:44

## 2025-02-12 RX ADMIN — POTASSIUM CHLORIDE 40 MEQ: 1500 TABLET, EXTENDED RELEASE ORAL at 09:04

## 2025-02-12 RX ADMIN — MAGNESIUM SULFATE HEPTAHYDRATE 2 G: 40 INJECTION, SOLUTION INTRAVENOUS at 09:03

## 2025-02-12 RX ADMIN — Medication 250 MG: at 17:14

## 2025-02-12 RX ADMIN — LOPERAMIDE HYDROCHLORIDE 2 MG: 2 CAPSULE ORAL at 10:41

## 2025-02-12 RX ADMIN — FOLIC ACID 1 MG: 1 TABLET ORAL at 09:04

## 2025-02-12 RX ADMIN — METOPROLOL SUCCINATE 25 MG: 25 TABLET, EXTENDED RELEASE ORAL at 09:04

## 2025-02-12 RX ADMIN — SODIUM CHLORIDE 100 ML/HR: 0.9 INJECTION, SOLUTION INTRAVENOUS at 17:15

## 2025-02-12 RX ADMIN — NICOTINE 1 PATCH: 21 PATCH, EXTENDED RELEASE TRANSDERMAL at 09:04

## 2025-02-12 RX ADMIN — SODIUM CHLORIDE 100 ML/HR: 0.9 INJECTION, SOLUTION INTRAVENOUS at 01:17

## 2025-02-12 RX ADMIN — HEPARIN SODIUM 5000 UNITS: 5000 INJECTION, SOLUTION INTRAVENOUS; SUBCUTANEOUS at 21:44

## 2025-02-12 NOTE — ASSESSMENT & PLAN NOTE
Pt with bout of acute diverticulitis in 12/2024.   CT at time of current admission with read of diverticulitis, though pt without abd pain.   Was treated for 5 days with ceftriaxone and Flagyl, this has since been discontinued.  Outpatient colonoscopy in 6 to 8 weeks.

## 2025-02-12 NOTE — PLAN OF CARE
Problem: PHYSICAL THERAPY ADULT  Goal: Performs mobility at highest level of function for planned discharge setting.  See evaluation for individualized goals.  Description: Treatment/Interventions: Functional transfer training, LE strengthening/ROM, Therapeutic exercise, Endurance training, Elevations, Bed mobility, Gait training          See flowsheet documentation for full assessment, interventions and recommendations.  Outcome: Progressing  Note: Prognosis: Good  Problem List: Decreased strength, Decreased endurance, Impaired balance, Decreased mobility, Decreased safety awareness  Assessment: Pt seen this date for PT treatment session to increase level of mobility and functional activity tolerance. PT treatment session consisting of  bed mobility, transfers and  gait training. Pt. Currently performing bed mobility, tx and ambulation at  mod-min  x 1-2 level of function with use of RW . In comparison to previous session, Pt. With improvement activity tolerance. Pt is in need of continued activity in PT to improve strength balance endurance mobility transfers and ambulation with return to maximize LOF. From PT/mobility standpoint, recommendation at time of d/c would be Level I:  maximum resource intensity  in order to promote return to PLOF and independence.  The patient's AM-[facility] Basic Mobility Inpatient Short Form Raw Score is 13. A Raw score of less than or equal to 16 suggests the patient may benefit from discharge to post-acute rehabilitation services. Please also refer to the recommendation of the Physical Therapist for safe discharge planning. Pt continues to be functioning below baseline level. PT will continue to see pt during current hospitalization in order to address the deficits listed above and provide interventions consistent w/ POC in effort to achieve goals.        Rehab Resource Intensity Level, PT: I (Maximum Resource Intensity)    See flowsheet documentation for full assessment.

## 2025-02-12 NOTE — PROGRESS NOTES
Progress Note - Gastroenterology   Name: Emiliano Rodriguez 49 y.o. male I MRN: [id number]  Unit/Bed#: 404-01 I Date of Admission: 2/7/2025   Date of Service: 2/12/2025 I Hospital Day: 5     Assessment & Plan  Abnormal LFTs  Elevation in LFTs is likely multifactorial, suspect in relation to EtOH abuse, hepatic steatosis, as well as secondary to rhabdomyolysis.  US with steatosis and patent hepatic vessels.   Serologic investigation was negative for autoimmune and genetic causes of elevated transaminases, ceruloplasmin was low, which can be seen in multitude of disorders, though at some point patient should have a 24-hour urine copper checked, we ultimately agreed to defer for now given his significant GUNJAN.    As previously discussed, there may be a component of alcoholic hepatitis, though discriminant function < 32 therefore steroids not indicated.    Continue to trend LFTs at this time.   Continue to avoid hepatotoxins.   Continue with complete etoh cessation.     Continue current diet as ordered.   Continue to maintain a large bore IV.  Continue to monitor hemoglobin and transfuse as per protocol.   Continue with serial abdominal exams.   Continue to monitor stool output for any overt signs of GI bleeding.   Alcohol abuse  Strongly encourage complete cessation.   Rhabdomyolysis  Per primary team.   CK is improving.   Diarrhea  Stool studies negative for infection. Fecal elastase wnl.   Fecal calprotectin elevated at 380.   Did have diverticulitis in 12/2024.   Was originally on rocephin/flagyl, this has been d/c.    Continue Banatrol supplement. Continue probiotics.   Trial imodium for symptom relief.   D/c rectal tube as soon as possible.   Acute diverticulitis  Pt with bout of acute diverticulitis in 12/2024.   CT at time of current admission with read of diverticulitis, though pt without abd pain.   Was treated for 5 days with ceftriaxone and Flagyl, this has since been discontinued.  Outpatient colonoscopy in 6  to 8 weeks.  Gastroesophageal reflux disease without esophagitis  Continue PPI as ordered.   Continue PRN antiemetics as ordered.   Acute renal failure (ARF) (HCC)  Per primary team and Nephrology.   Likely due to prerenal azotemia and rhabdomyolysis.   Hyponatremia  As per primary team.    Subjective : 50 y/o M w/ pmhx sig for GERD, TIA on Plavix, alcohol use disorder, tobacco use, episode of diverticulitis in 12/2024. Presented to hospital with LE weakness.     24 Hour Events : tolerating PO. No sig abd pain. Discomfort at site of rectal tube insertion. Shares he is continuing to have diarrhea. Continues with leg weakness.    Objective :  Temp:  [98.1 °F (36.7 °C)-98.3 °F (36.8 °C)] 98.1 °F (36.7 °C)  HR:  [81-89] 84  BP: (106-140)/(85-97) 139/97  Resp:  [13-15] 13  SpO2:  [91 %-98 %] 98 %  O2 Device: None (Room air)    Physical Exam  Vitals and nursing note reviewed.   Constitutional:       General: He is not in acute distress.     Appearance: He is well-developed.   HENT:      Head: Normocephalic and atraumatic.   Eyes:      General: No scleral icterus.     Conjunctiva/sclera: Conjunctivae normal.   Pulmonary:      Effort: Pulmonary effort is normal. No respiratory distress.   Abdominal:      General: Bowel sounds are normal. There is no distension.      Palpations: Abdomen is soft.      Tenderness: There is no abdominal tenderness. There is no guarding or rebound.   Skin:     General: Skin is warm and dry.      Coloration: Skin is not jaundiced.   Neurological:      Mental Status: He is alert.   Psychiatric:         Mood and Affect: Mood normal.         Behavior: Behavior normal.         Lab Results: I have reviewed the following results:CBC/BMP:   .     02/12/25  0541   WBC 8.89   HGB 9.8*   HCT 30.0*   *   SODIUM 134*   K 3.4*      CO2 23   BUN 33*   CREATININE 4.44*   GLUC 85   MG 1.8*   PHOS 1.8*    , Creatinine Clearance: Estimated Creatinine Clearance: 21.2 mL/min (A) (by C-G formula based on  SCr of 4.44 mg/dL (H))., LFTs:   .     02/12/25  0541   *   *   ALB 2.2*   TBILI 2.04*   ALKPHOS 113*    , PTT/INR:  .     02/12/25  0541   INR 1.28*        Imaging Results Review: I reviewed radiology reports from this admission including: CT abdomen/pelvis.  Other Study Results Review: No additional pertinent studies reviewed.    **Please note:  Dictation voice to text software may have been used in the creation of this record.  Occasional wrong word or “sound alike” substitutions may have occurred due to the inherent limitations of voice recognition software.  Read the chart carefully and recognize, using context, where substitutions have occurred.**

## 2025-02-12 NOTE — PLAN OF CARE
Problem: RESPIRATORY - ADULT  Goal: Achieves optimal ventilation and oxygenation  Description: INTERVENTIONS:  - Assess for changes in respiratory status  - Assess for changes in mentation and behavior  - Position to facilitate oxygenation and minimize respiratory effort  - Oxygen administered by appropriate delivery if ordered  - Initiate smoking cessation education as indicated  - Encourage broncho-pulmonary hygiene including cough, deep breathe, Incentive Spirometry  - Assess the need for suctioning and aspirate as needed  - Assess and instruct to report SOB or any respiratory difficulty  - Respiratory Therapy support as indicated  Outcome: Progressing     Problem: SKIN/TISSUE INTEGRITY - ADULT  Goal: Skin Integrity remains intact(Skin Breakdown Prevention)  Description: Assess:  -Perform Tani assessment every 2  -Clean and moisturize skin every incont. Episode   -Inspect skin when repositioning, toileting, and assisting with ADLS  -Assess extremities for adequate circulation and sensation     Bed Management:  -Have minimal linens on bed & keep smooth, unwrinkled  -Change linens as needed when moist or perspiring  -Avoid sitting or lying in one position for more than 2 hours while in bed  -Keep HOB at 30 degrees     Toileting:  -Offer bedside commode  -Assess for incontinence every shift  -Use incontinent care products after each incontinent episode such as alejandra wipes    Activity:  -Mobilize patient 3 times a day  -Encourage activity and walks on unit  -Encourage or provide ROM exercises   -Turn and reposition patient every 2 Hours  -Use appropriate equipment to lift or move patient in bed  -Instruct/ Assist with weight shifting every 60 mins when out of bed in chair  -Consider limitation of chair time 6 hour intervals    Skin Care:  -Avoid use of baby powder, tape, friction and shearing, hot water or constrictive clothing  -Relieve pressure over bony prominences using foam wedges/pillows  -Do not massage red  bony areas    Outcome: Progressing     Problem: MUSCULOSKELETAL - ADULT  Goal: Maintain or return mobility to safest level of function  Description: INTERVENTIONS:  - Assess patient's ability to carry out ADLs; assess patient's baseline for ADL function and identify physical deficits which impact ability to perform ADLs (bathing, care of mouth/teeth, toileting, grooming, dressing, etc.)  - Assess/evaluate cause of self-care deficits   - Assess range of motion  - Assess patient's mobility  - Assess patient's need for assistive devices and provide as appropriate  - Encourage maximum independence but intervene and supervise when necessary  - Involve family in performance of ADLs  - Assess for home care needs following discharge   - Consider OT consult to assist with ADL evaluation and planning for discharge  - Provide patient education as appropriate  Outcome: Progressing     Problem: GASTROINTESTINAL - ADULT  Goal: Maintains or returns to baseline bowel function  Description: INTERVENTIONS:  - Assess bowel function  - Encourage oral fluids to ensure adequate hydration  - Administer IV fluids if ordered to ensure adequate hydration  - Administer ordered medications as needed  - Encourage mobilization and activity  - Consider nutritional services referral to assist patient with adequate nutrition and appropriate food choices  Outcome: Progressing     Problem: PAIN - ADULT  Goal: Verbalizes/displays adequate comfort level or baseline comfort level  Description: Interventions:  - Encourage patient to monitor pain and request assistance  - Assess pain using appropriate pain scale  - Administer analgesics based on type and severity of pain and evaluate response  - Implement non-pharmacological measures as appropriate and evaluate response  - Consider cultural and social influences on pain and pain management  - Notify physician/advanced practitioner if interventions unsuccessful or patient reports new pain  Outcome:  Progressing     Problem: INFECTION - ADULT  Goal: Absence or prevention of progression during hospitalization  Description: INTERVENTIONS:  - Assess and monitor for signs and symptoms of infection  - Monitor lab/diagnostic results  - Monitor all insertion sites, i.e. indwelling lines, tubes, and drains  - Monitor endotracheal if appropriate and nasal secretions for changes in amount and color  - Ripton appropriate cooling/warming therapies per order  - Administer medications as ordered  - Instruct and encourage patient and family to use good hand hygiene technique  - Identify and instruct in appropriate isolation precautions for identified infection/condition  Outcome: Progressing     Problem: SAFETY ADULT  Goal: Patient will remain free of falls  Description: INTERVENTIONS:  - Educate patient/family on patient safety including physical limitations  - Instruct patient to call for assistance with activity   - Consult OT/PT to assist with strengthening/mobility   - Keep Call bell within reach  - Keep bed low and locked with side rails adjusted as appropriate  - Keep care items and personal belongings within reach  - Initiate and maintain comfort rounds  - Make Fall Risk Sign visible to staff  - Offer Toileting every 2 Hours, in advance of need  - Initiate/Maintain bed/chair alarm  - Obtain necessary fall risk management equipment: non skid socks  - Apply yellow socks and bracelet for high fall risk patients  - Consider moving patient to room near nurses station  Outcome: Progressing  Goal: Maintain or return to baseline ADL function  Description: INTERVENTIONS:  -  Assess patient's ability to carry out ADLs; assess patient's baseline for ADL function and identify physical deficits which impact ability to perform ADLs (bathing, care of mouth/teeth, toileting, grooming, dressing, etc.)  - Assess/evaluate cause of self-care deficits   - Assess range of motion  - Assess patient's mobility; develop plan if impaired  -  Assess patient's need for assistive devices and provide as appropriate  - Encourage maximum independence but intervene and supervise when necessary  - Involve family in performance of ADLs  - Assess for home care needs following discharge   - Consider OT consult to assist with ADL evaluation and planning for discharge  - Provide patient education as appropriate  Outcome: Progressing  Goal: Maintains/Returns to pre admission functional level  Description: INTERVENTIONS:  - Perform AM-PAC 6 Click Basic Mobility/ Daily Activity assessment daily.  - Set and communicate daily mobility goal to care team and patient/family/caregiver.   - Collaborate with rehabilitation services on mobility goals if consulted  - Perform Range of Motion 3 times a day.  - Reposition patient every 2 hours.  - Dangle patient 3 times a day  - Stand patient 3 times a day  - Ambulate patient 3 times a day  - Out of bed to chair 3 times a day   - Out of bed for meals 3 times a day  - Out of bed for toileting  - Record patient progress and toleration of activity level   Outcome: Progressing     Problem: DISCHARGE PLANNING  Goal: Discharge to home or other facility with appropriate resources  Description: INTERVENTIONS:  - Identify barriers to discharge w/patient and caregiver  - Arrange for needed discharge resources and transportation as appropriate  - Identify discharge learning needs (meds, wound care, etc.)  - Arrange for interpretive services to assist at discharge as needed  - Refer to Case Management Department for coordinating discharge planning if the patient needs post-hospital services based on physician/advanced practitioner order or complex needs related to functional status, cognitive ability, or social support system  Outcome: Progressing     Problem: Knowledge Deficit  Goal: Patient/family/caregiver demonstrates understanding of disease process, treatment plan, medications, and discharge instructions  Description: Complete learning  assessment and assess knowledge base.  Interventions:  - Provide teaching at level of understanding  - Provide teaching via preferred learning methods  Outcome: Progressing     Problem: Prexisting or High Potential for Compromised Skin Integrity  Goal: Skin integrity is maintained or improved  Description: INTERVENTIONS:  - Identify patients at risk for skin breakdown  - Assess and monitor skin integrity  - Assess and monitor nutrition and hydration status  - Monitor labs   - Assess for incontinence   - Turn and reposition patient  - Assist with mobility/ambulation  - Relieve pressure over bony prominences  - Avoid friction and shearing  - Provide appropriate hygiene as needed including keeping skin clean and dry  - Evaluate need for skin moisturizer/barrier cream  - Collaborate with interdisciplinary team   - Patient/family teaching  - Consider wound care consult   Outcome: Progressing

## 2025-02-12 NOTE — PROGRESS NOTES
Progress Note - Nephrology   Name: Emiliano Rodriguez 49 y.o. male I MRN: [id number]  Unit/Bed#: 404-01 I Date of Admission: 2/7/2025   Date of Service: 2/12/2025 I Hospital Day: 5     Assessment & Plan  Acute renal failure (ARF) (HCC)  Continue with supportive care for GUNJAN due to ATN.  Continue to optimize fluid management.  No changes made to fluids today because he is currently on normal saline infusion at 100 cc/h and sodium bicarb at 75 cc/h.  Bicarb today is 23.  CK levels downtrending from 11,000-7000.  Bladder scan is 47 mL.  Urine output is 2 L and stool output is 1 L for net +431 L over the past 24 hours.  Patient however is at risk for malnutrition, will need to optimize nutritional status.  Acute tubular necrosis (HCC)  Continue supportive care at this time  Hyponatremia  Mild ADH stimulus on top of volume depletion suspected  Hypokalemia  Agree with potassium chloride 40 mEq twice daily  Volume depletion  Continue to provide volume resuscitation with 3 L of stool output  Diarrhea  Continue with diarrhea management per gastroenterology  Hypomagnesemia  Continue to replace magnesium level  Hypophosphatemia  Continue to replace phosphorus level  Rhabdomyolysis  CK downtrending from 11,000-7000  Gastroesophageal reflux disease without esophagitis  Continue with PPI, potential endoscopy going forward  Alcohol abuse  Continue [facility] protocol, further care and management according to hospitalist recommendations.  Abnormal LFTs  Potentially related to alcohol intake, will be following with gastroenterology.  Acute diverticulitis  Status post course of ceftriaxone and metronidazole  Weakness of left lower extremity          Subjective   Brief History of Admission - 49 y.o. year old male with a past medical history of hypertension, CVA, chronic diastolic congestive heart failure, alcohol use, GERD, tobacco use who was admitted to [facility] after presenting with weakness to bilateral lower extremities.  Nephrology  consulted for GUNJAN.  Currently not interested in having his lunch        Objective :  Temp:  [98.1 °F (36.7 °C)-98.3 °F (36.8 °C)] 98.1 °F (36.7 °C)  HR:  [81-89] 84  BP: (106-140)/(85-97) 139/97  Resp:  [13-15] 13  SpO2:  [91 %-98 %] 98 %  O2 Device: None (Room air)    Current Weight: Weight - Scale: 90.9 kg (200 lb 6.4 oz)  First Weight: Weight - Scale: 90.3 kg (199 lb 1.2 oz)  I/O         02/10 0701  02/11 0700 02/11 0701  02/12 0700 02/12 0701  02/13 0700    P.O. 1140 1500 240    I.V. (mL/kg) 3190 (35.1) 1281.7 (14.1)     Total Intake(mL/kg) 4330 (47.6) 2781.7 (30.6) 240 (2.6)    Urine (mL/kg/hr) 1900 (0.9) 2000 (0.9) 100 (0.2)    Stool 900 1100 800    Total Output 2800 3100 900    Net +1530 -318.3 -660           Unmeasured Stool Occurrence  1 x           Physical Exam  Vitals and nursing note reviewed.   Constitutional:       General: He is not in acute distress.     Appearance: He is well-developed.   HENT:      Head: Normocephalic and atraumatic.   Eyes:      Conjunctiva/sclera: Conjunctivae normal.   Cardiovascular:      Rate and Rhythm: Normal rate and regular rhythm.      Heart sounds: No murmur heard.  Pulmonary:      Effort: Pulmonary effort is normal. No respiratory distress.      Breath sounds: Normal breath sounds.   Abdominal:      Palpations: Abdomen is soft.      Tenderness: There is no abdominal tenderness.   Musculoskeletal:         General: No swelling.      Cervical back: Neck supple.   Skin:     General: Skin is warm and dry.      Capillary Refill: Capillary refill takes less than 2 seconds.   Neurological:      Mental Status: He is alert.   Psychiatric:         Mood and Affect: Mood normal.         Medications:    Current Facility-Administered Medications:     acetaminophen (TYLENOL) tablet 650 mg, 650 mg, Oral, Q6H PRN, Prudencio Nix MD, 650 mg at 02/10/25 1126    aluminum-magnesium hydroxide-simethicone (MAALOX) oral suspension 30 mL, 30 mL, Oral, Q4H PRN, Dominique Farris MD,  30 mL at 02/09/25 2102    clopidogrel (PLAVIX) tablet 75 mg, 75 mg, Oral, Daily, Ankita Katz MD, 75 mg at 02/12/25 0904    folic acid (FOLVITE) tablet 1 mg, 1 mg, Oral, Daily, Ankita Katz MD, 1 mg at 02/12/25 0904    heparin (porcine) subcutaneous injection 5,000 Units, 5,000 Units, Subcutaneous, Q8H DAVION, 5,000 Units at 02/12/25 0557 **AND** [COMPLETED] Platelet count, , , Once, Ankita Katz MD    loperamide (IMODIUM) capsule 2 mg, 2 mg, Oral, BID, Martha Gagnon PA-C, 2 mg at 02/12/25 1041    metoprolol succinate (TOPROL-XL) 24 hr tablet 25 mg, 25 mg, Oral, Daily, Ankita Katz MD, 25 mg at 02/12/25 0904    nicotine (NICODERM CQ) 21 mg/24 hr TD 24 hr patch 1 patch, 1 patch, Transdermal, Daily, Ankita Katz MD, 1 patch at 02/12/25 0904    pantoprazole (PROTONIX) EC tablet 40 mg, 40 mg, Oral, Early Morning, Ankita Katz MD, 40 mg at 02/12/25 0557    potassium chloride (Klor-Con M20) CR tablet 40 mEq, 40 mEq, Oral, BID, Luana Schumacher, CRNP, 40 mEq at 02/12/25 0904    potassium phosphates 21 mmol in sodium chloride 0.9 % 250 mL infusion, 21 mmol, Intravenous, Once, Isaías Eller, DO, Last Rate: 62.5 mL/hr at 02/12/25 1156, 21 mmol at 02/12/25 1156    saccharomyces boulardii (FLORASTOR) capsule 250 mg, 250 mg, Oral, BID, Ankita Katz MD, 250 mg at 02/12/25 0904    sodium bicarbonate 150 mEq in dextrose 5 % 1,000 mL infusion, 75 mL/hr, Intravenous, Continuous, Luciano Guadarrama, DO, Last Rate: 75 mL/hr at 02/12/25 0020, 75 mL/hr at 02/12/25 0020    sodium chloride 0.9 % infusion, 100 mL/hr, Intravenous, Continuous, LAURIE Vitale, Last Rate: 100 mL/hr at 02/12/25 0117, 100 mL/hr at 02/12/25 0117    thiamine (VITAMIN B1) 500 mg in sodium chloride 0.9 % 50 mL IVPB, 500 mg, Intravenous, Q8H DAVION, Isaías Eller DO, Last Rate: 100 mL/hr at 02/12/25 0558, 500 mg at 02/12/25 0558    traZODone (DESYREL) tablet 50 mg, 50 mg, Oral, HS PRN, Garcia Escobar MD, 50 mg at 02/11/25  "[id number]      Lab Results: I have reviewed the following results:  Results from last 7 days   Lab Units 02/12/25  0541 02/11/25  0420 02/10/25  0406 02/09/25  0546 02/08/25  0903 02/08/25  0437 02/07/25  2355 02/07/25  0813 02/07/25  0725 02/07/25  0547 02/07/25  0453   WBC Thousand/uL 8.89 8.37 9.56 8.39  --  6.95  --   --   --   --  9.64   HEMOGLOBIN g/dL 9.8* 9.6* 10.0* 10.6*  --  10.8*  --   --   --   --  13.0   HEMATOCRIT % 30.0* 29.2* 31.4* 33.2*  --  32.8*  --   --   --   --  38.1   PLATELETS Thousands/uL 134* 128* 119* 118*  --  108*  --   --  105*  --  141*   POTASSIUM mmol/L 3.4* 3.2* 3.4* 4.0 3.6 3.2*  3.3* 3.5   < >  --   --  3.5   CHLORIDE mmol/L 106 106 107 110* 104 103  104 103   < >  --   --  98   CO2 mmol/L 23 20* 16* 15* 18* 20*  19* 19*   < >  --   --  20*   BUN mg/dL 33* 36* 34* 29* 25 24  24 23   < >  --   --  18   CREATININE mg/dL 4.44* 4.64* 4.78* 4.66* 4.30* 4.06*  4.06* 3.89*   < >  --   --  3.07*   CALCIUM mg/dL 7.3* 7.2* 7.2* 7.6* 8.2* 8.1*  8.0* 8.3*   < >  --   --  9.2   MAGNESIUM mg/dL 1.8* 1.5*  --  1.6*  --   --   --   --   --  1.8*  --    PHOSPHORUS mg/dL 1.8*  --   --  3.0  --  2.6*  --   --   --  2.7  --    ALBUMIN g/dL 2.2* 2.0* 2.0* 2.1*  --  2.1*  --   --   --   --  2.3*    < > = values in this interval not displayed.       Administrative Statements     Portions of the record may have been created with voice recognition software. Occasional wrong word or \"sound a like\" substitutions may have occurred due to the inherent limitations of voice recognition software. Read the chart carefully and recognize, using context, where substitutions have occurred.If you have any questions, please contact the dictating provider.  "

## 2025-02-12 NOTE — PLAN OF CARE
Problem: RESPIRATORY - ADULT  Goal: Achieves optimal ventilation and oxygenation  Description: INTERVENTIONS:  - Assess for changes in respiratory status  - Assess for changes in mentation and behavior  - Position to facilitate oxygenation and minimize respiratory effort  - Oxygen administered by appropriate delivery if ordered  - Initiate smoking cessation education as indicated  - Encourage broncho-pulmonary hygiene including cough, deep breathe, Incentive Spirometry  - Assess the need for suctioning and aspirate as needed  - Assess and instruct to report SOB or any respiratory difficulty  - Respiratory Therapy support as indicated  Outcome: Progressing     Problem: SKIN/TISSUE INTEGRITY - ADULT  Goal: Skin Integrity remains intact(Skin Breakdown Prevention)  Description: Assess:  -Perform Tani assessment every 2  -Clean and moisturize skin every incont. Episode   -Inspect skin when repositioning, toileting, and assisting with ADLS  -Assess extremities for adequate circulation and sensation     Bed Management:  -Have minimal linens on bed & keep smooth, unwrinkled  -Change linens as needed when moist or perspiring  -Avoid sitting or lying in one position for more than 2 hours while in bed  -Keep HOB at 30 degrees     Toileting:  -Offer bedside commode  -Assess for incontinence every shift  -Use incontinent care products after each incontinent episode such as alejandra wipes    Activity:  -Mobilize patient 3 times a day  -Encourage activity and walks on unit  -Encourage or provide ROM exercises   -Turn and reposition patient every 2 Hours  -Use appropriate equipment to lift or move patient in bed  -Instruct/ Assist with weight shifting every 60 mins when out of bed in chair  -Consider limitation of chair time 6 hour intervals    Skin Care:  -Avoid use of baby powder, tape, friction and shearing, hot water or constrictive clothing  -Relieve pressure over bony prominences using foam wedges/pillows  -Do not massage red  bony areas    Outcome: Progressing     Problem: MUSCULOSKELETAL - ADULT  Goal: Maintain or return mobility to safest level of function  Description: INTERVENTIONS:  - Assess patient's ability to carry out ADLs; assess patient's baseline for ADL function and identify physical deficits which impact ability to perform ADLs (bathing, care of mouth/teeth, toileting, grooming, dressing, etc.)  - Assess/evaluate cause of self-care deficits   - Assess range of motion  - Assess patient's mobility  - Assess patient's need for assistive devices and provide as appropriate  - Encourage maximum independence but intervene and supervise when necessary  - Involve family in performance of ADLs  - Assess for home care needs following discharge   - Consider OT consult to assist with ADL evaluation and planning for discharge  - Provide patient education as appropriate  Outcome: Progressing     Problem: PAIN - ADULT  Goal: Verbalizes/displays adequate comfort level or baseline comfort level  Description: Interventions:  - Encourage patient to monitor pain and request assistance  - Assess pain using appropriate pain scale  - Administer analgesics based on type and severity of pain and evaluate response  - Implement non-pharmacological measures as appropriate and evaluate response  - Consider cultural and social influences on pain and pain management  - Notify physician/advanced practitioner if interventions unsuccessful or patient reports new pain  Outcome: Progressing     Problem: INFECTION - ADULT  Goal: Absence or prevention of progression during hospitalization  Description: INTERVENTIONS:  - Assess and monitor for signs and symptoms of infection  - Monitor lab/diagnostic results  - Monitor all insertion sites, i.e. indwelling lines, tubes, and drains  - Monitor endotracheal if appropriate and nasal secretions for changes in amount and color  - [place] appropriate cooling/warming therapies per order  - Administer medications as  ordered  - Instruct and encourage patient and family to use good hand hygiene technique  - Identify and instruct in appropriate isolation precautions for identified infection/condition  Outcome: Progressing     Problem: SAFETY ADULT  Goal: Patient will remain free of falls  Description: INTERVENTIONS:  - Educate patient/family on patient safety including physical limitations  - Instruct patient to call for assistance with activity   - Consult OT/PT to assist with strengthening/mobility   - Keep Call bell within reach  - Keep bed low and locked with side rails adjusted as appropriate  - Keep care items and personal belongings within reach  - Initiate and maintain comfort rounds  - Make Fall Risk Sign visible to staff  - Offer Toileting every 2 Hours, in advance of need  - Initiate/Maintain bed/chair alarm  - Obtain necessary fall risk management equipment: non skid socks  - Apply yellow socks and bracelet for high fall risk patients  - Consider moving patient to room near nurses station  Outcome: Progressing  Goal: Maintain or return to baseline ADL function  Description: INTERVENTIONS:  -  Assess patient's ability to carry out ADLs; assess patient's baseline for ADL function and identify physical deficits which impact ability to perform ADLs (bathing, care of mouth/teeth, toileting, grooming, dressing, etc.)  - Assess/evaluate cause of self-care deficits   - Assess range of motion  - Assess patient's mobility; develop plan if impaired  - Assess patient's need for assistive devices and provide as appropriate  - Encourage maximum independence but intervene and supervise when necessary  - Involve family in performance of ADLs  - Assess for home care needs following discharge   - Consider OT consult to assist with ADL evaluation and planning for discharge  - Provide patient education as appropriate  Outcome: Progressing  Goal: Maintains/Returns to pre admission functional level  Description: INTERVENTIONS:  - Perform  AM-PAC 6 Click Basic Mobility/ Daily Activity assessment daily.  - Set and communicate daily mobility goal to care team and patient/family/caregiver.   - Collaborate with rehabilitation services on mobility goals if consulted  - Perform Range of Motion 3 times a day.  - Reposition patient every 2 hours.  - Dangle patient 3 times a day  - Stand patient 3 times a day  - Ambulate patient 3 times a day  - Out of bed to chair 3 times a day   - Out of bed for meals 3 times a day  - Out of bed for toileting  - Record patient progress and toleration of activity level   Outcome: Progressing     Problem: DISCHARGE PLANNING  Goal: Discharge to home or other facility with appropriate resources  Description: INTERVENTIONS:  - Identify barriers to discharge w/patient and caregiver  - Arrange for needed discharge resources and transportation as appropriate  - Identify discharge learning needs (meds, wound care, etc.)  - Arrange for interpretive services to assist at discharge as needed  - Refer to Case Management Department for coordinating discharge planning if the patient needs post-hospital services based on physician/advanced practitioner order or complex needs related to functional status, cognitive ability, or social support system  Outcome: Progressing     Problem: Knowledge Deficit  Goal: Patient/family/caregiver demonstrates understanding of disease process, treatment plan, medications, and discharge instructions  Description: Complete learning assessment and assess knowledge base.  Interventions:  - Provide teaching at level of understanding  - Provide teaching via preferred learning methods  Outcome: Progressing     Problem: Prexisting or High Potential for Compromised Skin Integrity  Goal: Skin integrity is maintained or improved  Description: INTERVENTIONS:  - Identify patients at risk for skin breakdown  - Assess and monitor skin integrity  - Assess and monitor nutrition and hydration status  - Monitor labs   -  Assess for incontinence   - Turn and reposition patient  - Assist with mobility/ambulation  - Relieve pressure over bony prominences  - Avoid friction and shearing  - Provide appropriate hygiene as needed including keeping skin clean and dry  - Evaluate need for skin moisturizer/barrier cream  - Collaborate with interdisciplinary team   - Patient/family teaching  - Consider wound care consult   Outcome: Progressing     Problem: GASTROINTESTINAL - ADULT  Goal: Maintains or returns to baseline bowel function  Description: INTERVENTIONS:  - Assess bowel function  - Encourage oral fluids to ensure adequate hydration  - Administer IV fluids if ordered to ensure adequate hydration  - Administer ordered medications as needed  - Encourage mobilization and activity  - Consider nutritional services referral to assist patient with adequate nutrition and appropriate food choices  Outcome: Progressing

## 2025-02-12 NOTE — ASSESSMENT & PLAN NOTE
Elevation in LFTs is likely multifactorial, suspect in relation to EtOH abuse, hepatic steatosis, as well as secondary to rhabdomyolysis.  US with steatosis and patent hepatic vessels.   Serologic investigation was negative for autoimmune and genetic causes of elevated transaminases, ceruloplasmin was low, which can be seen in multitude of disorders, though at some point patient should have a 24-hour urine copper checked, we ultimately agreed to defer for now given his significant GUNJAN.    As previously discussed, there may be a component of alcoholic hepatitis, though discriminant function < 32 therefore steroids not indicated.    Continue to trend LFTs at this time.   Continue to avoid hepatotoxins.   Continue with complete etoh cessation.     Continue current diet as ordered.   Continue to maintain a large bore IV.  Continue to monitor hemoglobin and transfuse as per protocol.   Continue with serial abdominal exams.   Continue to monitor stool output for any overt signs of GI bleeding.

## 2025-02-12 NOTE — ASSESSMENT & PLAN NOTE
Continue with supportive care for GUNJAN due to ATN.  Continue to optimize fluid management.  No changes made to fluids today because he is currently on normal saline infusion at 100 cc/h and sodium bicarb at 75 cc/h.  Bicarb today is 23.  CK levels downtrending from 11,000-7000.  Bladder scan is 47 mL.  Urine output is 2 L and stool output is 1 L for net +431 L over the past 24 hours.  Patient however is at risk for malnutrition, will need to optimize nutritional status.

## 2025-02-12 NOTE — ASSESSMENT & PLAN NOTE
Stool studies negative for infection. Fecal elastase wnl.   Fecal calprotectin elevated at 380.   Did have diverticulitis in 12/2024.   Was originally on rocephin/flagyl, this has been d/c.    Continue Banatrol supplement. Continue probiotics.   Trial imodium for symptom relief.   D/c rectal tube as soon as possible.

## 2025-02-13 LAB
ANION GAP SERPL CALCULATED.3IONS-SCNC: 8 MMOL/L (ref 4–13)
BUN SERPL-MCNC: 29 MG/DL (ref 5–25)
CALCIUM SERPL-MCNC: 7.4 MG/DL (ref 8.4–10.2)
CHLORIDE SERPL-SCNC: 106 MMOL/L (ref 96–108)
CK SERPL-CCNC: 4385 U/L (ref 39–308)
CO2 SERPL-SCNC: 23 MMOL/L (ref 21–32)
CREAT SERPL-MCNC: 4.2 MG/DL (ref 0.6–1.3)
GFR SERPL CREATININE-BSD FRML MDRD: 15 ML/MIN/1.73SQ M
GLUCOSE SERPL-MCNC: 113 MG/DL (ref 65–140)
MAGNESIUM SERPL-MCNC: 1.7 MG/DL (ref 1.9–2.7)
PHOSPHATE SERPL-MCNC: 2.5 MG/DL (ref 2.7–4.5)
POTASSIUM SERPL-SCNC: 3.6 MMOL/L (ref 3.5–5.3)
SODIUM SERPL-SCNC: 137 MMOL/L (ref 135–147)

## 2025-02-13 PROCEDURE — 86235 NUCLEAR ANTIGEN ANTIBODY: CPT

## 2025-02-13 PROCEDURE — 83735 ASSAY OF MAGNESIUM: CPT | Performed by: INTERNAL MEDICINE

## 2025-02-13 PROCEDURE — 99232 SBSQ HOSP IP/OBS MODERATE 35: CPT

## 2025-02-13 PROCEDURE — 97530 THERAPEUTIC ACTIVITIES: CPT

## 2025-02-13 PROCEDURE — 97535 SELF CARE MNGMENT TRAINING: CPT

## 2025-02-13 PROCEDURE — 84182 PROTEIN WESTERN BLOT TEST: CPT

## 2025-02-13 PROCEDURE — 84100 ASSAY OF PHOSPHORUS: CPT | Performed by: INTERNAL MEDICINE

## 2025-02-13 PROCEDURE — 82550 ASSAY OF CK (CPK): CPT | Performed by: INTERNAL MEDICINE

## 2025-02-13 PROCEDURE — 80048 BASIC METABOLIC PNL TOTAL CA: CPT | Performed by: INTERNAL MEDICINE

## 2025-02-13 PROCEDURE — 97116 GAIT TRAINING THERAPY: CPT

## 2025-02-13 PROCEDURE — 99232 SBSQ HOSP IP/OBS MODERATE 35: CPT | Performed by: INTERNAL MEDICINE

## 2025-02-13 RX ORDER — POTASSIUM CHLORIDE 1500 MG/1
20 TABLET, EXTENDED RELEASE ORAL
Status: COMPLETED | OUTPATIENT
Start: 2025-02-13 | End: 2025-02-13

## 2025-02-13 RX ORDER — LOPERAMIDE HYDROCHLORIDE 2 MG/1
4 CAPSULE ORAL ONCE
Status: COMPLETED | OUTPATIENT
Start: 2025-02-13 | End: 2025-02-13

## 2025-02-13 RX ORDER — CALCIUM GLUCONATE 20 MG/ML
1 INJECTION, SOLUTION INTRAVENOUS ONCE
Status: COMPLETED | OUTPATIENT
Start: 2025-02-13 | End: 2025-02-13

## 2025-02-13 RX ORDER — MICONAZOLE NITRATE 20 MG/G
CREAM TOPICAL 2 TIMES DAILY
Status: DISCONTINUED | OUTPATIENT
Start: 2025-02-13 | End: 2025-02-19 | Stop reason: HOSPADM

## 2025-02-13 RX ORDER — MAGNESIUM SULFATE HEPTAHYDRATE 40 MG/ML
2 INJECTION, SOLUTION INTRAVENOUS ONCE
Status: COMPLETED | OUTPATIENT
Start: 2025-02-13 | End: 2025-02-13

## 2025-02-13 RX ADMIN — SODIUM PHOSPHATE, MONOBASIC, MONOHYDRATE AND SODIUM PHOSPHATE, DIBASIC, ANHYDROUS 21 MMOL: 142; 276 INJECTION, SOLUTION INTRAVENOUS at 12:46

## 2025-02-13 RX ADMIN — HEPARIN SODIUM 5000 UNITS: 5000 INJECTION, SOLUTION INTRAVENOUS; SUBCUTANEOUS at 05:04

## 2025-02-13 RX ADMIN — Medication 250 MG: at 08:53

## 2025-02-13 RX ADMIN — PANTOPRAZOLE SODIUM 40 MG: 40 TABLET, DELAYED RELEASE ORAL at 05:04

## 2025-02-13 RX ADMIN — HEPARIN SODIUM 5000 UNITS: 5000 INJECTION, SOLUTION INTRAVENOUS; SUBCUTANEOUS at 21:49

## 2025-02-13 RX ADMIN — CALCIUM GLUCONATE 1 G: 20 INJECTION, SOLUTION INTRAVENOUS at 16:48

## 2025-02-13 RX ADMIN — SODIUM BICARBONATE 75 ML/HR: 84 INJECTION, SOLUTION INTRAVENOUS at 08:53

## 2025-02-13 RX ADMIN — NICOTINE 1 PATCH: 21 PATCH, EXTENDED RELEASE TRANSDERMAL at 08:57

## 2025-02-13 RX ADMIN — ALUMINUM HYDROXIDE, MAGNESIUM HYDROXIDE, AND DIMETHICONE 30 ML: 200; 20; 200 SUSPENSION ORAL at 20:24

## 2025-02-13 RX ADMIN — METOPROLOL SUCCINATE 25 MG: 25 TABLET, EXTENDED RELEASE ORAL at 08:53

## 2025-02-13 RX ADMIN — LOPERAMIDE HYDROCHLORIDE 2 MG: 2 CAPSULE ORAL at 20:24

## 2025-02-13 RX ADMIN — HEPARIN SODIUM 5000 UNITS: 5000 INJECTION, SOLUTION INTRAVENOUS; SUBCUTANEOUS at 14:08

## 2025-02-13 RX ADMIN — LOPERAMIDE HYDROCHLORIDE 2 MG: 2 CAPSULE ORAL at 08:53

## 2025-02-13 RX ADMIN — LOPERAMIDE HYDROCHLORIDE 4 MG: 2 CAPSULE ORAL at 22:05

## 2025-02-13 RX ADMIN — TRAZODONE HYDROCHLORIDE 50 MG: 50 TABLET ORAL at 00:08

## 2025-02-13 RX ADMIN — POTASSIUM CHLORIDE 20 MEQ: 1500 TABLET, EXTENDED RELEASE ORAL at 16:48

## 2025-02-13 RX ADMIN — CLOPIDOGREL 75 MG: 75 TABLET ORAL at 08:53

## 2025-02-13 RX ADMIN — SODIUM CHLORIDE 100 ML/HR: 0.9 INJECTION, SOLUTION INTRAVENOUS at 22:06

## 2025-02-13 RX ADMIN — MAGNESIUM SULFATE HEPTAHYDRATE 2 G: 40 INJECTION, SOLUTION INTRAVENOUS at 10:37

## 2025-02-13 RX ADMIN — FOLIC ACID 1 MG: 1 TABLET ORAL at 08:53

## 2025-02-13 RX ADMIN — MICONAZOLE NITRATE: 20 CREAM TOPICAL at 17:35

## 2025-02-13 RX ADMIN — POTASSIUM CHLORIDE 20 MEQ: 1500 TABLET, EXTENDED RELEASE ORAL at 10:37

## 2025-02-13 RX ADMIN — POTASSIUM CHLORIDE 20 MEQ: 1500 TABLET, EXTENDED RELEASE ORAL at 12:46

## 2025-02-13 RX ADMIN — THIAMINE HYDROCHLORIDE 500 MG: 100 INJECTION, SOLUTION INTRAMUSCULAR; INTRAVENOUS at 00:09

## 2025-02-13 RX ADMIN — SODIUM CHLORIDE 100 ML/HR: 0.9 INJECTION, SOLUTION INTRAVENOUS at 04:35

## 2025-02-13 RX ADMIN — THIAMINE HYDROCHLORIDE 500 MG: 100 INJECTION, SOLUTION INTRAMUSCULAR; INTRAVENOUS at 08:53

## 2025-02-13 RX ADMIN — THIAMINE HYDROCHLORIDE 500 MG: 100 INJECTION, SOLUTION INTRAMUSCULAR; INTRAVENOUS at 17:35

## 2025-02-13 RX ADMIN — Medication 250 MG: at 17:35

## 2025-02-13 RX ADMIN — THIAMINE HYDROCHLORIDE 500 MG: 100 INJECTION, SOLUTION INTRAMUSCULAR; INTRAVENOUS at 21:49

## 2025-02-13 NOTE — PLAN OF CARE
Problem: RESPIRATORY - ADULT  Goal: Achieves optimal ventilation and oxygenation  Description: INTERVENTIONS:  - Assess for changes in respiratory status  - Assess for changes in mentation and behavior  - Position to facilitate oxygenation and minimize respiratory effort  - Oxygen administered by appropriate delivery if ordered  - Initiate smoking cessation education as indicated  - Encourage broncho-pulmonary hygiene including cough, deep breathe, Incentive Spirometry  - Assess the need for suctioning and aspirate as needed  - Assess and instruct to report SOB or any respiratory difficulty  - Respiratory Therapy support as indicated  2/13/2025 0130 by Courtney Pineda RN  Outcome: Progressing  2/13/2025 0130 by Courtney Pineda RN  Outcome: Progressing     Problem: SKIN/TISSUE INTEGRITY - ADULT  Goal: Skin Integrity remains intact(Skin Breakdown Prevention)  Description: Assess:  -Perform Tani assessment every 2  -Clean and moisturize skin every incont. Episode   -Inspect skin when repositioning, toileting, and assisting with ADLS  -Assess extremities for adequate circulation and sensation     Bed Management:  -Have minimal linens on bed & keep smooth, unwrinkled  -Change linens as needed when moist or perspiring  -Avoid sitting or lying in one position for more than 2 hours while in bed  -Keep HOB at 30 degrees     Toileting:  -Offer bedside commode  -Assess for incontinence every shift  -Use incontinent care products after each incontinent episode such as alejandra wipes    Activity:  -Mobilize patient 3 times a day  -Encourage activity and walks on unit  -Encourage or provide ROM exercises   -Turn and reposition patient every 2 Hours  -Use appropriate equipment to lift or move patient in bed  -Instruct/ Assist with weight shifting every 60 mins when out of bed in chair  -Consider limitation of chair time 6 hour intervals    Skin Care:  -Avoid use of baby powder, tape, friction and shearing, hot water or constrictive  clothing  -Relieve pressure over bony prominences using foam wedges/pillows  -Do not massage red bony areas    2/13/2025 0130 by Courtney Pineda RN  Outcome: Progressing  2/13/2025 0130 by Courtney Pineda RN  Outcome: Progressing     Problem: MUSCULOSKELETAL - ADULT  Goal: Maintain or return mobility to safest level of function  Description: INTERVENTIONS:  - Assess patient's ability to carry out ADLs; assess patient's baseline for ADL function and identify physical deficits which impact ability to perform ADLs (bathing, care of mouth/teeth, toileting, grooming, dressing, etc.)  - Assess/evaluate cause of self-care deficits   - Assess range of motion  - Assess patient's mobility  - Assess patient's need for assistive devices and provide as appropriate  - Encourage maximum independence but intervene and supervise when necessary  - Involve family in performance of ADLs  - Assess for home care needs following discharge   - Consider OT consult to assist with ADL evaluation and planning for discharge  - Provide patient education as appropriate  2/13/2025 0130 by Courtney Pineda RN  Outcome: Progressing  2/13/2025 0130 by Courtney Pineda RN  Outcome: Progressing     Problem: GASTROINTESTINAL - ADULT  Goal: Maintains or returns to baseline bowel function  Description: INTERVENTIONS:  - Assess bowel function  - Encourage oral fluids to ensure adequate hydration  - Administer IV fluids if ordered to ensure adequate hydration  - Administer ordered medications as needed  - Encourage mobilization and activity  - Consider nutritional services referral to assist patient with adequate nutrition and appropriate food choices  2/13/2025 0130 by Courtney Pineda RN  Outcome: Progressing  2/13/2025 0130 by Courtney Pineda RN  Outcome: Progressing     Problem: PAIN - ADULT  Goal: Verbalizes/displays adequate comfort level or baseline comfort level  Description: Interventions:  - Encourage patient to monitor pain and request assistance  -  Assess pain using appropriate pain scale  - Administer analgesics based on type and severity of pain and evaluate response  - Implement non-pharmacological measures as appropriate and evaluate response  - Consider cultural and social influences on pain and pain management  - Notify physician/advanced practitioner if interventions unsuccessful or patient reports new pain  2/13/2025 0130 by Courtney Pineda RN  Outcome: Progressing  2/13/2025 0130 by Courtney Pineda RN  Outcome: Progressing     Problem: INFECTION - ADULT  Goal: Absence or prevention of progression during hospitalization  Description: INTERVENTIONS:  - Assess and monitor for signs and symptoms of infection  - Monitor lab/diagnostic results  - Monitor all insertion sites, i.e. indwelling lines, tubes, and drains  - Monitor endotracheal if appropriate and nasal secretions for changes in amount and color  - [place] appropriate cooling/warming therapies per order  - Administer medications as ordered  - Instruct and encourage patient and family to use good hand hygiene technique  - Identify and instruct in appropriate isolation precautions for identified infection/condition  2/13/2025 0130 by Courtney Pineda RN  Outcome: Progressing  2/13/2025 0130 by Courtney Pineda RN  Outcome: Progressing     Problem: SAFETY ADULT  Goal: Patient will remain free of falls  Description: INTERVENTIONS:  - Educate patient/family on patient safety including physical limitations  - Instruct patient to call for assistance with activity   - Consult OT/PT to assist with strengthening/mobility   - Keep Call bell within reach  - Keep bed low and locked with side rails adjusted as appropriate  - Keep care items and personal belongings within reach  - Initiate and maintain comfort rounds  - Make Fall Risk Sign visible to staff  - Offer Toileting every 2 Hours, in advance of need  - Initiate/Maintain bed/chair alarm  - Obtain necessary fall risk management equipment: non skid socks  -  Apply yellow socks and bracelet for high fall risk patients  - Consider moving patient to room near nurses station  2/13/2025 0130 by Courtney Pineda RN  Outcome: Progressing  2/13/2025 0130 by Courtney Pineda RN  Outcome: Progressing  Goal: Maintain or return to baseline ADL function  Description: INTERVENTIONS:  -  Assess patient's ability to carry out ADLs; assess patient's baseline for ADL function and identify physical deficits which impact ability to perform ADLs (bathing, care of mouth/teeth, toileting, grooming, dressing, etc.)  - Assess/evaluate cause of self-care deficits   - Assess range of motion  - Assess patient's mobility; develop plan if impaired  - Assess patient's need for assistive devices and provide as appropriate  - Encourage maximum independence but intervene and supervise when necessary  - Involve family in performance of ADLs  - Assess for home care needs following discharge   - Consider OT consult to assist with ADL evaluation and planning for discharge  - Provide patient education as appropriate  2/13/2025 0130 by Courtney Pineda RN  Outcome: Progressing  2/13/2025 0130 by Courtney Pineda RN  Outcome: Progressing  Goal: Maintains/Returns to pre admission functional level  Description: INTERVENTIONS:  - Perform AM-PAC 6 Click Basic Mobility/ Daily Activity assessment daily.  - Set and communicate daily mobility goal to care team and patient/family/caregiver.   - Collaborate with rehabilitation services on mobility goals if consulted  - Perform Range of Motion 3 times a day.  - Reposition patient every 2 hours.  - Dangle patient 3 times a day  - Stand patient 3 times a day  - Ambulate patient 3 times a day  - Out of bed to chair 3 times a day   - Out of bed for meals 3 times a day  - Out of bed for toileting  - Record patient progress and toleration of activity level   2/13/2025 0130 by Courtney Pineda RN  Outcome: Progressing  2/13/2025 0130 by Courtney Pineda RN  Outcome: Progressing      Problem: DISCHARGE PLANNING  Goal: Discharge to home or other facility with appropriate resources  Description: INTERVENTIONS:  - Identify barriers to discharge w/patient and caregiver  - Arrange for needed discharge resources and transportation as appropriate  - Identify discharge learning needs (meds, wound care, etc.)  - Arrange for interpretive services to assist at discharge as needed  - Refer to Case Management Department for coordinating discharge planning if the patient needs post-hospital services based on physician/advanced practitioner order or complex needs related to functional status, cognitive ability, or social support system  2/13/2025 0130 by Courtney Pineda RN  Outcome: Progressing  2/13/2025 0130 by Courtney Pineda RN  Outcome: Progressing     Problem: Knowledge Deficit  Goal: Patient/family/caregiver demonstrates understanding of disease process, treatment plan, medications, and discharge instructions  Description: Complete learning assessment and assess knowledge base.  Interventions:  - Provide teaching at level of understanding  - Provide teaching via preferred learning methods  2/13/2025 0130 by Courtney Pineda RN  Outcome: Progressing  2/13/2025 0130 by Courtney Pineda RN  Outcome: Progressing     Problem: Prexisting or High Potential for Compromised Skin Integrity  Goal: Skin integrity is maintained or improved  Description: INTERVENTIONS:  - Identify patients at risk for skin breakdown  - Assess and monitor skin integrity  - Assess and monitor nutrition and hydration status  - Monitor labs   - Assess for incontinence   - Turn and reposition patient  - Assist with mobility/ambulation  - Relieve pressure over bony prominences  - Avoid friction and shearing  - Provide appropriate hygiene as needed including keeping skin clean and dry  - Evaluate need for skin moisturizer/barrier cream  - Collaborate with interdisciplinary team   - Patient/family teaching  - Consider wound care consult    2/13/2025 0130 by Courtney Pineda RN  Outcome: Progressing  2/13/2025 0130 by Courtney Pineda RN  Outcome: Progressing

## 2025-02-13 NOTE — ASSESSMENT & PLAN NOTE
Rectal tube removed, continue to monitor stool output, seems improved.    Imodium added February 12, 2025 by GI

## 2025-02-13 NOTE — PLAN OF CARE
Problem: PHYSICAL THERAPY ADULT  Goal: Performs mobility at highest level of function for planned discharge setting.  See evaluation for individualized goals.  Description: Treatment/Interventions: Functional transfer training, LE strengthening/ROM, Therapeutic exercise, Endurance training, Elevations, Bed mobility, Gait training          See flowsheet documentation for full assessment, interventions and recommendations.  Outcome: Progressing  Note: Prognosis: Good  Problem List: Decreased strength, Decreased endurance, Impaired balance, Decreased mobility, Decreased safety awareness  Assessment: Pt. seen for PT treatment session this date with interventions consisting of  toilet transfers,bed mobility, transfers and  gait training w/ emphasis on improving pt's functional activity tolerance. Pt. Requiring frequent  cues for sequence and safety. In comparison to previous session, Pt. With min improvement in activity tolerance. Continues to c/o LE pain and cramping limiting mobility.    Pt is in need of continued activity in PT to improve strength balance endurance mobility transfers and ambulation with return to maximize LOF. From PT/mobility standpoint, recommendation at time of d/c would be Level I: maximum resource intensity in order to promote return to PLOF and independence.   The patient's -[facility] Basic Mobility Inpatient Short Form Raw Score is 15. A Raw score of less than or equal to 16 suggests the patient may benefit from discharge to post-acute rehabilitation services. Pt continues to be functioning below baseline level. PT will continue to see pt during current hospitalization in order to address the deficits listed above and provide interventions consistent w/ POC in effort to achieve goals.  Please also refer to physical therapist recommendation for safe DC planning.        Rehab Resource Intensity Level, PT: I (Maximum Resource Intensity)    See flowsheet documentation for full assessment.

## 2025-02-13 NOTE — ASSESSMENT & PLAN NOTE
Anesthesia Pre-Procedure Evaluation    Patient: Aranza Garibay   MRN: [id number] : 2000        Procedure : Procedure(s):  Left ankle Open reduction internal fixation          No past medical history on file.   No past surgical history on file.   Allergies   Allergen Reactions    Amoxicillin       Social History     Tobacco Use    Smoking status: Never    Smokeless tobacco: Never   Substance Use Topics    Alcohol use: Not on file      Wt Readings from Last 1 Encounters:   23 141.5 kg (312 lb)        Anesthesia Evaluation   Pt has had prior anesthetic.     No history of anesthetic complications       ROS/MED HX  ENT/Pulmonary:    (-) sleep apnea   Neurologic:       Cardiovascular:       METS/Exercise Tolerance:     Hematologic:       Musculoskeletal:       GI/Hepatic:    (-) GERD   Renal/Genitourinary:       Endo:     (+)               Obesity (BMI 55),       Psychiatric/Substance Use:       Infectious Disease:       Malignancy:       Other:            Physical Exam    Airway        Mallampati: II   TM distance: > 3 FB   Neck ROM: full   Mouth opening: > 3 cm    Respiratory Devices and Support         Dental       (+) Minor Abnormalities - some fillings, tiny chips      Cardiovascular   cardiovascular exam normal          Pulmonary   pulmonary exam normal                OUTSIDE LABS:  CBC:   Lab Results   Component Value Date    WBC 8.3 2023    WBC 12.0 (H) 11/15/2023    HGB 14.0 2023    HGB 13.6 11/15/2023    HCT 44.0 2023    HCT 41.7 11/15/2023     2023     11/15/2023     BMP:   Lab Results   Component Value Date     2023     11/15/2023    POTASSIUM 4.6 2023    POTASSIUM 4.0 11/15/2023    CHLORIDE 103 2023    CHLORIDE 101 11/15/2023    CO2 26 2023    CO2 24 11/15/2023    BUN 13.0 2023    BUN 11.4 11/15/2023    CR 0.80 2023    CR 0.96 (H) 11/15/2023     (H) 2023    GLC 87 11/15/2023     COAGS:   Lab Results  Provide another dose of sodium phosphate 21 mmol/L over 4 hours   "  Component Value Date    PTT 24 11/15/2023    INR 1.08 11/15/2023     POC: No results found for: \"BGM\", \"HCG\", \"HCGS\"  HEPATIC: No results found for: \"ALBUMIN\", \"PROTTOTAL\", \"ALT\", \"AST\", \"GGT\", \"ALKPHOS\", \"BILITOTAL\", \"BILIDIRECT\", \"KATIE\"  OTHER:   Lab Results   Component Value Date    ETHAN 9.3 11/21/2023       Anesthesia Plan    ASA Status:  3    NPO Status:  NPO Appropriate    Anesthesia Type: General.     - Airway: ETT   Induction: RSI, Intravenous.   Maintenance: Balanced.   Techniques and Equipment:     - Airway: Video-Laryngoscope       Consents    Anesthesia Plan(s) and associated risks, benefits, and realistic alternatives discussed. Questions answered and patient/representative(s) expressed understanding.     - Discussed:     - Discussed with:  Patient            Postoperative Care    Pain management: IV analgesics, Multi-modal analgesia.   PONV prophylaxis: Ondansetron (or other 5HT-3), Background Propofol Infusion, Dexamethasone or Solumedrol     Comments:    Other Comments: Surgeon requested no PNB           Alan Contreras MD    I have reviewed the pertinent notes and labs in the chart from the past 30 days and (re)examined the patient.  Any updates or changes from those notes are reflected in this note.              # Severe Obesity: Estimated body mass index is 55.27 kg/m  as calculated from the following:    Height as of 11/21/23: 1.6 m (5' 3\").    Weight as of 11/21/23: 141.5 kg (312 lb).      "

## 2025-02-13 NOTE — PROGRESS NOTES
Progress Note - Hospitalist   Name: Emiliano Rodriguez 49 y.o. male I MRN: [id number]  Unit/Bed#: 404-01 I Date of Admission: 2/7/2025   Date of Service: 2/12/2025 I Hospital Day: 5    Assessment & Plan  Rhabdomyolysis  CK level improving    Nephrology note reviewed, continue IV fluid, monitor daily labs, check CPK in a.m.  Acute renal failure (ARF) (HCC)  Likely due to prerenal azotemia and rhabdomyolysis, IV fluid management per nephrology    Suspected component of ATN, appreciate nephrology input  Hyponatremia  Sodium level today is within normal limits, continue to monitor daily  Acute diverticulitis  Patient completed 5 days of antibiotics, will discontinue antibiotics and monitor symptoms.  Plan for outpatient colonoscopy in 6 to 8 weeks    Diarrhea  Patient with severe diarrhea , will utilize rectal tube   Start banana flakes and Probiotics  C.diff Negative  Enteric Panel negative     Weakness of left lower extremity  Patient still with significant ambulatory dysfunction and reported left lower extremity weakness and pain.    Check plain films left hip, check left femur plain films.    Patient with focal weakness, history of CVA, check MRI of the brain without contrast to rule out subacute CVA.  Abnormal LFTs  Patient with hyperbilirbuinemia and elevated LFT in the setting of rhabdomyolysis and ETOH use    Likely multifactorial including alcohol use, fatty liver disease, rhabdomyolysis.    Gastroesophageal reflux disease without esophagitis  Protonix 40 mg daily    Outpatient EGD per GI  Alcohol abuse  Endorses at least 5 beers a day, although I suspect this is understated  Folic acid, thiamine  Initiate CIWA protocol  Hypokalemia  Monitor potassium level daily  Volume depletion  Continue IV fluid, encourage p.o. intake  Acute tubular necrosis (HCC)  Suspected component of ATN, nephrology consult appreciated.  Hypomagnesemia    Hypophosphatemia      VTE Pharmacologic Prophylaxis:   Moderate Risk (Score 3-4) -  Pharmacological DVT Prophylaxis Ordered: heparin.    Mobility:   Basic Mobility Inpatient Raw Score: 13  JH-HLM Goal: 4: Move to chair/commode  JH-HLM Achieved: 7: Walk 25 feet or more  JH-HLM Goal achieved. Continue to encourage appropriate mobility.    Patient Centered Rounds: I performed bedside rounds with nursing staff today.   Discussions with Specialists or Other Care Team Provider: Case management    Education and Discussions with Family / Patient: Patient declined call to .     Current Length of Stay: 5 day(s)  Current Patient Status: Inpatient   Certification Statement: The patient will continue to require additional inpatient hospital stay due to requires continuous IV fluid for treatment of rhabdomyolysis  Discharge Plan: Anticipate discharge in >72 hrs to home.    Code Status: Level 1 - Full Code    Subjective   Still with weakness left leg, bilateral thigh pain    Objective :  Temp:  [98.1 °F (36.7 °C)-98.3 °F (36.8 °C)] 98.1 °F (36.7 °C)  HR:  [84-90] 90  BP: (113-140)/(85-97) 113/90  Resp:  [13] 13  SpO2:  [91 %-98 %] 96 %  O2 Device: None (Room air)    Body mass index is 32.35 kg/m².     Input and Output Summary (last 24 hours):     Intake/Output Summary (Last 24 hours) at 2/12/2025 1913  Last data filed at 2/12/2025 1718  Gross per 24 hour   Intake 1440 ml   Output 3450 ml   Net -2010 ml       Physical Exam  Vitals and nursing note reviewed.   HENT:      Head: Normocephalic and atraumatic.   Cardiovascular:      Rate and Rhythm: Normal rate.      Pulses: Normal pulses.   Pulmonary:      Effort: Pulmonary effort is normal.   Musculoskeletal:         General: Normal range of motion.   Skin:     General: Skin is warm.   Neurological:      Mental Status: He is alert. Mental status is at baseline.   Psychiatric:         Mood and Affect: Mood normal.         Behavior: Behavior normal.         Judgment: Judgment normal.           Lines/Drains:  Lines/Drains/Airways       Active Status        Name Placement date Placement time Site Days    Rectal Tube 02/08/25  1018  --  4                            Lab Results: I have reviewed the following results:   Results from last 7 days   Lab Units 02/12/25  0541   WBC Thousand/uL 8.89   HEMOGLOBIN g/dL 9.8*   HEMATOCRIT % 30.0*   PLATELETS Thousands/uL 134*   SEGS PCT % 76*   LYMPHO PCT % 11*   MONO PCT % 10   EOS PCT % 1     Results from last 7 days   Lab Units 02/12/25  0541   SODIUM mmol/L 134*   POTASSIUM mmol/L 3.4*   CHLORIDE mmol/L 106   CO2 mmol/L 23   BUN mg/dL 33*   CREATININE mg/dL 4.44*   ANION GAP mmol/L 5   CALCIUM mg/dL 7.3*   ALBUMIN g/dL 2.2*   TOTAL BILIRUBIN mg/dL 2.04*   ALK PHOS U/L 113*   ALT U/L 286*   AST U/L 471*   GLUCOSE RANDOM mg/dL 85     Results from last 7 days   Lab Units 02/12/25  0541   INR  1.28*             Results from last 7 days   Lab Units 02/07/25  0453   LACTIC ACID mmol/L 1.3   PROCALCITONIN ng/ml 3.09*       Recent Cultures (last 7 days):   Results from last 7 days   Lab Units 02/07/25  0906   C DIFF TOXIN B BY PCR  Negative       Imaging Results Review: I reviewed radiology reports from this admission including: MRI brain.  Other Study Results Review: No additional pertinent studies reviewed.    Last 24 Hours Medication List:     Current Facility-Administered Medications:     acetaminophen (TYLENOL) tablet 650 mg, Q6H PRN    aluminum-magnesium hydroxide-simethicone (MAALOX) oral suspension 30 mL, Q4H PRN    clopidogrel (PLAVIX) tablet 75 mg, Daily    folic acid (FOLVITE) tablet 1 mg, Daily    heparin (porcine) subcutaneous injection 5,000 Units, Q8H DAVION **AND** [COMPLETED] Platelet count, Once    loperamide (IMODIUM) capsule 2 mg, BID    metoprolol succinate (TOPROL-XL) 24 hr tablet 25 mg, Daily    nicotine (NICODERM CQ) 21 mg/24 hr TD 24 hr patch 1 patch, Daily    pantoprazole (PROTONIX) EC tablet 40 mg, Early Morning    saccharomyces boulardii (FLORASTOR) capsule 250 mg, BID    sodium bicarbonate 150 mEq in dextrose  5 % 1,000 mL infusion, Continuous, Last Rate: 75 mL/hr (02/12/25 1715)    sodium chloride 0.9 % infusion, Continuous, Last Rate: 100 mL/hr (02/12/25 1715)    thiamine (VITAMIN B1) 500 mg in sodium chloride 0.9 % 50 mL IVPB, Q8H DAVION, Last Rate: 500 mg (02/12/25 1714)    traZODone (DESYREL) tablet 50 mg, HS PRN    Administrative Statements   Today, Patient Was Seen By: Isaías Eller DO      **Please Note: This note may have been constructed using a voice recognition system.**

## 2025-02-13 NOTE — PHYSICAL THERAPY NOTE
PHYSICAL THERAPY NOTE          Patient Name: Emiliano Rodriguez  Today's Date: 2/13/2025 02/13/25 1041   PT Last Visit   PT Visit Date 02/13/25   Note Type   Note Type Treatment   Pain Assessment   Pain Assessment Tool FLACC   Pain Location/Orientation Orientation: Bilateral;Location: Leg  (thighs calves)   Restrictions/Precautions   Weight Bearing Precautions Per Order No   Other Precautions Chair Alarm;Bed Alarm;Multiple lines;Fall Risk;Impulsive   General   Chart Reviewed Yes   Response to Previous Treatment Patient reporting fatigue but able to participate.   Family/Caregiver Present No   Cognition   Overall Cognitive Status WFL   Arousal/Participation Alert;Cooperative   Attention Within functional limits   Orientation Level Oriented X4   Following Commands Follows all commands and directions without difficulty   Subjective   Subjective c/o B thigh and calf pain/cramping with ambulation   Bed Mobility   Supine to Sit 4  Minimal assistance   Additional items Assist x 1;HOB elevated;Bedrails;Increased time required;Verbal cues   Additional Comments Increased time to transition to EOB. Sat EOB with fair+ sitting balance   Transfers   Sit to Stand 4  Minimal assistance   Additional items Assist x 1;Armrests;Bedrails;Increased time required;Verbal cues   Stand to Sit 4  Minimal assistance   Additional items Assist x 1;Armrests;Increased time required;Verbal cues   Stand pivot 4  Minimal assistance   Additional items Impulsive;Verbal cues   Toilet transfer 4  Minimal assistance   Additional items Verbal cues;Impulsive;Commode   Additional Comments RW used. Impulsive to sit on toilet before complete full pivot to commode. Stood with fair- balance for clothing management and hygiene. Sit<>stand tx x 7  with cues safety and sequence.   Ambulation/Elevation   Gait pattern Excessively slow;Short stride;Foward flexed;Decreased foot  clearance;Improper Weight shift;Antalgic   Gait Assistance 4  Minimal assist   Additional items Assist x 1;Verbal cues   Assistive Device Rolling walker   Distance 22' x 1, 5' x 2  (Required chair in hallway due to LE cramping/pain)   Balance   Static Sitting Fair +   Dynamic Sitting Fair +   Static Standing Fair -   Dynamic Standing Fair -   Ambulatory Poor +  (RW)   Endurance Deficit   Endurance Deficit Yes   Activity Tolerance   Activity Tolerance Patient limited by pain;Patient limited by fatigue   Assessment   Prognosis Good   Problem List Decreased strength;Decreased endurance;Impaired balance;Decreased mobility;Decreased safety awareness   Assessment Pt. seen for PT treatment session this date with interventions consisting of  toilet transfers,bed mobility, transfers and  gait training w/ emphasis on improving pt's functional activity tolerance. Pt. Requiring frequent  cues for sequence and safety. In comparison to previous session, Pt. With min improvement in activity tolerance. Continues to c/o LE pain and cramping limiting mobility.    Pt is in need of continued activity in PT to improve strength balance endurance mobility transfers and ambulation with return to maximize LOF. From PT/mobility standpoint, recommendation at time of d/c would be Level I: maximum resource intensity in order to promote return to PLOF and independence.   The patient's AM-PAC Basic Mobility Inpatient Short Form Raw Score is 15. A Raw score of less than or equal to 16 suggests the patient may benefit from discharge to post-acute rehabilitation services. Pt continues to be functioning below baseline level. PT will continue to see pt during current hospitalization in order to address the deficits listed above and provide interventions consistent w/ POC in effort to achieve goals.  Please also refer to physical therapist recommendation for safe DC planning.   Goals   Patient Goals to get better   LTG Expiration Date 02/24/25   PT  Treatment Day 3   Plan   Treatment/Interventions Functional transfer training;LE strengthening/ROM;Therapeutic exercise;Endurance training;Elevations;Bed mobility;Gait training   Progress Progressing toward goals   PT Frequency 3-5x/wk   Discharge Recommendation   Rehab Resource Intensity Level, PT I (Maximum Resource Intensity)   AM-PAC Basic Mobility Inpatient   Turning in Flat Bed Without Bedrails 3   Lying on Back to Sitting on Edge of Flat Bed Without Bedrails 3   Moving Bed to Chair 3   Standing Up From Chair Using Arms 3   Walk in Room 2   Climb 3-5 Stairs With Railing 1   Basic Mobility Inpatient Raw Score 15   Basic Mobility Standardized Score 36.97   [facility] Highest Level Of Mobility   -[facility] Goal 4: Move to chair/commode   -[facility] Achieved 6: Walk 10 steps or more   Education   Education Provided Mobility training   Patient Demonstrates verbal understanding;Reinforcement needed   End of Consult   Patient Position at End of Consult Bedside chair;Bed/Chair alarm activated;All needs within reach   End of Consult Comments discussed POC with PT   Co-treat with OT this session due to complexity of pt and requires A x 2 to provided skilled interventions.

## 2025-02-13 NOTE — NURSING NOTE
"Nurse called into pt room, made aware by PCA present that pt rectal tube came out. Per pt, pt felt he had to fart, did, and the tube \"just came out\". Upon inspection, balloon of rectal tube still inflated. No outward signs of injury on assessment, no complaints from pt. Per pt, feels better with tube out, no pain, and does not want it back in. Pt still with diarrhea, frequency lessening.  "

## 2025-02-13 NOTE — ASSESSMENT & PLAN NOTE
Slightly adjust volume resuscitation for developing edema, lessening stool output, improving bicarbonate level

## 2025-02-13 NOTE — ASSESSMENT & PLAN NOTE
Etiology is attributed to acute tubular necrosis from volume depletion and intrinsic causes from rhabdomyolysis.Labs this morning show improving creatinine from 4.4 to 4.2 mg/dL.  Patient net -1 L, 1.4 L of stool and 2.9 L of urine output yesterday.  Patient developing signs of edema and legs, continue with normal saline at 100 cc an hour for rhabdomyolysis but decrease sodium bicarb to 50 cc an hour as stool output improving.  Will need to continue to optimize nutritional status with phosphorus being 2.5 and magnesium being 1.7 with calcium 7.4.

## 2025-02-13 NOTE — OCCUPATIONAL THERAPY NOTE
Occupational Therapy Progress Note     Patient Name: Emiliano Rodriguez  Today's Date: 2/13/2025  Problem List  Principal Problem:    Rhabdomyolysis  Active Problems:    Gastroesophageal reflux disease without esophagitis    Alcohol abuse    Acute renal failure (ARF) (HCC)    Hyponatremia    Abnormal LFTs    Acute diverticulitis    Diarrhea    Hypokalemia    Volume depletion    Acute tubular necrosis (HCC)    Weakness of left lower extremity    Hypomagnesemia    Hypophosphatemia            02/13/25 1042   OT Last Visit   OT Visit Date 02/13/25   Note Type   Note Type Treatment   Pain Assessment   Pain Assessment Tool FLACC   Pain Rating: FLACC (Rest) - Face 0   Pain Rating: FLACC (Rest) - Legs 0   Pain Rating: FLACC (Rest) - Activity 0   Pain Rating: FLACC (Rest) - Cry 0   Pain Rating: FLACC (Rest) - Consolability 0   Score: FLACC (Rest) 0   Pain Rating: FLACC (Activity) - Face 1   Pain Rating: FLACC (Activity) - Legs 0   Pain Rating: FLACC (Activity) - Activity 0   Pain Rating: FLACC (Activity) - Cry 0   Pain Rating: FLACC (Activity) - Consolability 0   Score: FLACC (Activity) 1   Restrictions/Precautions   Weight Bearing Precautions Per Order No   Other Precautions Chair Alarm;Bed Alarm;Multiple lines;Fall Risk;Impulsive   ADL   Where Assessed Commode   UB Bathing Assistance 5  Supervision/Setup   LB Bathing Assistance 3  Moderate Assistance   UB Dressing Assistance 5  Supervision/Setup   LB Dressing Assistance 3  Moderate Assistance   Toileting Assistance  2  Maximal Assistance   Toileting Deficit Impulsive;Steadying;Verbal cueing;Increased time to complete;Bedside commode;Clothing management up;Clothing management down;Perineal hygiene   Toileting Comments Pt limited by deficits in standing balance, functional endurance, strength, and decreased safety awareness.   Bed Mobility   Additional Comments Pt OOB in chair at start/end of session, all needs witihn reach.   Transfers   Sit to Stand 4  Minimal assistance  "  Additional items Assist x 1;Armrests;Increased time required;Verbal cues   Stand to Sit 4  Minimal assistance   Additional items Assist x 1;Armrests;Increased time required;Verbal cues   Stand pivot 4  Minimal assistance   Additional items Assist x 1;Armrests;Increased time required;Verbal cues   Toilet transfer 4  Minimal assistance   Additional items Assist x 1;Armrests;Increased time required;Verbal cues;Commode   Additional Comments RW used   Subjective   Subjective \"I can't take my hands off the walker.\"   Cognition   Overall Cognitive Status WFL   Arousal/Participation Alert;Cooperative   Attention Within functional limits   Orientation Level Oriented X4   Memory Within functional limits   Following Commands Follows all commands and directions without difficulty   Activity Tolerance   Activity Tolerance Patient limited by fatigue   Assessment   Assessment Patient participated in Skilled OT session this date with interventions consisting of ADL re training with the use of correct body mechnaics, Energy Conservation techniques, Work simplification skills , safety awareness and fall prevention techniques,  therapeutic activities to: increase activity tolerance, increase cardiovascular endurance , and increase dynamic sit/ stand balance during functional activity  . Co treatment with PT secondary to complex medical condition of pt, mod-max A of 2 required to achieve and maintain transitional movements, requiring the need of skilled therapeutic intervention of 2 therapists to achieve delivery of services. Patient agreeable to OT treatment session, upon arrival patient was found seated OOB to Chair. Patient requiring verbal cues for safety and frequent rest periods. Patient continues to be functioning below baseline level, occupational performance remains limited secondary to factors listed above and increased risk for falls and injury. The patient's raw score on the AM-PAC Daily Activity Inpatient Short Form is " 17. A raw score of less than 19 suggests the patient may benefit from discharge to post-acute rehabilitation services. Please refer to the recommendation of the Occupational Therapist for safe discharge planning. From OT standpoint, recommendation at time of d/c would be level 1,max resource intensity.   Plan   Treatment Interventions ADL retraining;Functional transfer training;Endurance training;Compensatory technique education;Energy conservation;Activityengagement   Goal Expiration Date 02/24/25   OT Treatment Day 1   OT Frequency 3-5x/wk   Discharge Recommendation   Rehab Resource Intensity Level, OT I (Maximum Resource Intensity)   AM-PAC Daily Activity Inpatient   Lower Body Dressing 2   Bathing 2   Toileting 2   Upper Body Dressing 3   Grooming 4   Eating 4   Daily Activity Raw Score 17   Daily Activity Standardized Score (Calc for Raw Score >=11) 37.26   AM-PAC Applied Cognition Inpatient   Following a Speech/Presentation 4   Understanding Ordinary Conversation 4   Taking Medications 4   Remembering Where Things Are Placed or Put Away 4   Remembering List of 4-5 Errands 4   Taking Care of Complicated Tasks 4   Applied Cognition Raw Score 24   Applied Cognition Standardized Score 62.21     Pt will benefit from continued OT services in order to maximize (I) c ADL performance, FM c least restrictive AD, and improve overall endurance/strength required to complete functional tasks in preparation for d/c.    Pt benefited from co-treatment of skilled OT and PTA in order to most appropriately address functional deficits d/t extensive assistance required for safe functional mobility, decreased activity tolerance, and regression from functioning level prior to admission and/or onset of present illness. OT/PT objectives were addressed separately; please see PT note for specific goal areas targeted.    Pt left seated in chair at end of session; all needs within reach; all lines intact.    Desiree Atwood OTVICTOR MNAUEL/L

## 2025-02-13 NOTE — PLAN OF CARE
Problem: RESPIRATORY - ADULT  Goal: Achieves optimal ventilation and oxygenation  Description: INTERVENTIONS:  - Assess for changes in respiratory status  - Assess for changes in mentation and behavior  - Position to facilitate oxygenation and minimize respiratory effort  - Oxygen administered by appropriate delivery if ordered  - Initiate smoking cessation education as indicated  - Encourage broncho-pulmonary hygiene including cough, deep breathe, Incentive Spirometry  - Assess the need for suctioning and aspirate as needed  - Assess and instruct to report SOB or any respiratory difficulty  - Respiratory Therapy support as indicated  Outcome: Progressing     Problem: SKIN/TISSUE INTEGRITY - ADULT  Goal: Skin Integrity remains intact(Skin Breakdown Prevention)  Description: Assess:  -Perform Tani assessment every 2  -Clean and moisturize skin every incont. Episode   -Inspect skin when repositioning, toileting, and assisting with ADLS  -Assess extremities for adequate circulation and sensation     Bed Management:  -Have minimal linens on bed & keep smooth, unwrinkled  -Change linens as needed when moist or perspiring  -Avoid sitting or lying in one position for more than 2 hours while in bed  -Keep HOB at 30 degrees     Toileting:  -Offer bedside commode  -Assess for incontinence every shift  -Use incontinent care products after each incontinent episode such as alejandra wipes    Activity:  -Mobilize patient 3 times a day  -Encourage activity and walks on unit  -Encourage or provide ROM exercises   -Turn and reposition patient every 2 Hours  -Use appropriate equipment to lift or move patient in bed  -Instruct/ Assist with weight shifting every 60 mins when out of bed in chair  -Consider limitation of chair time 6 hour intervals    Skin Care:  -Avoid use of baby powder, tape, friction and shearing, hot water or constrictive clothing  -Relieve pressure over bony prominences using foam wedges/pillows  -Do not massage red  bony areas    Outcome: Progressing     Problem: MUSCULOSKELETAL - ADULT  Goal: Maintain or return mobility to safest level of function  Description: INTERVENTIONS:  - Assess patient's ability to carry out ADLs; assess patient's baseline for ADL function and identify physical deficits which impact ability to perform ADLs (bathing, care of mouth/teeth, toileting, grooming, dressing, etc.)  - Assess/evaluate cause of self-care deficits   - Assess range of motion  - Assess patient's mobility  - Assess patient's need for assistive devices and provide as appropriate  - Encourage maximum independence but intervene and supervise when necessary  - Involve family in performance of ADLs  - Assess for home care needs following discharge   - Consider OT consult to assist with ADL evaluation and planning for discharge  - Provide patient education as appropriate  Outcome: Progressing     Problem: PAIN - ADULT  Goal: Verbalizes/displays adequate comfort level or baseline comfort level  Description: Interventions:  - Encourage patient to monitor pain and request assistance  - Assess pain using appropriate pain scale  - Administer analgesics based on type and severity of pain and evaluate response  - Implement non-pharmacological measures as appropriate and evaluate response  - Consider cultural and social influences on pain and pain management  - Notify physician/advanced practitioner if interventions unsuccessful or patient reports new pain  Outcome: Progressing     Problem: INFECTION - ADULT  Goal: Absence or prevention of progression during hospitalization  Description: INTERVENTIONS:  - Assess and monitor for signs and symptoms of infection  - Monitor lab/diagnostic results  - Monitor all insertion sites, i.e. indwelling lines, tubes, and drains  - Monitor endotracheal if appropriate and nasal secretions for changes in amount and color  - Zachary appropriate cooling/warming therapies per order  - Administer medications as  ordered  - Instruct and encourage patient and family to use good hand hygiene technique  - Identify and instruct in appropriate isolation precautions for identified infection/condition  Outcome: Progressing     Problem: SAFETY ADULT  Goal: Patient will remain free of falls  Description: INTERVENTIONS:  - Educate patient/family on patient safety including physical limitations  - Instruct patient to call for assistance with activity   - Consult OT/PT to assist with strengthening/mobility   - Keep Call bell within reach  - Keep bed low and locked with side rails adjusted as appropriate  - Keep care items and personal belongings within reach  - Initiate and maintain comfort rounds  - Make Fall Risk Sign visible to staff  - Offer Toileting every 2 Hours, in advance of need  - Initiate/Maintain bed/chair alarm  - Obtain necessary fall risk management equipment: non skid socks  - Apply yellow socks and bracelet for high fall risk patients  - Consider moving patient to room near nurses station  Outcome: Progressing  Goal: Maintain or return to baseline ADL function  Description: INTERVENTIONS:  -  Assess patient's ability to carry out ADLs; assess patient's baseline for ADL function and identify physical deficits which impact ability to perform ADLs (bathing, care of mouth/teeth, toileting, grooming, dressing, etc.)  - Assess/evaluate cause of self-care deficits   - Assess range of motion  - Assess patient's mobility; develop plan if impaired  - Assess patient's need for assistive devices and provide as appropriate  - Encourage maximum independence but intervene and supervise when necessary  - Involve family in performance of ADLs  - Assess for home care needs following discharge   - Consider OT consult to assist with ADL evaluation and planning for discharge  - Provide patient education as appropriate  Outcome: Progressing  Goal: Maintains/Returns to pre admission functional level  Description: INTERVENTIONS:  - Perform  AM-PAC 6 Click Basic Mobility/ Daily Activity assessment daily.  - Set and communicate daily mobility goal to care team and patient/family/caregiver.   - Collaborate with rehabilitation services on mobility goals if consulted  - Perform Range of Motion 3 times a day.  - Reposition patient every 2 hours.  - Dangle patient 3 times a day  - Stand patient 3 times a day  - Ambulate patient 3 times a day  - Out of bed to chair 3 times a day   - Out of bed for meals 3 times a day  - Out of bed for toileting  - Record patient progress and toleration of activity level   Outcome: Progressing     Problem: DISCHARGE PLANNING  Goal: Discharge to home or other facility with appropriate resources  Description: INTERVENTIONS:  - Identify barriers to discharge w/patient and caregiver  - Arrange for needed discharge resources and transportation as appropriate  - Identify discharge learning needs (meds, wound care, etc.)  - Arrange for interpretive services to assist at discharge as needed  - Refer to Case Management Department for coordinating discharge planning if the patient needs post-hospital services based on physician/advanced practitioner order or complex needs related to functional status, cognitive ability, or social support system  Outcome: Progressing     Problem: Knowledge Deficit  Goal: Patient/family/caregiver demonstrates understanding of disease process, treatment plan, medications, and discharge instructions  Description: Complete learning assessment and assess knowledge base.  Interventions:  - Provide teaching at level of understanding  - Provide teaching via preferred learning methods  Outcome: Progressing     Problem: Prexisting or High Potential for Compromised Skin Integrity  Goal: Skin integrity is maintained or improved  Description: INTERVENTIONS:  - Identify patients at risk for skin breakdown  - Assess and monitor skin integrity  - Assess and monitor nutrition and hydration status  - Monitor labs   -  Assess for incontinence   - Turn and reposition patient  - Assist with mobility/ambulation  - Relieve pressure over bony prominences  - Avoid friction and shearing  - Provide appropriate hygiene as needed including keeping skin clean and dry  - Evaluate need for skin moisturizer/barrier cream  - Collaborate with interdisciplinary team   - Patient/family teaching  - Consider wound care consult   Outcome: Progressing     Problem: GASTROINTESTINAL - ADULT  Goal: Maintains or returns to baseline bowel function  Description: INTERVENTIONS:  - Assess bowel function  - Encourage oral fluids to ensure adequate hydration  - Administer IV fluids if ordered to ensure adequate hydration  - Administer ordered medications as needed  - Encourage mobilization and activity  - Consider nutritional services referral to assist patient with adequate nutrition and appropriate food choices  Outcome: Progressing

## 2025-02-13 NOTE — PROGRESS NOTES
Progress Note - Hospitalist   Name: Emiliano Rodriguez 49 y.o. male I MRN: [id number]  Unit/Bed#: 404-01 I Date of Admission: 2/7/2025   Date of Service: 2/13/2025 I Hospital Day: 6    Assessment & Plan  Rhabdomyolysis  CK level improving    Continue IV fluid per nephrology, case discussed today with nephrology  Acute renal failure (ARF) (HCC)  Likely due to prerenal azotemia and rhabdomyolysis, IV fluid management per nephrology    Suspected component of ATN, appreciate nephrology input    Creatinine trending down from 4.44-4.20  Hyponatremia  Sodium level remains within normal limits  Acute diverticulitis  Patient completed 5 days of antibiotics, will discontinue antibiotics and monitor symptoms.  Plan for outpatient colonoscopy in 6 to 8 weeks    Diarrhea  Rectal tube removed, continue to monitor stool output, seems improved.    Imodium added February 12, 2025 by GI  Weakness of left lower extremity  MRI negative for acute CVA, muscle strength seems to be improving, continue IV thiamine.  Abnormal LFTs  Patient with hyperbilirbuinemia and elevated LFT in the setting of rhabdomyolysis and ETOH use    Likely multifactorial including alcohol use, fatty liver disease, rhabdomyolysis.    Check repeat levels tomorrow February 14, 2025    Gastroesophageal reflux disease without esophagitis  Protonix 40 mg daily    Outpatient EGD per GI  Alcohol abuse  Endorses at least 5 beers a day, although I suspect this is understated  Folic acid, thiamine  Patient did not exhibit any signs of acute alcohol withdrawal, CIWA protocol was not needed, it has been discontinued.  Hypokalemia  Monitor potassium level daily  Volume depletion  Continue IV fluid, encourage p.o. intake  Acute tubular necrosis (HCC)  Suspected component of ATN, nephrology consult appreciated.  Hypomagnesemia  IV magnesium ordered today  Hypophosphatemia  IV K-Phos ordered today    VTE Pharmacologic Prophylaxis:   Moderate Risk (Score 3-4) - Pharmacological DVT  Prophylaxis Ordered: heparin.    Mobility:   Basic Mobility Inpatient Raw Score: 13  JH-HLM Goal: 4: Move to chair/commode  JH-HLM Achieved: 3: Sit at edge of bed  JH-HLM Goal NOT achieved. Continue with multidisciplinary rounding and encourage appropriate mobility to improve upon JH-HLM goals.    Patient Centered Rounds: I performed bedside rounds with nursing staff today.   Discussions with Specialists or Other Care Team Provider: Nephrology    Education and Discussions with Family / Patient: Patient declined call to .     Current Length of Stay: 6 day(s)  Current Patient Status: Inpatient   Certification Statement: The patient will continue to require additional inpatient hospital stay due to patient needs continuous IV fluid.  Discharge Plan: Anticipate discharge in 24-48 hrs to home.    Code Status: Level 1 - Full Code    Subjective   Patient notes decreased pain, slight improvement in diarrhea, improved mobility.    Objective :  Temp:  [97.6 °F (36.4 °C)-98.1 °F (36.7 °C)] 97.6 °F (36.4 °C)  HR:  [79-96] 96  BP: (113-150)/() 128/94  Resp:  [13-18] 18  SpO2:  [95 %-98 %] 95 %  O2 Device: None (Room air)    Body mass index is 32.35 kg/m².     Input and Output Summary (last 24 hours):     Intake/Output Summary (Last 24 hours) at 2/13/2025 1046  Last data filed at 2/13/2025 0852  Gross per 24 hour   Intake 2190 ml   Output 3875 ml   Net -1685 ml       Physical Exam  Vitals and nursing note reviewed.   Constitutional:       General: He is not in acute distress.     Appearance: He is not ill-appearing, toxic-appearing or diaphoretic.   HENT:      Head: Normocephalic and atraumatic.   Cardiovascular:      Rate and Rhythm: Normal rate.      Pulses: Normal pulses.   Pulmonary:      Effort: Pulmonary effort is normal.   Abdominal:      General: Bowel sounds are normal. There is no distension.      Palpations: Abdomen is soft.      Tenderness: There is no abdominal tenderness.   Musculoskeletal:          General: Normal range of motion.      Cervical back: Normal range of motion.      Right lower leg: Edema present.      Left lower leg: Edema present.   Neurological:      General: No focal deficit present.      Mental Status: He is alert and oriented to person, place, and time. Mental status is at baseline.      Cranial Nerves: No cranial nerve deficit.   Psychiatric:         Mood and Affect: Mood normal.         Behavior: Behavior normal.         Thought Content: Thought content normal.         Judgment: Judgment normal.                       Lab Results: I have reviewed the following results:   Results from last 7 days   Lab Units 02/12/25  0541   WBC Thousand/uL 8.89   HEMOGLOBIN g/dL 9.8*   HEMATOCRIT % 30.0*   PLATELETS Thousands/uL 134*   SEGS PCT % 76*   LYMPHO PCT % 11*   MONO PCT % 10   EOS PCT % 1     Results from last 7 days   Lab Units 02/13/25  0800 02/12/25  0541   SODIUM mmol/L 137 134*   POTASSIUM mmol/L 3.6 3.4*   CHLORIDE mmol/L 106 106   CO2 mmol/L 23 23   BUN mg/dL 29* 33*   CREATININE mg/dL 4.20* 4.44*   ANION GAP mmol/L 8 5   CALCIUM mg/dL 7.4* 7.3*   ALBUMIN g/dL  --  2.2*   TOTAL BILIRUBIN mg/dL  --  2.04*   ALK PHOS U/L  --  113*   ALT U/L  --  286*   AST U/L  --  471*   GLUCOSE RANDOM mg/dL 113 85     Results from last 7 days   Lab Units 02/12/25  0541   INR  1.28*             Results from last 7 days   Lab Units 02/07/25  0453   LACTIC ACID mmol/L 1.3   PROCALCITONIN ng/ml 3.09*       Recent Cultures (last 7 days):   Results from last 7 days   Lab Units 02/07/25  0906   C DIFF TOXIN B BY PCR  Negative       Imaging Results Review: No pertinent imaging studies reviewed.  Other Study Results Review: No additional pertinent studies reviewed.    Last 24 Hours Medication List:     Current Facility-Administered Medications:     acetaminophen (TYLENOL) tablet 650 mg, Q6H PRN    aluminum-magnesium hydroxide-simethicone (MAALOX) oral suspension 30 mL, Q4H PRN    clopidogrel (PLAVIX) tablet 75 mg,  Daily    folic acid (FOLVITE) tablet 1 mg, Daily    heparin (porcine) subcutaneous injection 5,000 Units, Q8H DAVION **AND** [COMPLETED] Platelet count, Once    loperamide (IMODIUM) capsule 2 mg, BID    magnesium sulfate 2 g/50 mL IVPB (premix) 2 g, Once, Last Rate: 2 g (02/13/25 1037)    metoprolol succinate (TOPROL-XL) 24 hr tablet 25 mg, Daily    nicotine (NICODERM CQ) 21 mg/24 hr TD 24 hr patch 1 patch, Daily    pantoprazole (PROTONIX) EC tablet 40 mg, Early Morning    potassium chloride (Klor-Con M20) CR tablet 20 mEq, Q3H    saccharomyces boulardii (FLORASTOR) capsule 250 mg, BID    sodium bicarbonate 150 mEq in dextrose 5 % 1,000 mL infusion, Continuous, Last Rate: 50 mL/hr (02/13/25 1036)    sodium chloride 0.9 % infusion, Continuous, Last Rate: 100 mL/hr (02/13/25 0435)    sodium phosphate 21 mmol in dextrose 5 % 250 mL Infusion, Once    thiamine (VITAMIN B1) 500 mg in sodium chloride 0.9 % 50 mL IVPB, Q8H DAVION, Last Rate: 500 mg (02/13/25 0853)    traZODone (DESYREL) tablet 50 mg, HS PRN    Administrative Statements   Today, Patient Was Seen By: Isaías Eller DO      **Please Note: This note may have been constructed using a voice recognition system.**

## 2025-02-13 NOTE — ASSESSMENT & PLAN NOTE
Patient with hyperbilirbuinemia and elevated LFT in the setting of rhabdomyolysis and ETOH use    Likely multifactorial including alcohol use, fatty liver disease, rhabdomyolysis.    Check repeat levels tomorrow February 14, 2025

## 2025-02-13 NOTE — ASSESSMENT & PLAN NOTE
Endorses at least 5 beers a day, although I suspect this is understated  Folic acid, thiamine  Patient did not exhibit any signs of acute alcohol withdrawal, CIWA protocol was not needed, it has been discontinued.

## 2025-02-13 NOTE — PLAN OF CARE
Problem: OCCUPATIONAL THERAPY ADULT  Goal: Performs self-care activities at highest level of function for planned discharge setting.  See evaluation for individualized goals.  Description: Treatment Interventions: ADL retraining, Functional transfer training, UE strengthening/ROM, Endurance training, Patient/family training, Equipment evaluation/education, Activityengagement          See flowsheet documentation for full assessment, interventions and recommendations.   Outcome: Progressing  Note: Limitation: Decreased ADL status, Decreased UE strength, Decreased Safe judgement during ADL, Decreased endurance, Decreased self-care trans, Decreased high-level ADLs     Assessment: Patient participated in Skilled OT session this date with interventions consisting of ADL re training with the use of correct body mechnaics, Energy Conservation techniques, Work simplification skills , safety awareness and fall prevention techniques,  therapeutic activities to: increase activity tolerance, increase cardiovascular endurance , and increase dynamic sit/ stand balance during functional activity  . Co treatment with PT secondary to complex medical condition of pt, mod-max A of 2 required to achieve and maintain transitional movements, requiring the need of skilled therapeutic intervention of 2 therapists to achieve delivery of services. Patient agreeable to OT treatment session, upon arrival patient was found seated OOB to Chair. Patient requiring verbal cues for safety and frequent rest periods. Patient continues to be functioning below baseline level, occupational performance remains limited secondary to factors listed above and increased risk for falls and injury. The patient's raw score on the AM-PAC Daily Activity Inpatient Short Form is 16. A raw score of less than 19 suggests the patient may benefit from discharge to post-acute rehabilitation services. Please refer to the recommendation of the Occupational Therapist for  safe discharge planning. From OT standpoint, recommendation at time of d/c would be level 1,max resource intensity.     Rehab Resource Intensity Level, OT: I (Maximum Resource Intensity)

## 2025-02-13 NOTE — ASSESSMENT & PLAN NOTE
CK level improving    Nephrology note reviewed, continue IV fluid, monitor daily labs, check CPK in a.m.

## 2025-02-13 NOTE — PROGRESS NOTES
Progress Note - Nephrology   Name: Emiliano Rodriguez 49 y.o. male I MRN: [id number]  Unit/Bed#: 404-01 I Date of Admission: 2/7/2025   Date of Service: 2/13/2025 I Hospital Day: 6     Assessment & Plan  Acute renal failure (ARF) (HCC)  Etiology is attributed to acute tubular necrosis from volume depletion and intrinsic causes from rhabdomyolysis.Labs this morning show improving creatinine from 4.4 to 4.2 mg/dL.  Patient net -1 L, 1.4 L of stool and 2.9 L of urine output yesterday.  Patient developing signs of edema and legs, continue with normal saline at 100 cc an hour for rhabdomyolysis but decrease sodium bicarb to 50 cc an hour as stool output improving.  Will need to continue to optimize nutritional status with phosphorus being 2.5 and magnesium being 1.7 with calcium 7.4.  Acute tubular necrosis (HCC)  Continue supportive care at this time  Hyponatremia  Mild hyponatremia improved on labs today  Hypokalemia  Patient's status post potassium chloride 40 mEq twice daily yesterday.  Provide another 60 mEq today to cover for stool losses  Volume depletion  Slightly adjust volume resuscitation for developing edema, lessening stool output, improving bicarbonate level  Diarrhea  Continue with diarrhea management per gastroenterology  Hypomagnesemia  Provide a dose of IV magnesium sulfate over 4 hours  Hypophosphatemia  Provide another dose of sodium phosphate 21 mmol/L over 4 hours  Rhabdomyolysis  CK downtrending from 11,000-7000  Gastroesophageal reflux disease without esophagitis  Continue with PPI, potential endoscopy going forward  Alcohol abuse  Continue [facility] protocol, further care and management according to hospitalist recommendations.  Abnormal LFTs  Potentially related to alcohol intake, will be following with gastroenterology.  Acute diverticulitis  Status post course of ceftriaxone and metronidazole  Weakness of left lower extremity          Subjective   Brief History of Admission - 49 y.o. year old male  with a past medical history of hypertension, CVA, chronic diastolic congestive heart failure, alcohol use, GERD, tobacco use who was admitted to [facility] after presenting with weakness to bilateral lower extremities.  Nephrology consulted for GUNJAN.  Patient seen and examined tolerating some of his oral intake        Objective :  Temp:  [97.6 °F (36.4 °C)-98.1 °F (36.7 °C)] 97.6 °F (36.4 °C)  HR:  [79-96] 96  BP: (113-150)/() 128/94  Resp:  [13-18] 18  SpO2:  [95 %-98 %] 95 %  O2 Device: None (Room air)    Current Weight: Weight - Scale: 90.9 kg (200 lb 6.4 oz)  First Weight: Weight - Scale: 90.3 kg (199 lb 1.2 oz)  I/O         02/11 0701  02/12 0700 02/12 0701  02/13 0700 02/13 0701  02/14 0700    P.O. 1500 2230 200    I.V. (mL/kg) 1281.7 (14.1)      Total Intake(mL/kg) 2781.7 (30.6) 2230 (24.5) 200 (2.2)    Urine (mL/kg/hr) 2000 (0.9) 2925 (1.3) 350 (1.2)    Stool 1100 1400 0    Total Output 3100 4325 350    Net -318.3 -2095 -150           Unmeasured Stool Occurrence 1 x 10 x 1 x          Physical Exam  Vitals and nursing note reviewed.   Constitutional:       General: He is not in acute distress.     Appearance: He is well-developed.   HENT:      Head: Normocephalic and atraumatic.   Cardiovascular:      Rate and Rhythm: Normal rate and regular rhythm.      Heart sounds: No murmur heard.  Pulmonary:      Effort: Pulmonary effort is normal. No respiratory distress.      Breath sounds: Normal breath sounds.   Abdominal:      Palpations: Abdomen is soft.      Tenderness: There is no abdominal tenderness.   Musculoskeletal:      Right lower leg: Edema present.      Left lower leg: Edema present.   Skin:     General: Skin is warm and dry.      Capillary Refill: Capillary refill takes less than 2 seconds.   Neurological:      Mental Status: He is alert.   Psychiatric:         Mood and Affect: Mood normal.         Medications:    Current Facility-Administered Medications:     acetaminophen (TYLENOL) tablet 650 mg, 650  mg, Oral, Q6H PRN, Prudencio Nix MD, 650 mg at 02/10/25 1126    aluminum-magnesium hydroxide-simethicone (MAALOX) oral suspension 30 mL, 30 mL, Oral, Q4H PRN, Dominique Farris MD, 30 mL at 02/09/25 2102    clopidogrel (PLAVIX) tablet 75 mg, 75 mg, Oral, Daily, Ankita Katz MD, 75 mg at 02/13/25 0853    folic acid (FOLVITE) tablet 1 mg, 1 mg, Oral, Daily, Ankita Katz MD, 1 mg at 02/13/25 0853    heparin (porcine) subcutaneous injection 5,000 Units, 5,000 Units, Subcutaneous, Q8H DAVION, 5,000 Units at 02/13/25 0504 **AND** [COMPLETED] Platelet count, , , Once, Ankita Katz MD    loperamide (IMODIUM) capsule 2 mg, 2 mg, Oral, BID, Martha Gagnon PA-C, 2 mg at 02/13/25 0853    magnesium sulfate 2 g/50 mL IVPB (premix) 2 g, 2 g, Intravenous, Once, Rick Lopez PA-C    metoprolol succinate (TOPROL-XL) 24 hr tablet 25 mg, 25 mg, Oral, Daily, Ankita Katz MD, 25 mg at 02/13/25 0853    nicotine (NICODERM CQ) 21 mg/24 hr TD 24 hr patch 1 patch, 1 patch, Transdermal, Daily, Ankita Katz MD, 1 patch at 02/13/25 0857    pantoprazole (PROTONIX) EC tablet 40 mg, 40 mg, Oral, Early Morning, Ankita Katz MD, 40 mg at 02/13/25 0504    potassium chloride (Klor-Con M20) CR tablet 20 mEq, 20 mEq, Oral, Q3H, Rick Lopez PA-C    saccharomyces boulardii (FLORASTOR) capsule 250 mg, 250 mg, Oral, BID, Ankita Katz MD, 250 mg at 02/13/25 0853    sodium bicarbonate 150 mEq in dextrose 5 % 1,000 mL infusion, 50 mL/hr, Intravenous, Continuous, Rick Lopez PA-C, Last Rate: 75 mL/hr at 02/13/25 0853, 75 mL/hr at 02/13/25 0853    sodium chloride 0.9 % infusion, 100 mL/hr, Intravenous, Continuous, LAURIE Vitale, Last Rate: 100 mL/hr at 02/13/25 0435, 100 mL/hr at 02/13/25 0435    sodium phosphate 21 mmol in dextrose 5 % 250 mL Infusion, 21 mmol, Intravenous, Once, Rick Lopez PA-C    thiamine (VITAMIN B1) 500 mg in sodium chloride 0.9 % 50 mL IVPB, 500 mg, Intravenous, Q8H [facility], Isaías Eller,  "DO, Last Rate: 100 mL/hr at 02/13/25 0853, 500 mg at 02/13/25 0853    traZODone (DESYREL) tablet 50 mg, 50 mg, Oral, HS PRN, Garcia Escobar MD, 50 mg at 02/13/25 0008      Lab Results: I have reviewed the following results:  Results from last 7 days   Lab Units 02/13/25  0800 02/12/25  0541 02/11/25  0420 02/10/25  0406 02/09/25  0546 02/08/25  0903 02/08/25  0437 02/07/25  0813 02/07/25  0725 02/07/25  0547 02/07/25  0453   WBC Thousand/uL  --  8.89 8.37 9.56 8.39  --  6.95  --   --   --  9.64   HEMOGLOBIN g/dL  --  9.8* 9.6* 10.0* 10.6*  --  10.8*  --   --   --  13.0   HEMATOCRIT %  --  30.0* 29.2* 31.4* 33.2*  --  32.8*  --   --   --  38.1   PLATELETS Thousands/uL  --  134* 128* 119* 118*  --  108*  --  105*  --  141*   POTASSIUM mmol/L 3.6 3.4* 3.2* 3.4* 4.0 3.6 3.2*  3.3*   < >  --   --  3.5   CHLORIDE mmol/L 106 106 106 107 110* 104 103  104   < >  --   --  98   CO2 mmol/L 23 23 20* 16* 15* 18* 20*  19*   < >  --   --  20*   BUN mg/dL 29* 33* 36* 34* 29* 25 24  24   < >  --   --  18   CREATININE mg/dL 4.20* 4.44* 4.64* 4.78* 4.66* 4.30* 4.06*  4.06*   < >  --   --  3.07*   CALCIUM mg/dL 7.4* 7.3* 7.2* 7.2* 7.6* 8.2* 8.1*  8.0*   < >  --   --  9.2   MAGNESIUM mg/dL 1.7* 1.8* 1.5*  --  1.6*  --   --   --   --  1.8*  --    PHOSPHORUS mg/dL 2.5* 1.8*  --   --  3.0  --  2.6*  --   --  2.7  --    ALBUMIN g/dL  --  2.2* 2.0* 2.0* 2.1*  --  2.1*  --   --   --  2.3*    < > = values in this interval not displayed.       Administrative Statements     Portions of the record may have been created with voice recognition software. Occasional wrong word or \"sound a like\" substitutions may have occurred due to the inherent limitations of voice recognition software. Read the chart carefully and recognize, using context, where substitutions have occurred.If you have any questions, please contact the dictating provider.  "

## 2025-02-13 NOTE — ASSESSMENT & PLAN NOTE
Patient's status post potassium chloride 40 mEq twice daily yesterday.  Provide another 60 mEq today to cover for stool losses

## 2025-02-13 NOTE — ASSESSMENT & PLAN NOTE
Likely due to prerenal azotemia and rhabdomyolysis, IV fluid management per nephrology    Suspected component of ATN, appreciate nephrology input    Creatinine trending down from 4.44-4.20

## 2025-02-14 LAB
ALBUMIN SERPL BCG-MCNC: 2.2 G/DL (ref 3.5–5)
ALBUMIN SERPL BCG-MCNC: 2.3 G/DL (ref 3.5–5)
ALP SERPL-CCNC: 114 U/L (ref 34–104)
ALP SERPL-CCNC: 124 U/L (ref 34–104)
ALT SERPL W P-5'-P-CCNC: 201 U/L (ref 7–52)
ALT SERPL W P-5'-P-CCNC: 212 U/L (ref 7–52)
ANION GAP SERPL CALCULATED.3IONS-SCNC: 8 MMOL/L (ref 4–13)
ANION GAP SERPL CALCULATED.3IONS-SCNC: 8 MMOL/L (ref 4–13)
AST SERPL W P-5'-P-CCNC: 208 U/L (ref 13–39)
AST SERPL W P-5'-P-CCNC: 240 U/L (ref 13–39)
BASOPHILS # BLD AUTO: 0.04 THOUSANDS/ΜL (ref 0–0.1)
BASOPHILS NFR BLD AUTO: 1 % (ref 0–1)
BILIRUB SERPL-MCNC: 1.49 MG/DL (ref 0.2–1)
BILIRUB SERPL-MCNC: 1.59 MG/DL (ref 0.2–1)
BUN SERPL-MCNC: 23 MG/DL (ref 5–25)
BUN SERPL-MCNC: 25 MG/DL (ref 5–25)
CALCIUM ALBUM COR SERPL-MCNC: 8.7 MG/DL (ref 8.3–10.1)
CALCIUM ALBUM COR SERPL-MCNC: 8.7 MG/DL (ref 8.3–10.1)
CALCIUM SERPL-MCNC: 7.3 MG/DL (ref 8.4–10.2)
CALCIUM SERPL-MCNC: 7.3 MG/DL (ref 8.4–10.2)
CHLORIDE SERPL-SCNC: 107 MMOL/L (ref 96–108)
CHLORIDE SERPL-SCNC: 108 MMOL/L (ref 96–108)
CK SERPL-CCNC: 3106 U/L (ref 39–308)
CO2 SERPL-SCNC: 23 MMOL/L (ref 21–32)
CO2 SERPL-SCNC: 24 MMOL/L (ref 21–32)
CREAT SERPL-MCNC: 3.93 MG/DL (ref 0.6–1.3)
CREAT SERPL-MCNC: 3.95 MG/DL (ref 0.6–1.3)
EOSINOPHIL # BLD AUTO: 0.14 THOUSAND/ΜL (ref 0–0.61)
EOSINOPHIL NFR BLD AUTO: 2 % (ref 0–6)
ERYTHROCYTE [DISTWIDTH] IN BLOOD BY AUTOMATED COUNT: 15.4 % (ref 11.6–15.1)
GFR SERPL CREATININE-BSD FRML MDRD: 16 ML/MIN/1.73SQ M
GFR SERPL CREATININE-BSD FRML MDRD: 16 ML/MIN/1.73SQ M
GLUCOSE SERPL-MCNC: 108 MG/DL (ref 65–140)
GLUCOSE SERPL-MCNC: 84 MG/DL (ref 65–140)
HCT VFR BLD AUTO: 29.1 % (ref 36.5–49.3)
HGB BLD-MCNC: 9.4 G/DL (ref 12–17)
IMM GRANULOCYTES # BLD AUTO: 0.07 THOUSAND/UL (ref 0–0.2)
IMM GRANULOCYTES NFR BLD AUTO: 1 % (ref 0–2)
LYMPHOCYTES # BLD AUTO: 1.2 THOUSANDS/ΜL (ref 0.6–4.47)
LYMPHOCYTES NFR BLD AUTO: 14 % (ref 14–44)
MAGNESIUM SERPL-MCNC: 1.8 MG/DL (ref 1.9–2.7)
MCH RBC QN AUTO: 33.6 PG (ref 26.8–34.3)
MCHC RBC AUTO-ENTMCNC: 32.3 G/DL (ref 31.4–37.4)
MCV RBC AUTO: 104 FL (ref 82–98)
MONOCYTES # BLD AUTO: 0.76 THOUSAND/ΜL (ref 0.17–1.22)
MONOCYTES NFR BLD AUTO: 9 % (ref 4–12)
NEUTROPHILS # BLD AUTO: 6.27 THOUSANDS/ΜL (ref 1.85–7.62)
NEUTS SEG NFR BLD AUTO: 73 % (ref 43–75)
NRBC BLD AUTO-RTO: 0 /100 WBCS
PHOSPHATE SERPL-MCNC: 3.4 MG/DL (ref 2.7–4.5)
PLATELET # BLD AUTO: 184 THOUSANDS/UL (ref 149–390)
PMV BLD AUTO: 10.8 FL (ref 8.9–12.7)
POTASSIUM SERPL-SCNC: 3.3 MMOL/L (ref 3.5–5.3)
POTASSIUM SERPL-SCNC: 4 MMOL/L (ref 3.5–5.3)
PROT SERPL-MCNC: 4.9 G/DL (ref 6.4–8.4)
PROT SERPL-MCNC: 5.1 G/DL (ref 6.4–8.4)
RBC # BLD AUTO: 2.8 MILLION/UL (ref 3.88–5.62)
SODIUM SERPL-SCNC: 139 MMOL/L (ref 135–147)
SODIUM SERPL-SCNC: 139 MMOL/L (ref 135–147)
WBC # BLD AUTO: 8.48 THOUSAND/UL (ref 4.31–10.16)

## 2025-02-14 PROCEDURE — 99232 SBSQ HOSP IP/OBS MODERATE 35: CPT | Performed by: INTERNAL MEDICINE

## 2025-02-14 PROCEDURE — 82550 ASSAY OF CK (CPK): CPT | Performed by: INTERNAL MEDICINE

## 2025-02-14 PROCEDURE — 97530 THERAPEUTIC ACTIVITIES: CPT

## 2025-02-14 PROCEDURE — 80053 COMPREHEN METABOLIC PANEL: CPT | Performed by: INTERNAL MEDICINE

## 2025-02-14 PROCEDURE — 83735 ASSAY OF MAGNESIUM: CPT | Performed by: INTERNAL MEDICINE

## 2025-02-14 PROCEDURE — 84100 ASSAY OF PHOSPHORUS: CPT | Performed by: INTERNAL MEDICINE

## 2025-02-14 PROCEDURE — 85025 COMPLETE CBC W/AUTO DIFF WBC: CPT | Performed by: INTERNAL MEDICINE

## 2025-02-14 RX ORDER — LOPERAMIDE HYDROCHLORIDE 2 MG/1
4 CAPSULE ORAL 3 TIMES DAILY
Status: DISCONTINUED | OUTPATIENT
Start: 2025-02-14 | End: 2025-02-16

## 2025-02-14 RX ORDER — SIMETHICONE 80 MG
80 TABLET,CHEWABLE ORAL EVERY 6 HOURS PRN
Status: DISCONTINUED | OUTPATIENT
Start: 2025-02-14 | End: 2025-02-19 | Stop reason: HOSPADM

## 2025-02-14 RX ORDER — POTASSIUM CHLORIDE 1500 MG/1
40 TABLET, EXTENDED RELEASE ORAL 2 TIMES DAILY
Status: COMPLETED | OUTPATIENT
Start: 2025-02-14 | End: 2025-02-15

## 2025-02-14 RX ORDER — SODIUM CHLORIDE, SODIUM GLUCONATE, SODIUM ACETATE, POTASSIUM CHLORIDE, MAGNESIUM CHLORIDE, SODIUM PHOSPHATE, DIBASIC, AND POTASSIUM PHOSPHATE .53; .5; .37; .037; .03; .012; .00082 G/100ML; G/100ML; G/100ML; G/100ML; G/100ML; G/100ML; G/100ML
100 INJECTION, SOLUTION INTRAVENOUS CONTINUOUS
Status: DISCONTINUED | OUTPATIENT
Start: 2025-02-14 | End: 2025-02-15

## 2025-02-14 RX ORDER — MAGNESIUM SULFATE HEPTAHYDRATE 40 MG/ML
2 INJECTION, SOLUTION INTRAVENOUS ONCE
Status: COMPLETED | OUTPATIENT
Start: 2025-02-14 | End: 2025-02-14

## 2025-02-14 RX ADMIN — SIMETHICONE 80 MG: 80 TABLET, CHEWABLE ORAL at 16:53

## 2025-02-14 RX ADMIN — NICOTINE 1 PATCH: 21 PATCH, EXTENDED RELEASE TRANSDERMAL at 08:10

## 2025-02-14 RX ADMIN — FOLIC ACID 1 MG: 1 TABLET ORAL at 08:05

## 2025-02-14 RX ADMIN — SODIUM BICARBONATE 50 ML/HR: 84 INJECTION, SOLUTION INTRAVENOUS at 08:05

## 2025-02-14 RX ADMIN — SODIUM CHLORIDE, SODIUM GLUCONATE, SODIUM ACETATE, POTASSIUM CHLORIDE, MAGNESIUM CHLORIDE, SODIUM PHOSPHATE, DIBASIC, AND POTASSIUM PHOSPHATE 100 ML/HR: .53; .5; .37; .037; .03; .012; .00082 INJECTION, SOLUTION INTRAVENOUS at 10:36

## 2025-02-14 RX ADMIN — HEPARIN SODIUM 5000 UNITS: 5000 INJECTION, SOLUTION INTRAVENOUS; SUBCUTANEOUS at 14:29

## 2025-02-14 RX ADMIN — THIAMINE HYDROCHLORIDE 500 MG: 100 INJECTION, SOLUTION INTRAMUSCULAR; INTRAVENOUS at 21:22

## 2025-02-14 RX ADMIN — POTASSIUM CHLORIDE 40 MEQ: 1500 TABLET, EXTENDED RELEASE ORAL at 16:33

## 2025-02-14 RX ADMIN — TRAZODONE HYDROCHLORIDE 50 MG: 50 TABLET ORAL at 00:18

## 2025-02-14 RX ADMIN — MICONAZOLE NITRATE: 20 CREAM TOPICAL at 16:35

## 2025-02-14 RX ADMIN — CLOPIDOGREL 75 MG: 75 TABLET ORAL at 08:05

## 2025-02-14 RX ADMIN — PANTOPRAZOLE SODIUM 40 MG: 40 TABLET, DELAYED RELEASE ORAL at 05:11

## 2025-02-14 RX ADMIN — LOPERAMIDE HYDROCHLORIDE 4 MG: 2 CAPSULE ORAL at 16:33

## 2025-02-14 RX ADMIN — ALUMINUM HYDROXIDE, MAGNESIUM HYDROXIDE, AND DIMETHICONE 30 ML: 200; 20; 200 SUSPENSION ORAL at 14:28

## 2025-02-14 RX ADMIN — POTASSIUM CHLORIDE 40 MEQ: 1500 TABLET, EXTENDED RELEASE ORAL at 10:36

## 2025-02-14 RX ADMIN — HEPARIN SODIUM 5000 UNITS: 5000 INJECTION, SOLUTION INTRAVENOUS; SUBCUTANEOUS at 05:11

## 2025-02-14 RX ADMIN — METOPROLOL SUCCINATE 25 MG: 25 TABLET, EXTENDED RELEASE ORAL at 08:05

## 2025-02-14 RX ADMIN — Medication 250 MG: at 08:05

## 2025-02-14 RX ADMIN — THIAMINE HYDROCHLORIDE 500 MG: 100 INJECTION, SOLUTION INTRAMUSCULAR; INTRAVENOUS at 14:29

## 2025-02-14 RX ADMIN — HEPARIN SODIUM 5000 UNITS: 5000 INJECTION, SOLUTION INTRAVENOUS; SUBCUTANEOUS at 21:22

## 2025-02-14 RX ADMIN — MAGNESIUM SULFATE HEPTAHYDRATE 2 G: 40 INJECTION, SOLUTION INTRAVENOUS at 10:36

## 2025-02-14 RX ADMIN — MICONAZOLE NITRATE: 20 CREAM TOPICAL at 08:10

## 2025-02-14 RX ADMIN — THIAMINE HYDROCHLORIDE 500 MG: 100 INJECTION, SOLUTION INTRAMUSCULAR; INTRAVENOUS at 05:11

## 2025-02-14 RX ADMIN — Medication 250 MG: at 16:33

## 2025-02-14 RX ADMIN — SODIUM CHLORIDE, SODIUM GLUCONATE, SODIUM ACETATE, POTASSIUM CHLORIDE, MAGNESIUM CHLORIDE, SODIUM PHOSPHATE, DIBASIC, AND POTASSIUM PHOSPHATE 100 ML/HR: .53; .5; .37; .037; .03; .012; .00082 INJECTION, SOLUTION INTRAVENOUS at 22:53

## 2025-02-14 RX ADMIN — LOPERAMIDE HYDROCHLORIDE 4 MG: 2 CAPSULE ORAL at 21:22

## 2025-02-14 RX ADMIN — LOPERAMIDE HYDROCHLORIDE 2 MG: 2 CAPSULE ORAL at 08:05

## 2025-02-14 NOTE — ASSESSMENT & PLAN NOTE
Potassium is 3.3 on February 14 today with a magnesium of 1.8.  Will replace potassium and adjust maintenance dosing of it has not been done already.  Will also replace magnesium.  Regarding other electrolytes phosphorus has improved to 3.4.

## 2025-02-14 NOTE — PLAN OF CARE
Problem: PHYSICAL THERAPY ADULT  Goal: Performs mobility at highest level of function for planned discharge setting.  See evaluation for individualized goals.  Description: Treatment/Interventions: Functional transfer training, LE strengthening/ROM, Therapeutic exercise, Endurance training, Elevations, Bed mobility, Gait training          See flowsheet documentation for full assessment, interventions and recommendations.  Outcome: Progressing  Note: Prognosis: Good  Problem List: Decreased strength, Decreased endurance, Impaired balance, Decreased mobility, Decreased safety awareness  Assessment: Pt. seen for PT treatment session this date with interventions consisting of  t, bed mobility, transfers and  gait training w/ emphasis on improving pt's functional activity tolerance. Pt. Requiring  cues for sequence and safety. In comparison to previous session, Pt. With decrease in activity tolerance due to diarrhea, pain and weakness.   Pt is in need of continued activity in PT to improve strength balance endurance mobility transfers and ambulation with return to maximize LOF. From PT/mobility standpoint, recommendation at time of d/c would be Level I: maximum resource intensity in order to promote return to PLOF and independence.   The patient's AM-PAC Basic Mobility Inpatient Short Form Raw Score is 14. A Raw score of less than or equal to 16 suggests the patient may benefit from discharge to post-acute rehabilitation services. Pt continues to be functioning below baseline level. PT will continue to see pt during current hospitalization in order to address the deficits listed above and provide interventions consistent w/ POC in effort to achieve goals.  Please also refer to physical therapist recommendation for safe DC planning.        Rehab Resource Intensity Level, PT: I (Maximum Resource Intensity)    See flowsheet documentation for full assessment.

## 2025-02-14 NOTE — PLAN OF CARE
Problem: RESPIRATORY - ADULT  Goal: Achieves optimal ventilation and oxygenation  Description: INTERVENTIONS:  - Assess for changes in respiratory status  - Assess for changes in mentation and behavior  - Position to facilitate oxygenation and minimize respiratory effort  - Oxygen administered by appropriate delivery if ordered  - Initiate smoking cessation education as indicated  - Encourage broncho-pulmonary hygiene including cough, deep breathe, Incentive Spirometry  - Assess the need for suctioning and aspirate as needed  - Assess and instruct to report SOB or any respiratory difficulty  - Respiratory Therapy support as indicated  Outcome: Progressing     Problem: SKIN/TISSUE INTEGRITY - ADULT  Goal: Skin Integrity remains intact(Skin Breakdown Prevention)  Description: Assess:  -Perform Tani assessment every 2  -Clean and moisturize skin every incont. Episode   -Inspect skin when repositioning, toileting, and assisting with ADLS  -Assess extremities for adequate circulation and sensation     Bed Management:  -Have minimal linens on bed & keep smooth, unwrinkled  -Change linens as needed when moist or perspiring  -Avoid sitting or lying in one position for more than 2 hours while in bed  -Keep HOB at 30 degrees     Toileting:  -Offer bedside commode  -Assess for incontinence every shift  -Use incontinent care products after each incontinent episode such as alejandra wipes    Activity:  -Mobilize patient 3 times a day  -Encourage activity and walks on unit  -Encourage or provide ROM exercises   -Turn and reposition patient every 2 Hours  -Use appropriate equipment to lift or move patient in bed  -Instruct/ Assist with weight shifting every 60 mins when out of bed in chair  -Consider limitation of chair time 6 hour intervals    Skin Care:  -Avoid use of baby powder, tape, friction and shearing, hot water or constrictive clothing  -Relieve pressure over bony prominences using foam wedges/pillows  -Do not massage red  bony areas    Outcome: Progressing     Problem: MUSCULOSKELETAL - ADULT  Goal: Maintain or return mobility to safest level of function  Description: INTERVENTIONS:  - Assess patient's ability to carry out ADLs; assess patient's baseline for ADL function and identify physical deficits which impact ability to perform ADLs (bathing, care of mouth/teeth, toileting, grooming, dressing, etc.)  - Assess/evaluate cause of self-care deficits   - Assess range of motion  - Assess patient's mobility  - Assess patient's need for assistive devices and provide as appropriate  - Encourage maximum independence but intervene and supervise when necessary  - Involve family in performance of ADLs  - Assess for home care needs following discharge   - Consider OT consult to assist with ADL evaluation and planning for discharge  - Provide patient education as appropriate  Outcome: Progressing     Problem: PAIN - ADULT  Goal: Verbalizes/displays adequate comfort level or baseline comfort level  Description: Interventions:  - Encourage patient to monitor pain and request assistance  - Assess pain using appropriate pain scale  - Administer analgesics based on type and severity of pain and evaluate response  - Implement non-pharmacological measures as appropriate and evaluate response  - Consider cultural and social influences on pain and pain management  - Notify physician/advanced practitioner if interventions unsuccessful or patient reports new pain  Outcome: Progressing     Problem: INFECTION - ADULT  Goal: Absence or prevention of progression during hospitalization  Description: INTERVENTIONS:  - Assess and monitor for signs and symptoms of infection  - Monitor lab/diagnostic results  - Monitor all insertion sites, i.e. indwelling lines, tubes, and drains  - Monitor endotracheal if appropriate and nasal secretions for changes in amount and color  - [place] appropriate cooling/warming therapies per order  - Administer medications as  ordered  - Instruct and encourage patient and family to use good hand hygiene technique  - Identify and instruct in appropriate isolation precautions for identified infection/condition  Outcome: Progressing     Problem: SAFETY ADULT  Goal: Patient will remain free of falls  Description: INTERVENTIONS:  - Educate patient/family on patient safety including physical limitations  - Instruct patient to call for assistance with activity   - Consult OT/PT to assist with strengthening/mobility   - Keep Call bell within reach  - Keep bed low and locked with side rails adjusted as appropriate  - Keep care items and personal belongings within reach  - Initiate and maintain comfort rounds  - Make Fall Risk Sign visible to staff  - Offer Toileting every 2 Hours, in advance of need  - Initiate/Maintain bed/chair alarm  - Obtain necessary fall risk management equipment: non skid socks  - Apply yellow socks and bracelet for high fall risk patients  - Consider moving patient to room near nurses station  Outcome: Progressing  Goal: Maintain or return to baseline ADL function  Description: INTERVENTIONS:  -  Assess patient's ability to carry out ADLs; assess patient's baseline for ADL function and identify physical deficits which impact ability to perform ADLs (bathing, care of mouth/teeth, toileting, grooming, dressing, etc.)  - Assess/evaluate cause of self-care deficits   - Assess range of motion  - Assess patient's mobility; develop plan if impaired  - Assess patient's need for assistive devices and provide as appropriate  - Encourage maximum independence but intervene and supervise when necessary  - Involve family in performance of ADLs  - Assess for home care needs following discharge   - Consider OT consult to assist with ADL evaluation and planning for discharge  - Provide patient education as appropriate  Outcome: Progressing  Goal: Maintains/Returns to pre admission functional level  Description: INTERVENTIONS:  - Perform  AM-PAC 6 Click Basic Mobility/ Daily Activity assessment daily.  - Set and communicate daily mobility goal to care team and patient/family/caregiver.   - Collaborate with rehabilitation services on mobility goals if consulted  - Perform Range of Motion 3 times a day.  - Reposition patient every 2 hours.  - Dangle patient 3 times a day  - Stand patient 3 times a day  - Ambulate patient 3 times a day  - Out of bed to chair 3 times a day   - Out of bed for meals 3 times a day  - Out of bed for toileting  - Record patient progress and toleration of activity level   Outcome: Progressing     Problem: DISCHARGE PLANNING  Goal: Discharge to home or other facility with appropriate resources  Description: INTERVENTIONS:  - Identify barriers to discharge w/patient and caregiver  - Arrange for needed discharge resources and transportation as appropriate  - Identify discharge learning needs (meds, wound care, etc.)  - Arrange for interpretive services to assist at discharge as needed  - Refer to Case Management Department for coordinating discharge planning if the patient needs post-hospital services based on physician/advanced practitioner order or complex needs related to functional status, cognitive ability, or social support system  Outcome: Progressing     Problem: Knowledge Deficit  Goal: Patient/family/caregiver demonstrates understanding of disease process, treatment plan, medications, and discharge instructions  Description: Complete learning assessment and assess knowledge base.  Interventions:  - Provide teaching at level of understanding  - Provide teaching via preferred learning methods  Outcome: Progressing     Problem: Prexisting or High Potential for Compromised Skin Integrity  Goal: Skin integrity is maintained or improved  Description: INTERVENTIONS:  - Identify patients at risk for skin breakdown  - Assess and monitor skin integrity  - Assess and monitor nutrition and hydration status  - Monitor labs   -  Assess for incontinence   - Turn and reposition patient  - Assist with mobility/ambulation  - Relieve pressure over bony prominences  - Avoid friction and shearing  - Provide appropriate hygiene as needed including keeping skin clean and dry  - Evaluate need for skin moisturizer/barrier cream  - Collaborate with interdisciplinary team   - Patient/family teaching  - Consider wound care consult   Outcome: Progressing     Problem: GASTROINTESTINAL - ADULT  Goal: Maintains or returns to baseline bowel function  Description: INTERVENTIONS:  - Assess bowel function  - Encourage oral fluids to ensure adequate hydration  - Administer IV fluids if ordered to ensure adequate hydration  - Administer ordered medications as needed  - Encourage mobilization and activity  - Consider nutritional services referral to assist patient with adequate nutrition and appropriate food choices  Outcome: Progressing

## 2025-02-14 NOTE — ASSESSMENT & PLAN NOTE
Rectal tube removed, continue to monitor stool output, seems improved.    Medical management per GI

## 2025-02-14 NOTE — ASSESSMENT & PLAN NOTE
Potentially related to alcohol intake, although increased AST can also be secondary to rhabdomyolysis.  ALT levels have improved from admission as have AST levels.  Patient's bilirubin levels have also improved.  Appreciate GI input.

## 2025-02-14 NOTE — PHYSICAL THERAPY NOTE
"                                                                                  PHYSICAL THERAPY NOTE          Patient Name: Emiliano Rodriguez  Today's Date: 2/14/2025 02/14/25 1118   PT Last Visit   PT Visit Date 02/14/25   Note Type   Note Type Treatment   Pain Assessment   Pain Assessment Tool FLACC   Pain Location/Orientation Orientation: Bilateral;Location: Leg   Pain Rating: FLACC (Rest) - Face 0   Pain Rating: FLACC (Rest) - Legs 0   Pain Rating: FLACC (Rest) - Activity 0   Pain Rating: FLACC (Rest) - Cry 0   Pain Rating: FLACC (Rest) - Consolability 0   Score: FLACC (Rest) 0   Pain Rating: FLACC (Activity) - Face 1   Pain Rating: FLACC (Activity) - Legs 1   Pain Rating: FLACC (Activity) - Activity 1   Pain Rating: FLACC (Activity) - Cry 0   Pain Rating: FLACC (Activity) - Consolability 0   Score: FLACC (Activity) 3   Restrictions/Precautions   Weight Bearing Precautions Per Order No   Other Precautions Fall Risk;Pain;Multiple lines;Bed Alarm;Chair Alarm   General   Chart Reviewed Yes   Response to Previous Treatment Patient reporting fatigue but able to participate.   Family/Caregiver Present No   Cognition   Overall Cognitive Status WFL   Arousal/Participation Alert   Attention Within functional limits   Orientation Level Oriented X4   Following Commands Follows all commands and directions without difficulty   Subjective   Subjective c/o BLE weakness and pain. Reports frequent diarrhea since last night.  \" I'm raw, I'm not putting anything on\"   Bed Mobility   Sit to Supine 3  Moderate assistance   Additional items Assist x 1;HOB elevated;Bedrails;Increased time required;Verbal cues   Additional Comments Increased time to transition to EOB. Sat EOB with fair+ sitting balance.   Transfers   Sit to Stand 4  Minimal assistance   Additional items Assist x 1;Increased time required;Verbal cues   Stand to Sit 4  Minimal assistance   Additional items Assist x 1;Armrests;Impulsive;Verbal cues   Stand pivot 4  " Minimal assistance   Additional items Verbal cues   Additional Comments RW used   Ambulation/Elevation   Gait pattern Excessively slow;Short stride;Foward flexed;Decreased foot clearance;Ataxia;Improper Weight shift   Gait Assistance 4  Minimal assist   Additional items Assist x 1;Verbal cues   Assistive Device Rolling walker   Distance 3'  (further ambualtion deferred due to LE weakness and diarrhea)   Balance   Static Sitting Fair +   Dynamic Sitting Fair +   Static Standing Fair -   Dynamic Standing Fair -   Ambulatory Fair -  (RW)   Endurance Deficit   Endurance Deficit Yes   Activity Tolerance   Activity Tolerance Patient limited by fatigue;Patient limited by pain   Assessment   Prognosis Good   Problem List Decreased strength;Decreased endurance;Impaired balance;Decreased mobility;Decreased safety awareness   Assessment Pt. seen for PT treatment session this date with interventions consisting of  t, bed mobility, transfers and  gait training w/ emphasis on improving pt's functional activity tolerance. Pt. Requiring  cues for sequence and safety. In comparison to previous session, Pt. With decrease in activity tolerance due to diarrhea, pain and weakness.   Pt is in need of continued activity in PT to improve strength balance endurance mobility transfers and ambulation with return to maximize LOF. From PT/mobility standpoint, recommendation at time of d/c would be Level I: maximum resource intensity in order to promote return to PLOF and independence.   The patient's AM-[facility] Basic Mobility Inpatient Short Form Raw Score is 14. A Raw score of less than or equal to 16 suggests the patient may benefit from discharge to post-acute rehabilitation services. Pt continues to be functioning below baseline level. PT will continue to see pt during current hospitalization in order to address the deficits listed above and provide interventions consistent w/ POC in effort to achieve goals.  Please also refer to physical  therapist recommendation for safe DC planning.   Goals   Patient Goals tog et better   LTG Expiration Date 02/24/25   PT Treatment Day 4   Plan   Treatment/Interventions Functional transfer training;LE strengthening/ROM;Therapeutic exercise;Endurance training;Elevations;Bed mobility;Gait training   Progress Progressing toward goals   PT Frequency 3-5x/wk   Discharge Recommendation   Rehab Resource Intensity Level, PT I (Maximum Resource Intensity)   AM-PAC Basic Mobility Inpatient   Turning in Flat Bed Without Bedrails 3   Lying on Back to Sitting on Edge of Flat Bed Without Bedrails 2   Moving Bed to Chair 3   Standing Up From Chair Using Arms 3   Walk in Room 2   Climb 3-5 Stairs With Railing 1   Basic Mobility Inpatient Raw Score 14   Basic Mobility Standardized Score 35.55   [facility] Highest Level Of Mobility   -HL Goal 4: Move to chair/commode   -[facility] Achieved 4: Move to chair/commode   Education   Education Provided Mobility training   Patient Demonstrates verbal understanding;Reinforcement needed   End of Consult   Patient Position at End of Consult Bedside chair;Bed/Chair alarm activated;All needs within reach   End of Consult Comments discussed POC with PT

## 2025-02-14 NOTE — ASSESSMENT & PLAN NOTE
Elevation in LFTs is likely multifactorial, suspect in relation to EtOH abuse, hepatic steatosis, as well as secondary to rhabdomyolysis.  US with steatosis and patent hepatic vessels.   Serologic investigation was negative for autoimmune and genetic causes of elevated transaminases, ceruloplasmin was low, which can be seen in multitude of disorders, though at some point patient should have a 24-hour urine copper checked, we ultimately agreed to defer for now given his significant GUNJAN.    As previously discussed, there may be a component of alcoholic hepatitis, though discriminant function < 32 therefore steroids not indicated.    Continue to trend LFTs at this time, continuing to improve as of 02/14/25.  Continue to avoid hepatotoxins.   Continue with complete etoh cessation.     Continue current diet as ordered.

## 2025-02-14 NOTE — CASE MANAGEMENT
Case Management Discharge Planning Note    Patient name Emiliano Rodriguez  Location /404-01 MRN [id number]  : 1975 Date 2025       Current Admission Date: 2025  Current Admission Diagnosis:Rhabdomyolysis   Patient Active Problem List    Diagnosis Date Noted Date Diagnosed    Hypomagnesemia 2025     Hypophosphatemia 2025     Weakness of left lower extremity 2025     Hypokalemia 2025     Volume depletion 2025     Acute tubular necrosis (HCC) 2025     Acute renal failure (ARF) (HCC) 2025     Hyponatremia 2025     Rhabdomyolysis 2025     Abnormal LFTs 2025     Acute diverticulitis 2025     Diarrhea 2025     Steatosis of liver 2025     Hepatomegaly 2025     CVA (cerebral vascular accident) (HCC) 2018     Gastroesophageal reflux disease without esophagitis 2018     TIA (transient ischemic attack) 2018     Alcohol abuse 2018     Tobacco abuse 2018       LOS (days): 7  Geometric Mean LOS (GMLOS) (days): 4.7  Days to GMLOS:-2.7     OBJECTIVE:  Risk of Unplanned Readmission Score: 20.45         Current admission status: Inpatient   Preferred Pharmacy:   [id number] [address] [address]  Phone: [phone] Fax: [phone]    Primary Care Provider: Emmett Jimenez DO    Primary Insurance: [employer]  Secondary Insurance:     DISCHARGE DETAILS:          CM discussed therapy recommendations with patient again and he is still refusing STR.   Patient stated he just bought everything he needs to be at home.  Patient is in agreement with [facility].

## 2025-02-14 NOTE — ASSESSMENT & PLAN NOTE
Etiology is attributed to acute tubular necrosis from volume depletion and intrinsic causes from rhabdomyolysis.there has been improvement in the patient's kidney function.  At its peak the creatinine was as high as 4.7 during this admission but as of today on February 14 creatinine has improved to 3.9.  It was 4.21 February 13.  The patient is also nonoliguric and may be developing diuretic ATN.  Need to follow hemodynamics and fluid status closely as patient is still having significant diarrhea for which the patient required a rectal tube.    The patient had been on 2 different IV fluids up to this point as the patient had a significant volume deficit as well as a bicarbonate deficit that needed to both be addressed given ongoing diarrhea as well as GUNJAN in the setting of rhabdomyolysis for which the patient required aggressive volume resuscitation as well.  The patient's bicarbonate as of today has improved to 24.  Will change IV fluids to Isolyte and monitor it at 100 cc an hour and we will decrease IVF with bicarbonate and slowly decrease but need to follow bicarbonate levels especially as the patient is still having significant diarrheal output. Continue to follow hemodynamics and we will recheck labs later today but the balance solution with electrolytes contained within it should also help to address some electrolyte abnormalities as well.  If patient develops worsening bicarbonate level can readd IV bicarbonate infusion.

## 2025-02-14 NOTE — ASSESSMENT & PLAN NOTE
Stool studies negative for infection. Fecal elastase wnl.   Fecal calprotectin elevated at 380.   Did have diverticulitis in 12/2024.   Was originally on rocephin/flagyl, this has been d/c.    Continue Banatrol supplement. Continue probiotics.   Titrate imodium for symptom relief.   Rectal tube has been d/c.

## 2025-02-14 NOTE — OCCUPATIONAL THERAPY NOTE
"  Occupational Therapy Progress Note     Patient Name: Emiliano Rodriguez  Today's Date: 2/14/2025  Problem List  Principal Problem:    Rhabdomyolysis  Active Problems:    Gastroesophageal reflux disease without esophagitis    Alcohol abuse    Acute renal failure (ARF) (HCC)    Hyponatremia    Abnormal LFTs    Acute diverticulitis    Diarrhea    Hypokalemia    Volume depletion    Acute tubular necrosis (HCC)    Weakness of left lower extremity    Hypomagnesemia    Hypophosphatemia            02/14/25 1120   OT Last Visit   OT Visit Date 02/14/25   Note Type   Note Type Treatment   Pain Assessment   Pain Assessment Tool FLACC   Pain Location/Orientation   (\"thighs\")   Pain Rating: FLACC (Rest) - Face 0   Pain Rating: FLACC (Rest) - Legs 0   Pain Rating: FLACC (Rest) - Activity 0   Pain Rating: FLACC (Rest) - Cry 0   Pain Rating: FLACC (Rest) - Consolability 0   Score: FLACC (Rest) 0   Pain Rating: FLACC (Activity) - Face 1   Pain Rating: FLACC (Activity) - Legs 0   Pain Rating: FLACC (Activity) - Activity 0   Pain Rating: FLACC (Activity) - Cry 1   Pain Rating: FLACC (Activity) - Consolability 0   Score: FLACC (Activity) 2   Restrictions/Precautions   Weight Bearing Precautions Per Order No   Other Precautions Chair Alarm;Bed Alarm;Multiple lines;Fall Risk;Impulsive   ADL   Where Assessed Edge of bed   LB Dressing Assistance 3  Moderate Assistance   Toileting Comments Pt limited by pain and deficits in balance, strength, and endurance.   Bed Mobility   Supine to Sit 3  Moderate assistance   Additional items Assist x 1;HOB elevated;Bedrails;Increased time required;Verbal cues;LE management  (HOB fully elevated, pt unable to transition to EOB)   Additional Comments Pt OOB in chair at end of session, all needs within reach.   Transfers   Sit to Stand 4  Minimal assistance   Additional items Assist x 1;Armrests;Increased time required;Verbal cues   Stand to Sit 4  Minimal assistance   Additional items Assist x " "1;Armrests;Verbal cues;Impulsive   Stand pivot 4  Minimal assistance   Additional items Assist x 1;Armrests;Increased time required;Verbal cues;Impulsive   Additional Comments RW used   Functional Mobility   Additional Comments pt declines - state he is \"too weak because of having diarrhea.\"   Subjective   Subjective \"I am just too weak today.\"   Cognition   Overall Cognitive Status WFL   Arousal/Participation Alert;Cooperative   Attention Within functional limits   Orientation Level Oriented X4   Memory Within functional limits   Following Commands Follows all commands and directions without difficulty   Activity Tolerance   Activity Tolerance Patient limited by fatigue;Patient limited by pain   Assessment   Assessment Patient participated in Skilled OT session this date with interventions consisting of ADL re training with the use of correct body mechnaics, Energy Conservation techniques, Work simplification skills , safety awareness and fall prevention techniques,  therapeutic activities to: increase activity tolerance, increase cardiovascular endurance , and increase dynamic sit/ stand balance during functional activity  . Co treatment with PTA secondary to complex medical condition of pt, A of 2 required to achieve and maintain transitional movements, requiring the need of skilled therapeutic intervention of 2 therapists to achieve delivery of services. Patient agreeable to OT treatment session, upon arrival patient was found supine in bed. Patient requiring verbal cues for safety and frequent rest periods. Patient continues to be functioning below baseline level, occupational performance remains limited secondary to factors listed above and increased risk for falls and injury. The patient's raw score on the AM-PAC Daily Activity Inpatient Short Form is 16. A raw score of less than 19 suggests the patient may benefit from discharge to post-acute rehabilitation services. Please refer to the recommendation of the " Occupational Therapist for safe discharge planning. From OT standpoint, recommendation at time of d/c would be level 1, max resource intensity.   Plan   Treatment Interventions ADL retraining;Functional transfer training;Endurance training;Compensatory technique education;Energy conservation;Activityengagement   Goal Expiration Date 02/24/25   OT Treatment Day 2   OT Frequency 3-5x/wk   Discharge Recommendation   Rehab Resource Intensity Level, OT I (Maximum Resource Intensity)   AM-PAC Daily Activity Inpatient   Lower Body Dressing 2   Bathing 2   Toileting 2   Upper Body Dressing 3   Grooming 3   Eating 4   Daily Activity Raw Score 16   Daily Activity Standardized Score (Calc for Raw Score >=11) 35.96   AM-PAC Applied Cognition Inpatient   Following a Speech/Presentation 4   Understanding Ordinary Conversation 4   Taking Medications 4   Remembering Where Things Are Placed or Put Away 4   Remembering List of 4-5 Errands 4   Taking Care of Complicated Tasks 4   Applied Cognition Raw Score 24   Applied Cognition Standardized Score 62.21     Pt will benefit from continued OT services in order to maximize (I) c ADL performance, FM c least restrictive AD, and improve overall endurance/strength required to complete functional tasks in preparation for d/c.    Pt benefited from co-treatment of skilled OT and PTA in order to most appropriately address functional deficits d/t extensive assistance required for safe functional mobility, decreased activity tolerance, and regression from functioning level prior to admission and/or onset of present illness. OT/PT objectives were addressed separately; please see PT note for specific goal areas targeted.    Pt left seated in chair at end of session; all needs within reach; all lines intact.    Desiree Atwood, OTVICTOR MANUEL/L

## 2025-02-14 NOTE — ASSESSMENT & PLAN NOTE
Patient with hyperbilirbuinemia and elevated LFT in the setting of rhabdomyolysis and ETOH use    Likely multifactorial including alcohol use, fatty liver disease, rhabdomyolysis.    Continues to trend down

## 2025-02-14 NOTE — PLAN OF CARE
Problem: OCCUPATIONAL THERAPY ADULT  Goal: Performs self-care activities at highest level of function for planned discharge setting.  See evaluation for individualized goals.  Description: Treatment Interventions: ADL retraining, Functional transfer training, UE strengthening/ROM, Endurance training, Patient/family training, Equipment evaluation/education, Activityengagement          See flowsheet documentation for full assessment, interventions and recommendations.   Outcome: Progressing  Note: Limitation: Decreased ADL status, Decreased UE strength, Decreased Safe judgement during ADL, Decreased endurance, Decreased self-care trans, Decreased high-level ADLs     Assessment: Patient participated in Skilled OT session this date with interventions consisting of ADL re training with the use of correct body mechnaics, Energy Conservation techniques, Work simplification skills , safety awareness and fall prevention techniques,  therapeutic activities to: increase activity tolerance, increase cardiovascular endurance , and increase dynamic sit/ stand balance during functional activity  . Co treatment with PTA secondary to complex medical condition of pt, A of 2 required to achieve and maintain transitional movements, requiring the need of skilled therapeutic intervention of 2 therapists to achieve delivery of services. Patient agreeable to OT treatment session, upon arrival patient was found supine in bed. Patient requiring verbal cues for safety and frequent rest periods. Patient continues to be functioning below baseline level, occupational performance remains limited secondary to factors listed above and increased risk for falls and injury. The patient's raw score on the AM-PAC Daily Activity Inpatient Short Form is 16. A raw score of less than 19 suggests the patient may benefit from discharge to post-acute rehabilitation services. Please refer to the recommendation of the Occupational Therapist for safe discharge  planning. From OT standpoint, recommendation at time of d/c would be level 1, max resource intensity.     Rehab Resource Intensity Level, OT: I (Maximum Resource Intensity)

## 2025-02-14 NOTE — PROGRESS NOTES
Progress Note - Gastroenterology   Name: Emiliano Rodriguez 49 y.o. male I MRN: [id number]  Unit/Bed#: 404-01 I Date of Admission: 2/7/2025   Date of Service: 2/14/2025 I Hospital Day: 7    Assessment & Plan  Abnormal LFTs  Elevation in LFTs is likely multifactorial, suspect in relation to EtOH abuse, hepatic steatosis, as well as secondary to rhabdomyolysis.  US with steatosis and patent hepatic vessels.   Serologic investigation was negative for autoimmune and genetic causes of elevated transaminases, ceruloplasmin was low, which can be seen in multitude of disorders, though at some point patient should have a 24-hour urine copper checked, we ultimately agreed to defer for now given his significant GUNJAN.    As previously discussed, there may be a component of alcoholic hepatitis, though discriminant function < 32 therefore steroids not indicated.    Continue to trend LFTs at this time, continuing to improve as of 02/14/25.  Continue to avoid hepatotoxins.   Continue with complete etoh cessation.     Continue current diet as ordered.   Alcohol abuse  Strongly encourage complete cessation.   Rhabdomyolysis  Per primary team.   CK is improving.   Diarrhea  Stool studies negative for infection. Fecal elastase wnl.   Fecal calprotectin elevated at 380.   Did have diverticulitis in 12/2024.   Was originally on rocephin/flagyl, this has been d/c.    Continue Banatrol supplement. Continue probiotics.   Titrate imodium for symptom relief.   Rectal tube has been d/c.   Acute diverticulitis  Pt with bout of acute diverticulitis in 12/2024.   CT at time of current admission with read of diverticulitis, though pt without abd pain.   Was treated for 5 days with ceftriaxone and Flagyl, this has since been discontinued.  Outpatient colonoscopy in 6 to 8 weeks.  Gastroesophageal reflux disease without esophagitis  Continue PPI as ordered.   Continue PRN antiemetics as ordered.   Acute renal failure (ARF) (HCC)  Per primary team and  Nephrology.   Likely due to prerenal azotemia and rhabdomyolysis.   Hyponatremia  As per primary team.    Gastroenterology service will follow.    Subjective     50 y/o M w/ pmhx sig for GERD, TIA on Plavix, alcohol use disorder, tobacco use, episode of diverticulitis in 12/2024.     Interval events:     Pt feels the imodium has helped to bulk up diarrhea, though still having numerous episodes with fecal incontinence. No BRBPR or melena. No abd pain. Tolerating PO.    Objective :  Temp:  [98.1 °F (36.7 °C)-99.3 °F (37.4 °C)] 99.3 °F (37.4 °C)  HR:  [89-95] 91  BP: (118-143)/(88-94) 133/92  Resp:  [18-22] 18  SpO2:  [96 %-97 %] 96 %  O2 Device: None (Room air)    Physical Exam  Vitals and nursing note reviewed.   Constitutional:       General: He is not in acute distress.     Appearance: He is well-developed.   HENT:      Head: Normocephalic and atraumatic.   Eyes:      General: No scleral icterus.     Conjunctiva/sclera: Conjunctivae normal.   Pulmonary:      Effort: Pulmonary effort is normal. No respiratory distress.   Abdominal:      General: Abdomen is protuberant. Bowel sounds are normal. There is no distension.      Palpations: Abdomen is soft.      Tenderness: There is no abdominal tenderness. There is no guarding or rebound.   Skin:     General: Skin is warm and dry.      Coloration: Skin is not jaundiced.   Neurological:      General: No focal deficit present.      Mental Status: He is alert.   Psychiatric:         Mood and Affect: Mood normal.         Behavior: Behavior normal.         Lab Results: I have reviewed the following results:CBC/BMP:   .     02/14/25  0509   WBC 8.48   HGB 9.4*   HCT 29.1*      SODIUM 139   K 3.3*      CO2 24   BUN 25   CREATININE 3.95*   GLUC 84   MG 1.8*   PHOS 3.4    , Creatinine Clearance: Estimated Creatinine Clearance: 25.1 mL/min (A) (by C-G formula based on SCr of 3.95 mg/dL (H))., LFTs:   .     02/14/25  0509   *   *   ALB 2.2*   TBILI 1.59*    ALKPHOS 114*    , PTT/INR:No new results in last 24 hours.     Imaging Results Review: I reviewed radiology reports from this admission including: CT abdomen/pelvis.  Other Study Results Review: Pathology reports reviewed.    **Please note:  Dictation voice to text software may have been used in the creation of this record.  Occasional wrong word or “sound alike” substitutions may have occurred due to the inherent limitations of voice recognition software.  Read the chart carefully and recognize, using context, where substitutions have occurred.**

## 2025-02-14 NOTE — PROGRESS NOTES
Progress Note - Hospitalist   Name: Emiliano Rodriguez 49 y.o. male I MRN: [id number]  Unit/Bed#: 404-01 I Date of Admission: 2/7/2025   Date of Service: 2/14/2025 I Hospital Day: 7    Assessment & Plan  Rhabdomyolysis  CK level improving    Continue IV fluid  Acute renal failure (ARF) (HCC)  Likely due to prerenal azotemia and rhabdomyolysis, IV fluid management per nephrology    Suspected component of ATN, appreciate nephrology input    Creatinine trending down to 3.95  Hyponatremia  Sodium level is 139 today/within normal limits  Acute diverticulitis  Patient completed 5 days of antibiotics, will discontinue antibiotics and monitor symptoms.  Plan for outpatient colonoscopy in 6 to 8 weeks    Diarrhea  Rectal tube removed, continue to monitor stool output, seems improved.    Medical management per GI  Weakness of left lower extremity  MRI negative for acute CVA, muscle strength seems to be improving, continue IV thiamine.  Abnormal LFTs  Patient with hyperbilirbuinemia and elevated LFT in the setting of rhabdomyolysis and ETOH use    Likely multifactorial including alcohol use, fatty liver disease, rhabdomyolysis.    Continues to trend down    Gastroesophageal reflux disease without esophagitis  Protonix 40 mg daily    Outpatient EGD per GI  Alcohol abuse  Endorses at least 5 beers a day, although I suspect this is understated  Folic acid, thiamine  Patient did not exhibit any signs of acute alcohol withdrawal, CIWA protocol was not needed, it has been discontinued.  Hypokalemia  Monitor potassium level daily  Volume depletion  Continue IV fluid, encourage p.o. intake  Acute tubular necrosis (HCC)  Suspected component of ATN, nephrology consult appreciated.  Hypomagnesemia  IV magnesium ordered today  Hypophosphatemia  Phosphorus level normal today      Code Status: Level 1 - Full Code    Subjective   No pain    Objective :  Temp:  [98.1 °F (36.7 °C)-99.3 °F (37.4 °C)] 99.3 °F (37.4 °C)  HR:  [89-91] 91  BP:  (133-143)/(92-94) 133/92  Resp:  [18-22] 18  SpO2:  [96 %-97 %] 96 %  O2 Device: None (Room air)    Body mass index is 35.69 kg/m².     Input and Output Summary (last 24 hours):     Intake/Output Summary (Last 24 hours) at 2/14/2025 1818  Last data filed at 2/14/2025 1350  Gross per 24 hour   Intake 8026.67 ml   Output 4175 ml   Net 3851.67 ml       Physical Exam  Vitals and nursing note reviewed.   HENT:      Head: Normocephalic.   Cardiovascular:      Rate and Rhythm: Normal rate.      Pulses: Normal pulses.   Abdominal:      General: Bowel sounds are normal. There is no distension.      Palpations: Abdomen is soft.      Tenderness: There is no abdominal tenderness.   Musculoskeletal:      Right lower leg: Edema present.      Left lower leg: Edema present.   Skin:     General: Skin is warm and dry.      Findings: Bruising present.   Neurological:      General: No focal deficit present.      Mental Status: He is alert and oriented to person, place, and time. Mental status is at baseline.           Lines/Drains:              Lab Results: I have reviewed the following results:   Results from last 7 days   Lab Units 02/14/25  0509   WBC Thousand/uL 8.48   HEMOGLOBIN g/dL 9.4*   HEMATOCRIT % 29.1*   PLATELETS Thousands/uL 184   SEGS PCT % 73   LYMPHO PCT % 14   MONO PCT % 9   EOS PCT % 2     Results from last 7 days   Lab Units 02/14/25  0509   SODIUM mmol/L 139   POTASSIUM mmol/L 3.3*   CHLORIDE mmol/L 107   CO2 mmol/L 24   BUN mg/dL 25   CREATININE mg/dL 3.95*   ANION GAP mmol/L 8   CALCIUM mg/dL 7.3*   ALBUMIN g/dL 2.2*   TOTAL BILIRUBIN mg/dL 1.59*   ALK PHOS U/L 114*   ALT U/L 212*   AST U/L 240*   GLUCOSE RANDOM mg/dL 84     Results from last 7 days   Lab Units 02/12/25  0541   INR  1.28*                   Recent Cultures (last 7 days):         Imaging Results Review: No pertinent imaging studies reviewed.  Other Study Results Review: No additional pertinent studies reviewed.    Last 24 Hours Medication List:      Current Facility-Administered Medications:     acetaminophen (TYLENOL) tablet 650 mg, Q6H PRN    clopidogrel (PLAVIX) tablet 75 mg, Daily    folic acid (FOLVITE) tablet 1 mg, Daily    heparin (porcine) subcutaneous injection 5,000 Units, Q8H DAVION **AND** [COMPLETED] Platelet count, Once    loperamide (IMODIUM) capsule 4 mg, TID    metoprolol succinate (TOPROL-XL) 24 hr tablet 25 mg, Daily    moisture barrier miconazole 2% cream (aka MELODY MOISTURE BARRIER ANTIFUNGAL CREAM), BID    multi-electrolyte (PLASMALYTE-A/ISOLYTE-S PH 7.4) IV solution, Continuous, Last Rate: 100 mL/hr (02/14/25 1036)    nicotine (NICODERM CQ) 21 mg/24 hr TD 24 hr patch 1 patch, Daily    pantoprazole (PROTONIX) EC tablet 40 mg, Early Morning    potassium chloride (Klor-Con M20) CR tablet 40 mEq, BID    saccharomyces boulardii (FLORASTOR) capsule 250 mg, BID    simethicone (MYLICON) chewable tablet 80 mg, Q6H PRN    sodium bicarbonate 150 mEq in dextrose 5 % 1,000 mL infusion, Continuous, Last Rate: 20 mL/hr (02/14/25 1000)    thiamine (VITAMIN B1) 500 mg in sodium chloride 0.9 % 50 mL IVPB, Q8H DAVION, Last Rate: 500 mg (02/14/25 1429)    traZODone (DESYREL) tablet 50 mg, HS PRN    Administrative Statements   Today, Patient Was Seen By: Isaías Eller DO      **Please Note: This note may have been constructed using a voice recognition system.**

## 2025-02-14 NOTE — ASSESSMENT & PLAN NOTE
Likely due to prerenal azotemia and rhabdomyolysis, IV fluid management per nephrology    Suspected component of ATN, appreciate nephrology input    Creatinine trending down to 3.95

## 2025-02-14 NOTE — PLAN OF CARE
Problem: SKIN/TISSUE INTEGRITY - ADULT  Goal: Skin Integrity remains intact(Skin Breakdown Prevention)  Description: Assess:  -Perform Tani assessment every 2  -Clean and moisturize skin every incont. Episode   -Inspect skin when repositioning, toileting, and assisting with ADLS  -Assess extremities for adequate circulation and sensation     Bed Management:  -Have minimal linens on bed & keep smooth, unwrinkled  -Change linens as needed when moist or perspiring  -Avoid sitting or lying in one position for more than 2 hours while in bed  -Keep HOB at 30 degrees     Toileting:  -Offer bedside commode  -Assess for incontinence every shift  -Use incontinent care products after each incontinent episode such as alejandra wipes    Activity:  -Mobilize patient 3 times a day  -Encourage activity and walks on unit  -Encourage or provide ROM exercises   -Turn and reposition patient every 2 Hours  -Use appropriate equipment to lift or move patient in bed  -Instruct/ Assist with weight shifting every 60 mins when out of bed in chair  -Consider limitation of chair time 6 hour intervals    Skin Care:  -Avoid use of baby powder, tape, friction and shearing, hot water or constrictive clothing  -Relieve pressure over bony prominences using foam wedges/pillows  -Do not massage red bony areas    Outcome: Progressing     Problem: GASTROINTESTINAL - ADULT  Goal: Maintains or returns to baseline bowel function  Description: INTERVENTIONS:  - Assess bowel function  - Encourage oral fluids to ensure adequate hydration  - Administer IV fluids if ordered to ensure adequate hydration  - Administer ordered medications as needed  - Encourage mobilization and activity  - Consider nutritional services referral to assist patient with adequate nutrition and appropriate food choices  Outcome: Progressing

## 2025-02-14 NOTE — PROGRESS NOTES
NEPHROLOGY HOSPITAL PROGRESS NOTE   Emiliano Rodriguez 49 y.o. male MRN: [id number]  Unit/Bed#: 404-01 Encounter: [id number]    Assessment & Plan  Acute renal failure (ARF) (HCC)  Etiology is attributed to acute tubular necrosis from volume depletion and intrinsic causes from rhabdomyolysis.there has been improvement in the patient's kidney function.  At its peak the creatinine was as high as 4.7 during this admission but as of today on February 14 creatinine has improved to 3.9.  It was 4.21 February 13.  The patient is also nonoliguric and may be developing diuretic ATN.  Need to follow hemodynamics and fluid status closely as patient is still having significant diarrhea for which the patient required a rectal tube.    The patient had been on 2 different IV fluids up to this point as the patient had a significant volume deficit as well as a bicarbonate deficit that needed to both be addressed given ongoing diarrhea as well as GUNJAN in the setting of rhabdomyolysis for which the patient required aggressive volume resuscitation as well.  The patient's bicarbonate as of today has improved to 24.  Will change IV fluids to Isolyte and monitor it at 100 cc an hour and we will decrease IVF with bicarbonate and slowly decrease but need to follow bicarbonate levels especially as the patient is still having significant diarrheal output. Continue to follow hemodynamics and we will recheck labs later today but the balance solution with electrolytes contained within it should also help to address some electrolyte abnormalities as well.  If patient develops worsening bicarbonate level can readd IV bicarbonate infusion.    Acute tubular necrosis (HCC)  Continue supportive care at this time; the patient may be developing nonoliguric ATN.  Hyponatremia  This has improved and sodium on February 14 is 139.  Hypokalemia  Potassium is 3.3 on February 14 today with a magnesium of 1.8.  Will replace potassium and adjust maintenance dosing  of it has not been done already.  Will also replace magnesium.  Regarding other electrolytes phosphorus has improved to 3.4.  Volume depletion  This has slightly improved, adjusting IV fluids as above.  Diarrhea  Continue with diarrhea management per gastroenterology; appreciate input.  Hypomagnesemia  Will replace magnesium intravenously.  Hypophosphatemia  This has improved status post replacement.  Follow daily levels.  Rhabdomyolysis  CK decreased from as high as 18,000 on February 10 to 4300 on February 13.  Continue to trend levels.  Gastroesophageal reflux disease without esophagitis  Continue with PPI, management per GI.  Alcohol abuse  Continue [facility] protocol, further care and management according to hospitalist recommendations.  Abnormal LFTs  Potentially related to alcohol intake, although increased AST can also be secondary to rhabdomyolysis.  ALT levels have improved from admission as have AST levels.  Patient's bilirubin levels have also improved.  Appreciate GI input.  Acute diverticulitis  Status post course of ceftriaxone and metronidazole    Administrative Statements   VIRTUAL CARE DOCUMENTATION:     1. This service was provided via Telemedicine using [employer]     2. Parties in the room with patient during teleconsult Patient only    3. Confidentiality My office door was closed     4. Participants No one else was in the room    5. Patient acknowledged consent and understanding of privacy and security of the  Telemedicine consult. I informed the patient that I have reviewed their record in Epic and presented the opportunity for them to ask any questions regarding the visit today.  The patient agreed to participate.    6. I have spent a total time of approximately 30 minutes in caring for this patient on the day of the visit/encounter including documentation of medical decision making, patient visit, review of medical record, modification of assessment and plan, order placement, not including the  time spent for establishing the audio/video connection.          SUBJECTIVE / 24H INTERVAL HISTORY:  Mr. Rodriguez is seen in follow-up for acute kidney injury.  The patient this morning was staying still having significant diarrhea.  It was noted that over the past 24 hours the patient had 14 episodes of diarrhea 13 over the past 12 hours.  Regarding urine output patient has been nonoliguric and is -2.1 L.  He denies any abdominal pain nausea or vomiting.  He denies any dizziness or lightheadedness.    Regarding hemodynamics, systolic blood pressure on February 13 has been anywhere from 118-143 systolic and pulse oximetry is 97% on room air.  The patient's weight is 100 kg not sure reliability is prior weight on February 7 was 200 pounds.  The patient is again noted to be net negative as noted above.    OBJECTIVE:  Current Weight: Weight - Scale: 100 kg (221 lb 1.9 oz)  Vitals:    02/13/25 1654 02/13/25 2011 02/14/25 0552 02/14/25 0652   BP: 118/94 143/94  133/92   BP Location:    Right arm   Pulse: 95 89  91   Resp:  22  18   Temp: 98.1 °F (36.7 °C) 98.1 °F (36.7 °C)  99.3 °F (37.4 °C)   TempSrc:    Tympanic   SpO2: 97% 97%  96%   Weight:   100 kg (221 lb 1.9 oz)    Height:           Intake/Output Summary (Last 24 hours) at 2/14/2025 0943  Last data filed at 2/14/2025 0837  Gross per 24 hour   Intake 1220 ml   Output 3375 ml   Net -2155 ml     General: Patient is not in any acute distress.  HEENT: Normocephalic atraumatic  Neck: Appears to be supple  Pulmonary: There is no accessory muscle use noted/no conversational dyspnea  Cardiac: Pulse rate is in the 80s to 90s  Extremities: There is lower extremity edema noted.  Neurologic: No focal deficits noted.  Medications:    Current Facility-Administered Medications:     acetaminophen (TYLENOL) tablet 650 mg, 650 mg, Oral, Q6H PRN, Prudencio Nix MD, 650 mg at 02/10/25 1126    aluminum-magnesium hydroxide-simethicone (MAALOX) oral suspension 30 mL, 30 mL,  Oral, Q4H PRN, Dominique Farris MD, 30 mL at 02/13/25 2024    clopidogrel (PLAVIX) tablet 75 mg, 75 mg, Oral, Daily, Ankita Katz MD, 75 mg at 02/14/25 0805    folic acid (FOLVITE) tablet 1 mg, 1 mg, Oral, Daily, Ankita Katz MD, 1 mg at 02/14/25 0805    heparin (porcine) subcutaneous injection 5,000 Units, 5,000 Units, Subcutaneous, Q8H DAVION, 5,000 Units at 02/14/25 0511 **AND** [COMPLETED] Platelet count, , , Once, Ankita Katz MD    loperamide (IMODIUM) capsule 4 mg, 4 mg, Oral, TID, Martha Gagnon PA-C    metoprolol succinate (TOPROL-XL) 24 hr tablet 25 mg, 25 mg, Oral, Daily, Ankita Katz MD, 25 mg at 02/14/25 0805    moisture barrier miconazole 2% cream (aka MELODY MOISTURE BARRIER ANTIFUNGAL CREAM), , Topical, BID, Isaías Eller DO, Given at 02/14/25 0810    nicotine (NICODERM CQ) 21 mg/24 hr TD 24 hr patch 1 patch, 1 patch, Transdermal, Daily, Ankita Katz MD, 1 patch at 02/14/25 0810    pantoprazole (PROTONIX) EC tablet 40 mg, 40 mg, Oral, Early Morning, Ankita Katz MD, 40 mg at 02/14/25 0511    saccharomyces boulardii (FLORASTOR) capsule 250 mg, 250 mg, Oral, BID, Ankita Katz MD, 250 mg at 02/14/25 0805    sodium bicarbonate 150 mEq in dextrose 5 % 1,000 mL infusion, 50 mL/hr, Intravenous, Continuous, Rick Lopez PA-C, Last Rate: 50 mL/hr at 02/14/25 0805, 50 mL/hr at 02/14/25 0805    sodium chloride 0.9 % infusion, 100 mL/hr, Intravenous, Continuous, LAURIE Vitale, Last Rate: 100 mL/hr at 02/13/25 2206, 100 mL/hr at 02/13/25 2206    thiamine (VITAMIN B1) 500 mg in sodium chloride 0.9 % 50 mL IVPB, 500 mg, Intravenous, Q8H DAVION, Isaías Eller DO, Last Rate: 100 mL/hr at 02/14/25 0511, 500 mg at 02/14/25 0511    traZODone (DESYREL) tablet 50 mg, 50 mg, Oral, HS PRN, Garcia Escobar MD, 50 mg at 02/14/25 0018    Laboratory Results:  Results from last 7 days   Lab Units 02/14/25  0509 02/13/25  0800 02/12/25  0541 02/11/25  0420 02/10/25  0406 02/09/25  0546  "02/08/25  0903 02/08/25  0437   WBC Thousand/uL 8.48  --  8.89 8.37 9.56 8.39  --  6.95   HEMOGLOBIN g/dL 9.4*  --  9.8* 9.6* 10.0* 10.6*  --  10.8*   HEMATOCRIT % 29.1*  --  30.0* 29.2* 31.4* 33.2*  --  32.8*   PLATELETS Thousands/uL 184  --  134* 128* 119* 118*  --  108*   POTASSIUM mmol/L 3.3* 3.6 3.4* 3.2* 3.4* 4.0 3.6 3.2*  3.3*   CHLORIDE mmol/L 107 106 106 106 107 110* 104 103  104   CO2 mmol/L 24 23 23 20* 16* 15* 18* 20*  19*   BUN mg/dL 25 29* 33* 36* 34* 29* 25 24  24   CREATININE mg/dL 3.95* 4.20* 4.44* 4.64* 4.78* 4.66* 4.30* 4.06*  4.06*   CALCIUM mg/dL 7.3* 7.4* 7.3* 7.2* 7.2* 7.6* 8.2* 8.1*  8.0*   MAGNESIUM mg/dL 1.8* 1.7* 1.8* 1.5*  --  1.6*  --   --    PHOSPHORUS mg/dL 3.4 2.5* 1.8*  --   --  3.0  --  2.6*       Portions of the record may have been created with voice recognition software. Occasional wrong word or \"sound a like\" substitutions may have occurred due to the inherent limitations of voice recognition software. Read the chart carefully and recognize, using context, where substitutions have occurred.If you have any questions, please contact the dictating provider.   "

## 2025-02-14 NOTE — UTILIZATION REVIEW
Continued Stay Review    Date: 2/14/25                          Current Patient Class: Inpatient Current Level of Care: MS    HPI:49 y.o. male initially admitted on 2/7/25   Current Diagnosis: Rhabdomyolysis. ARF. Acute Diverticulitis.     2/14/25 Assessment/Plan:   Pt continues to have significant volume deficity as well as bicarb deficit due to ongoing diarrhea as well as GUNJAN in setting of Rhabdo. Bicarb improved today to 24. Stool studies neg for infection. Abnormal labs: K 3.3, Creat 3.95, Ca 7.3, , , Alk phos 114, T Protein 4.9, Albumin 2.2, T Bili 1.59, Mg 1.8, Total CK 2.106H/H 9.4/29.1. LFT improving. Plan: Change IV fluids NSS to Isolyte @ 100 ml/hr, Continue Sodium bicarb gtt.  Replete K and Mg, Continue IV Thiamine. Add anti-diarrheals. Continue probiotics, Banatrol supplement. Continue to trend CK and LFT levels. BMP and CBC in am.      Medications:   Scheduled Medications:  clopidogrel, 75 mg, Oral, Daily  folic acid, 1 mg, Oral, Daily  heparin (porcine), 5,000 Units, Subcutaneous, Q8H DAVION  loperamide, 4 mg, Oral, TID  metoprolol succinate, 25 mg, Oral, Daily  MELODY ANTIFUNGAL, , Topical, BID  nicotine, 1 patch, Transdermal, Daily  pantoprazole, 40 mg, Oral, Early Morning  potassium chloride, 40 mEq, Oral, BID  saccharomyces boulardii, 250 mg, Oral, BID  thiamine, 500 mg, Intravenous, Q8H DAVION  calcium gluconate 1 g in sodium chloride 0.9% 50 mL (premix)  Dose: 1 g  Freq: Once Route: IV  Last Dose: Stopped (02/13/25 1737)  magnesium sulfate 2 g/50 mL IVPB (premix) 2 g  Dose: 2 g  Freq: Once Route: IV  Last Dose: 2 g (02/14/25 1036)  Start: 02/14/25 1000 End: 02/14/25 1436  magnesium sulfate 2 g/50 mL IVPB (premix) 2 g  Dose: 2 g  Freq: Once Route: IV  Last Dose: Stopped (02/13/25 1230)  Start: 02/13/25 1030 End: 02/13/25 1230    potassium chloride (Klor-Con M20) CR tablet 40 mEq  Dose: 40 mEq  Freq: 2 times daily Route: PO  Start: 02/14/25 1000 End: 02/15/25 7480        Continuous IV  Infusions:  multi-electrolyte, 100 mL/hr, Intravenous, Continuous  sodium bicarbonate 150 mEq in dextrose 5 % 1,000 mL infusion, 20 mL/hr, Intravenous, Continuous  sodium chloride 0.9 % infusion  Rate: 100 mL/hr Dose: 100 mL/hr  Freq: Continuous Route: IV  Indications of Use: IV Resuscitation  Last Dose: Stopped (02/14/25 1000)  Start: 02/08/25 1230 End: 02/14/25 0957    PRN Meds:  acetaminophen, 650 mg, Oral, Q6H PRN  simethicone, 80 mg, Oral, Q6H PRN  traZODone, 50 mg, Oral, HS PRN      Discharge Plan: TBD    Vital Signs (last 3 days)       Date/Time Temp Pulse Resp BP MAP (mmHg) SpO2 O2 Device Patient Position - Orthostatic VS Shu Coma Scale Score CIWA-Ar Total Pain    02/14/25 0900 -- -- -- -- -- -- -- -- 15 -- No Pain    02/14/25 06:52:27 99.3 °F (37.4 °C) 91 18 133/92 106 96 % None (Room air) Lying -- -- --    02/14/25 0014 -- -- -- -- -- -- -- -- 15 -- No Pain    02/13/25 2011 98.1 °F (36.7 °C) 89 22 143/94 110 97 % -- -- -- -- --    02/13/25 16:54:14 98.1 °F (36.7 °C) 95 -- 118/94 102 97 % -- -- -- -- --    02/13/25 16:30:55 -- -- -- 133/88 103 -- -- -- -- -- --    02/13/25 1229 -- -- -- -- -- -- -- -- -- -- No Pain    02/13/25 08:55:13 97.6 °F (36.4 °C) 96 18 128/94 105 95 % -- Lying -- -- --    02/13/25 0853 -- 96 -- 128/94 -- -- -- -- -- -- --    02/12/25 21:46:09 98.1 °F (36.7 °C) 79 -- 150/100 117 98 % -- -- -- -- --    02/12/25 1958 -- -- -- -- -- 97 % None (Room air) -- -- -- No Pain    02/12/25 14:35:14 98.1 °F (36.7 °C) 90 13 113/90 98 96 % None (Room air) Lying -- -- --    02/12/25 1242 -- -- -- -- -- -- -- -- -- -- No Pain    02/12/25 07:10:13 98.1 °F (36.7 °C) 84 13 139/97 111 98 % -- Lying -- -- --    02/11/25 21:52:40 98.3 °F (36.8 °C) 89 -- 140/85 103 91 % -- -- -- -- --    02/11/25 2030 -- -- -- -- -- 97 % None (Room air) -- -- -- No Pain    02/11/25 18:50:14 98.2 °F (36.8 °C) 84 -- 135/93 107 95 % -- -- -- -- --    02/11/25 16:39:21 98.1 °F (36.7 °C) 81 15 106/90 95 93 % -- Lying -- --  --    02/11/25 0737 -- -- -- -- -- -- None (Room air) -- -- -- --    02/11/25 07:25:11 97.9 °F (36.6 °C) 86 17 125/94 104 95 % None (Room air) Lying -- -- --    02/11/25 04:01:21 98.1 °F (36.7 °C) -- 18 123/83 96 -- -- -- -- -- --    02/11/25 0400 -- -- -- -- -- -- -- -- -- 0 --    02/11/25 0000 -- -- -- -- -- -- -- -- -- 0 --          Weight (last 2 days)       Date/Time Weight    02/14/25 0552 100 (221.12)            Pertinent Labs/Diagnostic Results:   Radiology:  MRI brain wo contrast   Final Interpretation by Driss Cortes MD (02/12 1114)      No mass effect, acute intracranial hemorrhage or evidence of recent infarction.      Small chronic infarcts, as described above. Mild chronic microvascular ischemic changes mildly worsened since the prior exam.         Resident: ELIZABETH Welch I, the attending radiologist, have reviewed the images and agree with the final report above.      Workstation performed: [id number]         XR hip/pelv 2-3 vws left if performed   Final Interpretation by Josué Glasgow MD (02/12 0820)      Arthritis of both hips. No acute findings         Computerized Assisted Algorithm (CAA) may have been used to analyze all applicable images.            Workstation performed: [id number]         XR femur 2 vw left   Final Interpretation by Josué Glasgow MD (02/12 0817)      Limited field-of-view exam shows no acute findings. Arthritis at the knee         Computerized Assisted Algorithm (CAA) may have been used to analyze all applicable images.         Workstation performed: [id number]         US kidney and bladder with pvr   Final Interpretation by Génesis Adair MD (02/08 0647)   Technically limited study. Left lower pole cyst not well visualized.   No hydronephrosis.   20 mL post void residual in the bladder.               Workstation performed: [id number]         US right upper quadrant with liver dopplers   Final Interpretation by Génesis Adair MD (02/08 0370)    Hepatomegaly. Steatosis, also demonstrated on the recent CT.   No biliary dilatation.   Patent major hepatic vessels with normodirectional flow.         Workstation performed: [id number]         CT head wo contrast   Final Interpretation by Timo Walters MD (02/07 0634)      No acute intracranial abnormality.      Mildly increased ventricular prominence since the prior studies raising the question of normal pressure hydrocephalus.            Workstation performed: [id number]         CT abdomen pelvis wo contrast   Final Interpretation by Timo Walters MD (02/07 0649)      Acute sigmoid diverticulitis. Inflamed sigmoid abuts the dome of the urinary bladder, with associated bladder wall thickening raising the question of developing fistula.      Hepatosplenomegaly with hepatic steatosis.      Indeterminate 1.6 cm left renal cyst. Recommend follow-up ultrasound.      Nonobstructing right renal calculus.      The study was marked in EPIC for immediate notification.      Workstation performed: [id number]         XR chest 1 view portable   Final Interpretation by Timo Walters MD (02/07 0802)      No acute cardiopulmonary disease.            Workstation performed: [id number]           Cardiology:  ECG 12 lead   Final Result by Josué Wills DO (02/07 0846)   Normal sinus rhythm   Possible Lateral infarct (cited on or before 31-Dec-2018)   Possible Inferior infarct (cited on or before 31-Dec-2018)   Prolonged QT   Abnormal ECG   When compared with ECG of 31-Dec-2018 14:40,   Questionable change in initial forces of Inferior leads   ST now depressed in Anterior leads   Nonspecific T wave abnormality now evident in Anterior leads   Confirmed by Josué Wills ([id number]) on 2/7/2025 8:46:02 AM        GI:  No orders to display           Results from last 7 days   Lab Units 02/14/25  0509 02/12/25  0541 02/11/25  0420 02/10/25  0406 02/09/25  0546   WBC Thousand/uL 8.48 8.89 8.37 9.56 8.39    HEMOGLOBIN g/dL 9.4* 9.8* 9.6* 10.0* 10.6*   HEMATOCRIT % 29.1* 30.0* 29.2* 31.4* 33.2*   PLATELETS Thousands/uL 184 134* 128* 119* 118*   TOTAL NEUT ABS Thousands/µL 6.27 6.88 6.50 7.75* 6.73         Results from last 7 days   Lab Units 02/14/25  0509 02/13/25  0800 02/12/25  0541 02/11/25  0420 02/10/25  0406 02/09/25  0546 02/08/25  0903 02/08/25  0437   SODIUM mmol/L 139 137 134* 135 134* 135   < > 132*  133*   POTASSIUM mmol/L 3.3* 3.6 3.4* 3.2* 3.4* 4.0   < > 3.2*  3.3*   CHLORIDE mmol/L 107 106 106 106 107 110*   < > 103  104   CO2 mmol/L 24 23 23 20* 16* 15*   < > 20*  19*   ANION GAP mmol/L 8 8 5 9 11 10   < > 9  10   BUN mg/dL 25 29* 33* 36* 34* 29*   < > 24  24   CREATININE mg/dL 3.95* 4.20* 4.44* 4.64* 4.78* 4.66*   < > 4.06*  4.06*   EGFR ml/min/1.73sq m 16 15 14 13 13 13   < > 16  16   CALCIUM mg/dL 7.3* 7.4* 7.3* 7.2* 7.2* 7.6*   < > 8.1*  8.0*   MAGNESIUM mg/dL 1.8* 1.7* 1.8* 1.5*  --  1.6*  --   --    PHOSPHORUS mg/dL 3.4 2.5* 1.8*  --   --  3.0  --  2.6*    < > = values in this interval not displayed.     Results from last 7 days   Lab Units 02/14/25  0509 02/12/25  0541 02/11/25 0420 02/10/25  0406 02/09/25  0546   AST U/L 240* 471* 683* 880* 1,012*   ALT U/L 212* 286* 323* 335* 334*   ALK PHOS U/L 114* 113* 111* 113* 121*   TOTAL PROTEIN g/dL 4.9* 5.0* 4.9* 4.9* 5.2*   ALBUMIN g/dL 2.2* 2.2* 2.0* 2.0* 2.1*   TOTAL BILIRUBIN mg/dL 1.59* 2.04* 2.27* 2.73* 3.25*   BILIRUBIN DIRECT mg/dL  --   --  1.25*  --   --          Results from last 7 days   Lab Units 02/14/25  0509 02/13/25  0800 02/12/25  0541 02/11/25  0420 02/10/25  0406 02/09/25  0546 02/08/25  0903 02/08/25  0437 02/07/25  2355 02/07/25 2001   GLUCOSE RANDOM mg/dL 84 113 85 81 71 70 87 74  75 91 117       Results from last 7 days   Lab Units 02/14/25  0509 02/13/25  0800 02/12/25  0541   CK TOTAL U/L 3,106* 4,385* 6,911*             Results from last 7 days   Lab Units 02/12/25  0541   PROTIME seconds 16.5*   INR  1.28*      Results from last 7 days   Lab Units 02/08/25  1439   SODIUM UR mmol/L 29.0   CREATININE UR mg/dL 51.0               Network Utilization Review Department  ATTENTION: Please call with any questions or concerns to [phone] and carefully listen to the prompts so that you are directed to the right person. All voicemails are confidential.   For Discharge needs, contact Care Management DC Support Team at [phone] opt. 2  Send all requests for admission clinical reviews, approved or denied determinations and any other requests to dedicated fax number below belonging to the [facility] where the patient is receiving treatment. List of dedicated fax numbers for the Facilities:  FACILITY NAME UR FAX NUMBER   ADMISSION DENIALS (Administrative/Medical Necessity) [phone]   DISCHARGE SUPPORT TEAM (NETWORK) [phone]   PARENT CHILD HEALTH (Maternity/NICU/Pediatrics) [phone]   [facility] [phone]   [facility] [phone]   [facility] [phone]   [facility] [phone]   [facility] [phone]   [facility] [phone]   [facility] [phone]   [facility] [phone]   [facility] [phone]   [facility] [phone]   [facility] [phone]   [facility] [phone]

## 2025-02-14 NOTE — ASSESSMENT & PLAN NOTE
CK decreased from as high as 18,000 on February 10 to 4300 on February 13.  Continue to trend levels.

## 2025-02-15 PROBLEM — K57.92 ACUTE DIVERTICULITIS: Status: RESOLVED | Noted: 2025-02-07 | Resolved: 2025-02-15

## 2025-02-15 PROBLEM — E87.1 HYPONATREMIA: Status: RESOLVED | Noted: 2025-02-07 | Resolved: 2025-02-15

## 2025-02-15 LAB
ANION GAP SERPL CALCULATED.3IONS-SCNC: 8 MMOL/L (ref 4–13)
BUN SERPL-MCNC: 22 MG/DL (ref 5–25)
CALCIUM SERPL-MCNC: 7.3 MG/DL (ref 8.4–10.2)
CHLORIDE SERPL-SCNC: 108 MMOL/L (ref 96–108)
CK SERPL-CCNC: 2138 U/L (ref 39–308)
CO2 SERPL-SCNC: 25 MMOL/L (ref 21–32)
CREAT SERPL-MCNC: 3.79 MG/DL (ref 0.6–1.3)
GFR SERPL CREATININE-BSD FRML MDRD: 17 ML/MIN/1.73SQ M
GLUCOSE SERPL-MCNC: 88 MG/DL (ref 65–140)
MAGNESIUM SERPL-MCNC: 1.8 MG/DL (ref 1.9–2.7)
PHOSPHATE SERPL-MCNC: 3.5 MG/DL (ref 2.7–4.5)
POTASSIUM SERPL-SCNC: 3.7 MMOL/L (ref 3.5–5.3)
SODIUM SERPL-SCNC: 141 MMOL/L (ref 135–147)

## 2025-02-15 PROCEDURE — 99232 SBSQ HOSP IP/OBS MODERATE 35: CPT | Performed by: INTERNAL MEDICINE

## 2025-02-15 PROCEDURE — 83735 ASSAY OF MAGNESIUM: CPT | Performed by: INTERNAL MEDICINE

## 2025-02-15 PROCEDURE — 84100 ASSAY OF PHOSPHORUS: CPT | Performed by: INTERNAL MEDICINE

## 2025-02-15 PROCEDURE — 80048 BASIC METABOLIC PNL TOTAL CA: CPT

## 2025-02-15 PROCEDURE — 82550 ASSAY OF CK (CPK): CPT | Performed by: INTERNAL MEDICINE

## 2025-02-15 RX ORDER — POTASSIUM CHLORIDE 1500 MG/1
40 TABLET, EXTENDED RELEASE ORAL 2 TIMES DAILY
Status: COMPLETED | OUTPATIENT
Start: 2025-02-15 | End: 2025-02-16

## 2025-02-15 RX ORDER — MAGNESIUM SULFATE HEPTAHYDRATE 40 MG/ML
2 INJECTION, SOLUTION INTRAVENOUS 2 TIMES DAILY
Status: COMPLETED | OUTPATIENT
Start: 2025-02-15 | End: 2025-02-16

## 2025-02-15 RX ADMIN — MICONAZOLE NITRATE 1 APPLICATION: 20 CREAM TOPICAL at 09:28

## 2025-02-15 RX ADMIN — FOLIC ACID 1 MG: 1 TABLET ORAL at 09:27

## 2025-02-15 RX ADMIN — METOPROLOL SUCCINATE 25 MG: 25 TABLET, EXTENDED RELEASE ORAL at 09:27

## 2025-02-15 RX ADMIN — MICONAZOLE NITRATE 1 APPLICATION: 20 CREAM TOPICAL at 17:11

## 2025-02-15 RX ADMIN — SIMETHICONE 80 MG: 80 TABLET, CHEWABLE ORAL at 11:07

## 2025-02-15 RX ADMIN — SIMETHICONE 80 MG: 80 TABLET, CHEWABLE ORAL at 22:19

## 2025-02-15 RX ADMIN — TRAZODONE HYDROCHLORIDE 50 MG: 50 TABLET ORAL at 23:52

## 2025-02-15 RX ADMIN — SIMETHICONE 80 MG: 80 TABLET, CHEWABLE ORAL at 03:56

## 2025-02-15 RX ADMIN — HEPARIN SODIUM 5000 UNITS: 5000 INJECTION, SOLUTION INTRAVENOUS; SUBCUTANEOUS at 13:29

## 2025-02-15 RX ADMIN — LOPERAMIDE HYDROCHLORIDE 4 MG: 2 CAPSULE ORAL at 22:19

## 2025-02-15 RX ADMIN — NICOTINE 1 PATCH: 21 PATCH, EXTENDED RELEASE TRANSDERMAL at 09:28

## 2025-02-15 RX ADMIN — Medication 250 MG: at 09:28

## 2025-02-15 RX ADMIN — CLOPIDOGREL 75 MG: 75 TABLET ORAL at 09:27

## 2025-02-15 RX ADMIN — PANTOPRAZOLE SODIUM 40 MG: 40 TABLET, DELAYED RELEASE ORAL at 05:37

## 2025-02-15 RX ADMIN — POTASSIUM CHLORIDE 40 MEQ: 1500 TABLET, EXTENDED RELEASE ORAL at 09:27

## 2025-02-15 RX ADMIN — SODIUM CHLORIDE, SODIUM GLUCONATE, SODIUM ACETATE, POTASSIUM CHLORIDE, MAGNESIUM CHLORIDE, SODIUM PHOSPHATE, DIBASIC, AND POTASSIUM PHOSPHATE 100 ML/HR: .53; .5; .37; .037; .03; .012; .00082 INJECTION, SOLUTION INTRAVENOUS at 09:31

## 2025-02-15 RX ADMIN — Medication 250 MG: at 17:09

## 2025-02-15 RX ADMIN — POTASSIUM CHLORIDE 40 MEQ: 1500 TABLET, EXTENDED RELEASE ORAL at 17:10

## 2025-02-15 RX ADMIN — SIMETHICONE 80 MG: 80 TABLET, CHEWABLE ORAL at 17:09

## 2025-02-15 RX ADMIN — POTASSIUM CHLORIDE 40 MEQ: 1500 TABLET, EXTENDED RELEASE ORAL at 11:10

## 2025-02-15 RX ADMIN — MAGNESIUM SULFATE HEPTAHYDRATE 2 G: 40 INJECTION, SOLUTION INTRAVENOUS at 11:09

## 2025-02-15 RX ADMIN — LOPERAMIDE HYDROCHLORIDE 4 MG: 2 CAPSULE ORAL at 09:27

## 2025-02-15 RX ADMIN — MAGNESIUM SULFATE HEPTAHYDRATE 2 G: 40 INJECTION, SOLUTION INTRAVENOUS at 22:19

## 2025-02-15 RX ADMIN — THIAMINE HYDROCHLORIDE 500 MG: 100 INJECTION, SOLUTION INTRAMUSCULAR; INTRAVENOUS at 05:37

## 2025-02-15 RX ADMIN — HEPARIN SODIUM 5000 UNITS: 5000 INJECTION, SOLUTION INTRAVENOUS; SUBCUTANEOUS at 22:19

## 2025-02-15 RX ADMIN — HEPARIN SODIUM 5000 UNITS: 5000 INJECTION, SOLUTION INTRAVENOUS; SUBCUTANEOUS at 05:37

## 2025-02-15 RX ADMIN — LOPERAMIDE HYDROCHLORIDE 4 MG: 2 CAPSULE ORAL at 17:08

## 2025-02-15 NOTE — PLAN OF CARE
Problem: MUSCULOSKELETAL - ADULT  Goal: Maintain or return mobility to safest level of function  Description: INTERVENTIONS:  - Assess patient's ability to carry out ADLs; assess patient's baseline for ADL function and identify physical deficits which impact ability to perform ADLs (bathing, care of mouth/teeth, toileting, grooming, dressing, etc.)  - Assess/evaluate cause of self-care deficits   - Assess range of motion  - Assess patient's mobility  - Assess patient's need for assistive devices and provide as appropriate  - Encourage maximum independence but intervene and supervise when necessary  - Involve family in performance of ADLs  - Assess for home care needs following discharge   - Consider OT consult to assist with ADL evaluation and planning for discharge  - Provide patient education as appropriate  Outcome: Progressing     Problem: GASTROINTESTINAL - ADULT  Goal: Maintains or returns to baseline bowel function  Description: INTERVENTIONS:  - Assess bowel function  - Encourage oral fluids to ensure adequate hydration  - Administer IV fluids if ordered to ensure adequate hydration  - Administer ordered medications as needed  - Encourage mobilization and activity  - Consider nutritional services referral to assist patient with adequate nutrition and appropriate food choices  Outcome: Progressing

## 2025-02-15 NOTE — ASSESSMENT & PLAN NOTE
Potassium is 3.7 on February 15 today with a magnesium of 1.8.  Will replace potassium and adjust maintenance dosing of it has not been done already.  Will also replace magnesium.  Regarding other electrolytes phosphorus has improved to 3.5.

## 2025-02-15 NOTE — PLAN OF CARE
Problem: SKIN/TISSUE INTEGRITY - ADULT  Goal: Skin Integrity remains intact(Skin Breakdown Prevention)  Description: Assess:  -Perform Tani assessment every 2  -Clean and moisturize skin every incont. Episode   -Inspect skin when repositioning, toileting, and assisting with ADLS  -Assess extremities for adequate circulation and sensation     Bed Management:  -Have minimal linens on bed & keep smooth, unwrinkled  -Change linens as needed when moist or perspiring  -Avoid sitting or lying in one position for more than 2 hours while in bed  -Keep HOB at 30 degrees     Toileting:  -Offer bedside commode  -Assess for incontinence every shift  -Use incontinent care products after each incontinent episode such as alejandra wipes    Activity:  -Mobilize patient 3 times a day  -Encourage activity and walks on unit  -Encourage or provide ROM exercises   -Turn and reposition patient every 2 Hours  -Use appropriate equipment to lift or move patient in bed  -Instruct/ Assist with weight shifting every 60 mins when out of bed in chair  -Consider limitation of chair time 6 hour intervals    Skin Care:  -Avoid use of baby powder, tape, friction and shearing, hot water or constrictive clothing  -Relieve pressure over bony prominences using foam wedges/pillows  -Do not massage red bony areas    Outcome: Progressing     Problem: MUSCULOSKELETAL - ADULT  Goal: Maintain or return mobility to safest level of function  Description: INTERVENTIONS:  - Assess patient's ability to carry out ADLs; assess patient's baseline for ADL function and identify physical deficits which impact ability to perform ADLs (bathing, care of mouth/teeth, toileting, grooming, dressing, etc.)  - Assess/evaluate cause of self-care deficits   - Assess range of motion  - Assess patient's mobility  - Assess patient's need for assistive devices and provide as appropriate  - Encourage maximum independence but intervene and supervise when necessary  - Involve family in  performance of ADLs  - Assess for home care needs following discharge   - Consider OT consult to assist with ADL evaluation and planning for discharge  - Provide patient education as appropriate  Outcome: Progressing     Problem: PAIN - ADULT  Goal: Verbalizes/displays adequate comfort level or baseline comfort level  Description: Interventions:  - Encourage patient to monitor pain and request assistance  - Assess pain using appropriate pain scale  - Administer analgesics based on type and severity of pain and evaluate response  - Implement non-pharmacological measures as appropriate and evaluate response  - Consider cultural and social influences on pain and pain management  - Notify physician/advanced practitioner if interventions unsuccessful or patient reports new pain  Outcome: Progressing     Problem: INFECTION - ADULT  Goal: Absence or prevention of progression during hospitalization  Description: INTERVENTIONS:  - Assess and monitor for signs and symptoms of infection  - Monitor lab/diagnostic results  - Monitor all insertion sites, i.e. indwelling lines, tubes, and drains  - Monitor endotracheal if appropriate and nasal secretions for changes in amount and color  - Leivasy appropriate cooling/warming therapies per order  - Administer medications as ordered  - Instruct and encourage patient and family to use good hand hygiene technique  - Identify and instruct in appropriate isolation precautions for identified infection/condition  Outcome: Progressing     Problem: SAFETY ADULT  Goal: Patient will remain free of falls  Description: INTERVENTIONS:  - Educate patient/family on patient safety including physical limitations  - Instruct patient to call for assistance with activity   - Consult OT/PT to assist with strengthening/mobility   - Keep Call bell within reach  - Keep bed low and locked with side rails adjusted as appropriate  - Keep care items and personal belongings within reach  - Initiate and  maintain comfort rounds  - Make Fall Risk Sign visible to staff  - Offer Toileting every 2 Hours, in advance of need  - Initiate/Maintain bed/chair alarm  - Obtain necessary fall risk management equipment: non skid socks  - Apply yellow socks and bracelet for high fall risk patients  - Consider moving patient to room near nurses station  Outcome: Progressing  Goal: Maintain or return to baseline ADL function  Description: INTERVENTIONS:  -  Assess patient's ability to carry out ADLs; assess patient's baseline for ADL function and identify physical deficits which impact ability to perform ADLs (bathing, care of mouth/teeth, toileting, grooming, dressing, etc.)  - Assess/evaluate cause of self-care deficits   - Assess range of motion  - Assess patient's mobility; develop plan if impaired  - Assess patient's need for assistive devices and provide as appropriate  - Encourage maximum independence but intervene and supervise when necessary  - Involve family in performance of ADLs  - Assess for home care needs following discharge   - Consider OT consult to assist with ADL evaluation and planning for discharge  - Provide patient education as appropriate  Outcome: Progressing  Goal: Maintains/Returns to pre admission functional level  Description: INTERVENTIONS:  - Perform AM-PAC 6 Click Basic Mobility/ Daily Activity assessment daily.  - Set and communicate daily mobility goal to care team and patient/family/caregiver.   - Collaborate with rehabilitation services on mobility goals if consulted  - Perform Range of Motion 3 times a day.  - Reposition patient every 2 hours.  - Dangle patient 3 times a day  - Stand patient 3 times a day  - Ambulate patient 3 times a day  - Out of bed to chair 3 times a day   - Out of bed for meals 3 times a day  - Out of bed for toileting  - Record patient progress and toleration of activity level   Outcome: Progressing     Problem: DISCHARGE PLANNING  Goal: Discharge to home or other facility  with appropriate resources  Description: INTERVENTIONS:  - Identify barriers to discharge w/patient and caregiver  - Arrange for needed discharge resources and transportation as appropriate  - Identify discharge learning needs (meds, wound care, etc.)  - Arrange for interpretive services to assist at discharge as needed  - Refer to Case Management Department for coordinating discharge planning if the patient needs post-hospital services based on physician/advanced practitioner order or complex needs related to functional status, cognitive ability, or social support system  Outcome: Progressing     Problem: Knowledge Deficit  Goal: Patient/family/caregiver demonstrates understanding of disease process, treatment plan, medications, and discharge instructions  Description: Complete learning assessment and assess knowledge base.  Interventions:  - Provide teaching at level of understanding  - Provide teaching via preferred learning methods  Outcome: Progressing     Problem: Prexisting or High Potential for Compromised Skin Integrity  Goal: Skin integrity is maintained or improved  Description: INTERVENTIONS:  - Identify patients at risk for skin breakdown  - Assess and monitor skin integrity  - Assess and monitor nutrition and hydration status  - Monitor labs   - Assess for incontinence   - Turn and reposition patient  - Assist with mobility/ambulation  - Relieve pressure over bony prominences  - Avoid friction and shearing  - Provide appropriate hygiene as needed including keeping skin clean and dry  - Evaluate need for skin moisturizer/barrier cream  - Collaborate with interdisciplinary team   - Patient/family teaching  - Consider wound care consult   Outcome: Progressing     Problem: GASTROINTESTINAL - ADULT  Goal: Maintains or returns to baseline bowel function  Description: INTERVENTIONS:  - Assess bowel function  - Encourage oral fluids to ensure adequate hydration  - Administer IV fluids if ordered to ensure  adequate hydration  - Administer ordered medications as needed  - Encourage mobilization and activity  - Consider nutritional services referral to assist patient with adequate nutrition and appropriate food choices  Outcome: Progressing     Problem: Nutrition/Hydration-ADULT  Goal: Nutrient/Hydration intake appropriate for improving, restoring or maintaining nutritional needs  Description: Monitor and assess patient's nutrition/hydration status for malnutrition. Collaborate with interdisciplinary team and initiate plan and interventions as ordered.  Monitor patient's weight and dietary intake as ordered or per policy. Utilize nutrition screening tool and intervene as necessary. Determine patient's food preferences and provide high-protein, high-caloric foods as appropriate.     INTERVENTIONS:  - Monitor oral intake, urinary output, labs, and treatment plans  - Assess nutrition and hydration status and recommend course of action  - Evaluate amount of meals eaten  - Assist patient with eating if necessary   - Allow adequate time for meals  - Recommend/ encourage appropriate diets, oral nutritional supplements, and vitamin/mineral supplements  - Order, calculate, and assess calorie counts as needed  - Recommend, monitor, and adjust tube feedings and TPN/PPN based on assessed needs  - Assess need for intravenous fluids  - Provide specific nutrition/hydration education as appropriate  - Include patient/family/caregiver in decisions related to nutrition  Outcome: Progressing

## 2025-02-15 NOTE — ASSESSMENT & PLAN NOTE
Likely due to prerenal azotemia and rhabdomyolysis, discontinue IV fluid  Suspected component of ATN, appreciate nephrology input    Creatinine continues to trend down

## 2025-02-15 NOTE — ASSESSMENT & PLAN NOTE
Etiology is attributed to acute tubular necrosis from volume depletion and intrinsic causes from rhabdomyolysis. There has been improvement in the patient's kidney function.  At its peak the creatinine was as high as 4.7 during this admission but has improved to 3.79 mg/dL today, 2/15.  UOP 2.75L on 2/14. Concern for possible development of diuretic ATN.  Need to follow hemodynamics and fluid status closely as patient is still having significant diarrhea for which the patient required a rectal tube.    The patient had been on 2 different IV fluids due to volume deficit, diarrhea, rhabdomyolysis and bicarb deficit.  IV fluids discontinued by primary team with concern for volume overload.  Bicarbonate stable at 25 millimoles per liter, 2/15.Continue to follow hemodynamics and bicarbonate levels especially as the patient is still having significant diarrheal output.  If patient develops worsening bicarbonate level can readd IV bicarbonate infusion.

## 2025-02-15 NOTE — PROGRESS NOTES
Progress Note - Nephrology   Name: Emiliano Rodriguez 49 y.o. male I MRN: [id number]  Unit/Bed#: 404-01 I Date of Admission: 2/7/2025   Date of Service: 2/15/2025 I Hospital Day: 8    Assessment & Plan  Acute renal failure (ARF) (HCC)  Etiology is attributed to acute tubular necrosis from volume depletion and intrinsic causes from rhabdomyolysis. There has been improvement in the patient's kidney function.  At its peak the creatinine was as high as 4.7 during this admission but has improved to 3.79 mg/dL today, 2/15.  UOP 2.75L on 2/14. Concern for possible development of diuretic ATN.  Need to follow hemodynamics and fluid status closely as patient is still having significant diarrhea for which the patient required a rectal tube.    The patient had been on 2 different IV fluids due to volume deficit, diarrhea, rhabdomyolysis and bicarb deficit.  IV fluids discontinued by primary team with concern for volume overload.  Bicarbonate stable at 25 millimoles per liter, 2/15.Continue to follow hemodynamics and bicarbonate levels especially as the patient is still having significant diarrheal output.  If patient develops worsening bicarbonate level can readd IV bicarbonate infusion.    Acute tubular necrosis (HCC)  Continue supportive care at this time; the patient may be developing nonoliguric ATN.  Hypokalemia  Potassium is 3.7 on February 15 today with a magnesium of 1.8.  Will replace potassium and adjust maintenance dosing of it has not been done already.  Will also replace magnesium.  Regarding other electrolytes phosphorus has improved to 3.5.  Volume depletion  Has improved with concern for possible volume overload.  IV fluids discontinued at this time per primary team.  Continue monitoring volume status and reinitiate IV fluids if indicated.  Diarrhea  Continue with diarrhea management per gastroenterology; appreciate input.  Hypomagnesemia  Magnesium stable at 1.8 mg/dL on 2/15.  S/p repletion with magnesium 2 g  IV x 1 on 2/14.  Will give magnesium 2 g IV x 2 doses today.  Recheck a.m. labs.  Hypophosphatemia  This has improved status post replacement.  Phosphorus 3.5 mg/dL, 2/15.  Follow daily levels.  Rhabdomyolysis  CK decreased from as high as 18,000 on February 10 to 2138 on February 15.  Continue to trend levels.  Gastroesophageal reflux disease without esophagitis  Continue with PPI, management per GI.  Alcohol abuse  Continue [facility] protocol, further care and management according to hospitalist recommendations.  Abnormal LFTs  Potentially related to alcohol intake, although increased AST can also be secondary to rhabdomyolysis.  ALT levels have improved from admission as have AST levels.  Patient's bilirubin levels have also improved.  Appreciate GI input.  Weakness of left lower extremity  Strength is improving, able to lift LLE off bed today.    I have reviewed the nephrology recommendations including creatinine trends, IV fluid and electrolytes with hospitalist we are in agreement with renal plan including the information outlined above.     Subjective   Brief History of Admission - 49 y.o. year old male with a past medical history of hypertension, CVA, chronic diastolic congestive heart failure, alcohol use, GERD, tobacco use who was admitted to [facility] 2/17 after presenting with weakness to bilateral lower extremities.  Nephrology is following for GUNJAN, volume management and electrolyte disturbances.      Patient seen and examined resting in bed. States he is feeling somewhat better and appetite is improving. States diarrhea is improved today.       Objective :  Temp:  [98.1 °F (36.7 °C)-98.4 °F (36.9 °C)] 98.4 °F (36.9 °C)  HR:  [] 102  BP: (136)/(94) 136/94  Resp:  [14-17] 14  SpO2:  [95 %-96 %] 95 %    Current Weight: Weight - Scale: 100 kg (220 lb 14.4 oz)  First Weight: Weight - Scale: 90.3 kg (199 lb 1.2 oz)  I/O         02/13 0701  02/14 0700 02/14 0701  02/15 0700 02/15 0701 02/16 0700    P.O. 1260 560  240    I.V. (mL/kg)  8126.7 (81.3) 2291.7 (22.9)    Total Intake(mL/kg) 1260 (12.6) 8686.7 (86.9) 2531.7 (25.3)    Urine (mL/kg/hr) 3375 (1.4) 2750 (1.1) 700 (1.8)    Stool 0 0 0    Total Output 3375 2750 700    Net -2115 +5936.7 +1831.7           Unmeasured Stool Occurrence 14 x 7 x 5 x          Physical Exam  Vitals and nursing note reviewed.   Constitutional:       General: He is not in acute distress.     Appearance: He is well-developed. He is obese. He is ill-appearing.   HENT:      Head: Normocephalic and atraumatic.      Right Ear: External ear normal.      Left Ear: External ear normal.      Nose: Nose normal.      Mouth/Throat:      Mouth: Mucous membranes are moist.      Pharynx: Oropharynx is clear.   Eyes:      Extraocular Movements: Extraocular movements intact.      Conjunctiva/sclera: Conjunctivae normal.      Pupils: Pupils are equal, round, and reactive to light.   Cardiovascular:      Rate and Rhythm: Normal rate and regular rhythm.      Heart sounds: Normal heart sounds. No murmur heard.  Pulmonary:      Effort: Pulmonary effort is normal. No respiratory distress.      Breath sounds: Normal breath sounds.      Comments: Decreased breath sounds  Abdominal:      Palpations: Abdomen is soft.      Tenderness: There is no abdominal tenderness.   Musculoskeletal:         General: No swelling.      Cervical back: Neck supple.      Right lower leg: Edema present.      Left lower leg: Edema present.      Comments: 2+ BLE edema   Skin:     General: Skin is warm and dry.      Capillary Refill: Capillary refill takes less than 2 seconds.   Neurological:      General: No focal deficit present.      Mental Status: He is alert and oriented to person, place, and time.   Psychiatric:         Mood and Affect: Mood normal.         Behavior: Behavior normal.       Medications:    Current Facility-Administered Medications:     acetaminophen (TYLENOL) tablet 650 mg, 650 mg, Oral, Q6H PRN, Prudencio Nix MD,  650 mg at 02/10/25 1126    clopidogrel (PLAVIX) tablet 75 mg, 75 mg, Oral, Daily, Ankita Katz MD, 75 mg at 02/15/25 0927    folic acid (FOLVITE) tablet 1 mg, 1 mg, Oral, Daily, Ankita Katz MD, 1 mg at 02/15/25 0927    heparin (porcine) subcutaneous injection 5,000 Units, 5,000 Units, Subcutaneous, Q8H DAVION, 5,000 Units at 02/15/25 0537 **AND** [COMPLETED] Platelet count, , , Once, Ankita Katz MD    loperamide (IMODIUM) capsule 4 mg, 4 mg, Oral, TID, Martha Gagnon PA-C, 4 mg at 02/15/25 0927    metoprolol succinate (TOPROL-XL) 24 hr tablet 25 mg, 25 mg, Oral, Daily, Ankita Katz MD, 25 mg at 02/15/25 0927    moisture barrier miconazole 2% cream (aka MELODY MOISTURE BARRIER ANTIFUNGAL CREAM), , Topical, BID, Isaías Eller DO, 1 Application at 02/15/25 0928    nicotine (NICODERM CQ) 21 mg/24 hr TD 24 hr patch 1 patch, 1 patch, Transdermal, Daily, Ankita Katz MD, 1 patch at 02/15/25 0928    pantoprazole (PROTONIX) EC tablet 40 mg, 40 mg, Oral, Early Morning, Ankita Katz MD, 40 mg at 02/15/25 0537    saccharomyces boulardii (FLORASTOR) capsule 250 mg, 250 mg, Oral, BID, Ankita Katz MD, 250 mg at 02/15/25 0928    simethicone (MYLICON) chewable tablet 80 mg, 80 mg, Oral, Q6H PRN, Isaías Eller DO, 80 mg at 02/15/25 0356    traZODone (DESYREL) tablet 50 mg, 50 mg, Oral, HS PRN, Garcia Escobar MD, 50 mg at 02/14/25 0018      Lab Results: I have reviewed the following results:  Results from last 7 days   Lab Units 02/15/25  0520 02/14/25  1848 02/14/25  0509 02/13/25  0800 02/12/25  0541 02/11/25  0420 02/10/25  0406 02/09/25  0546   WBC Thousand/uL  --   --  8.48  --  8.89 8.37 9.56 8.39   HEMOGLOBIN g/dL  --   --  9.4*  --  9.8* 9.6* 10.0* 10.6*   HEMATOCRIT %  --   --  29.1*  --  30.0* 29.2* 31.4* 33.2*   PLATELETS Thousands/uL  --   --  184  --  134* 128* 119* 118*   POTASSIUM mmol/L 3.7 4.0 3.3* 3.6 3.4* 3.2* 3.4* 4.0   CHLORIDE mmol/L 108 108 107 106 106 106 107 110*   CO2 mmol/L 25 23  "24 23 23 20* 16* 15*   BUN mg/dL 22 23 25 29* 33* 36* 34* 29*   CREATININE mg/dL 3.79* 3.93* 3.95* 4.20* 4.44* 4.64* 4.78* 4.66*   CALCIUM mg/dL 7.3* 7.3* 7.3* 7.4* 7.3* 7.2* 7.2* 7.6*   MAGNESIUM mg/dL 1.8*  --  1.8* 1.7* 1.8* 1.5*  --  1.6*   PHOSPHORUS mg/dL 3.5  --  3.4 2.5* 1.8*  --   --  3.0   ALBUMIN g/dL  --  2.3* 2.2*  --  2.2* 2.0* 2.0* 2.1*       Administrative Statements     Portions of the record may have been created with voice recognition software. Occasional wrong word or \"sound a like\" substitutions may have occurred due to the inherent limitations of voice recognition software. Read the chart carefully and recognize, using context, where substitutions have occurred.If you have any questions, please contact the dictating provider.  "

## 2025-02-15 NOTE — ASSESSMENT & PLAN NOTE
Has improved with concern for possible volume overload.  IV fluids discontinued at this time per primary team.  Continue monitoring volume status and reinitiate IV fluids if indicated.

## 2025-02-15 NOTE — ASSESSMENT & PLAN NOTE
CK level improving    Patient with significant bilateral lower extremity edema, is becoming uncomfortable, discontinue IV fluid and trend CPK with p.o. intake.

## 2025-02-15 NOTE — PROGRESS NOTES
Progress Note - Hospitalist   Name: Emiliano Rodriguez 49 y.o. male I MRN: [id number]  Unit/Bed#: 404-01 I Date of Admission: 2/7/2025   Date of Service: 2/15/2025 I Hospital Day: 8    Assessment & Plan  Rhabdomyolysis  CK level improving    Patient with significant bilateral lower extremity edema, is becoming uncomfortable, discontinue IV fluid and trend CPK with p.o. intake.      Acute renal failure (ARF) (HCC)  Likely due to prerenal azotemia and rhabdomyolysis, discontinue IV fluid  Suspected component of ATN, appreciate nephrology input    Creatinine continues to trend down  Hyponatremia (Resolved: 2/15/2025)  Sodium level is 139 today/within normal limits  Acute diverticulitis (Resolved: 2/15/2025)  Patient completed 5 days of antibiotics, will discontinue antibiotics and monitor symptoms.  Plan for outpatient colonoscopy in 6 to 8 weeks    Diarrhea  Rectal tube removed, continue to monitor stool output, seems improved.    Medical management per GI  Weakness of left lower extremity  MRI negative for acute CVA, muscle strength seems to be improving, continue IV thiamine.  Abnormal LFTs  Patient with hyperbilirbuinemia and elevated LFT in the setting of rhabdomyolysis and ETOH use    Likely multifactorial including alcohol use, fatty liver disease, rhabdomyolysis.    Continues to trend down    Gastroesophageal reflux disease without esophagitis  Protonix 40 mg daily    Outpatient EGD per GI  Alcohol abuse  Endorses at least 5 beers a day, although I suspect this is understated  Folic acid, thiamine  Patient did not exhibit any signs of acute alcohol withdrawal, CIWA protocol was not needed, it has been discontinued.  Hypokalemia  Monitor potassium level daily  Volume depletion  Continue IV fluid, encourage p.o. intake  Acute tubular necrosis (HCC)  Suspected component of ATN, nephrology consult appreciated.  Hypomagnesemia  IV magnesium ordered today  Hypophosphatemia  Phosphorus level normal today      Code  Status: Level 1 - Full Code    Subjective   No pain    Objective :  Temp:  [98.1 °F (36.7 °C)-98.4 °F (36.9 °C)] 98.4 °F (36.9 °C)  HR:  [] 102  BP: (136)/(94) 136/94  Resp:  [14-17] 14  SpO2:  [95 %-96 %] 95 %    Body mass index is 35.65 kg/m².     Input and Output Summary (last 24 hours):     Intake/Output Summary (Last 24 hours) at 2/15/2025 0937  Last data filed at 2/15/2025 0934  Gross per 24 hour   Intake 80230.34 ml   Output 2700 ml   Net 8358.34 ml       Physical Exam  Vitals and nursing note reviewed.   HENT:      Head: Normocephalic and atraumatic.      Right Ear: External ear normal.      Left Ear: External ear normal.   Cardiovascular:      Rate and Rhythm: Normal rate.   Pulmonary:      Effort: Pulmonary effort is normal.   Abdominal:      General: Bowel sounds are normal.   Musculoskeletal:      Cervical back: Normal range of motion.      Right lower leg: Edema present.      Left lower leg: Edema present.      Comments: +3 edema present bilateral lower extremities to the hips   Neurological:      Mental Status: He is alert and oriented to person, place, and time. Mental status is at baseline.      Cranial Nerves: No cranial nerve deficit.      Motor: No weakness.      Coordination: Coordination normal.      Gait: Gait normal.   Psychiatric:         Mood and Affect: Mood normal.         Behavior: Behavior normal.         Thought Content: Thought content normal.         Judgment: Judgment normal.           Lines/Drains:              Lab Results: I have reviewed the following results:   Results from last 7 days   Lab Units 02/14/25  0509   WBC Thousand/uL 8.48   HEMOGLOBIN g/dL 9.4*   HEMATOCRIT % 29.1*   PLATELETS Thousands/uL 184   SEGS PCT % 73   LYMPHO PCT % 14   MONO PCT % 9   EOS PCT % 2     Results from last 7 days   Lab Units 02/15/25  0520 02/14/25  1848   SODIUM mmol/L 141 139   POTASSIUM mmol/L 3.7 4.0   CHLORIDE mmol/L 108 108   CO2 mmol/L 25 23   BUN mg/dL 22 23   CREATININE mg/dL 3.79*  3.93*   ANION GAP mmol/L 8 8   CALCIUM mg/dL 7.3* 7.3*   ALBUMIN g/dL  --  2.3*   TOTAL BILIRUBIN mg/dL  --  1.49*   ALK PHOS U/L  --  124*   ALT U/L  --  201*   AST U/L  --  208*   GLUCOSE RANDOM mg/dL 88 108     Results from last 7 days   Lab Units 02/12/25  0541   INR  1.28*                   Recent Cultures (last 7 days):         Imaging Results Review: No pertinent imaging studies reviewed.  Other Study Results Review: No additional pertinent studies reviewed.    Last 24 Hours Medication List:     Current Facility-Administered Medications:     acetaminophen (TYLENOL) tablet 650 mg, Q6H PRN    clopidogrel (PLAVIX) tablet 75 mg, Daily    folic acid (FOLVITE) tablet 1 mg, Daily    heparin (porcine) subcutaneous injection 5,000 Units, Q8H DAVION **AND** [COMPLETED] Platelet count, Once    loperamide (IMODIUM) capsule 4 mg, TID    metoprolol succinate (TOPROL-XL) 24 hr tablet 25 mg, Daily    moisture barrier miconazole 2% cream (aka MELODY MOISTURE BARRIER ANTIFUNGAL CREAM), BID    nicotine (NICODERM CQ) 21 mg/24 hr TD 24 hr patch 1 patch, Daily    pantoprazole (PROTONIX) EC tablet 40 mg, Early Morning    saccharomyces boulardii (FLORASTOR) capsule 250 mg, BID    simethicone (MYLICON) chewable tablet 80 mg, Q6H PRN    traZODone (DESYREL) tablet 50 mg, HS PRN    Administrative Statements   Today, Patient Was Seen By: Isaías Eller DO      **Please Note: This note may have been constructed using a voice recognition system.**

## 2025-02-15 NOTE — ASSESSMENT & PLAN NOTE
Magnesium stable at 1.8 mg/dL on 2/15.  S/p repletion with magnesium 2 g IV x 1 on 2/14.  Will give magnesium 2 g IV x 2 doses today.  Recheck a.m. labs.

## 2025-02-15 NOTE — ASSESSMENT & PLAN NOTE
Continue IV fluid, encourage p.o. intake   Thank you for coming to see me today.    [place] paperwork completed today, scanned into your chart and original returned to you.

## 2025-02-15 NOTE — ASSESSMENT & PLAN NOTE
CK decreased from as high as 18,000 on February 10 to 2138 on February 15.  Continue to trend levels.

## 2025-02-16 LAB
ALBUMIN SERPL BCG-MCNC: 2.4 G/DL (ref 3.5–5)
ALP SERPL-CCNC: 115 U/L (ref 34–104)
ALT SERPL W P-5'-P-CCNC: 162 U/L (ref 7–52)
ANION GAP SERPL CALCULATED.3IONS-SCNC: 7 MMOL/L (ref 4–13)
AST SERPL W P-5'-P-CCNC: 135 U/L (ref 13–39)
BASOPHILS # BLD AUTO: 0.04 THOUSANDS/ΜL (ref 0–0.1)
BASOPHILS NFR BLD AUTO: 1 % (ref 0–1)
BILIRUB SERPL-MCNC: 1.48 MG/DL (ref 0.2–1)
BUN SERPL-MCNC: 18 MG/DL (ref 5–25)
CALCIUM ALBUM COR SERPL-MCNC: 8.7 MG/DL (ref 8.3–10.1)
CALCIUM SERPL-MCNC: 7.4 MG/DL (ref 8.4–10.2)
CHLORIDE SERPL-SCNC: 106 MMOL/L (ref 96–108)
CK SERPL-CCNC: 1399 U/L (ref 39–308)
CO2 SERPL-SCNC: 24 MMOL/L (ref 21–32)
CREAT SERPL-MCNC: 3.57 MG/DL (ref 0.6–1.3)
EOSINOPHIL # BLD AUTO: 0.13 THOUSAND/ΜL (ref 0–0.61)
EOSINOPHIL NFR BLD AUTO: 2 % (ref 0–6)
ERYTHROCYTE [DISTWIDTH] IN BLOOD BY AUTOMATED COUNT: 15.1 % (ref 11.6–15.1)
GFR SERPL CREATININE-BSD FRML MDRD: 18 ML/MIN/1.73SQ M
GLUCOSE SERPL-MCNC: 103 MG/DL (ref 65–140)
HCT VFR BLD AUTO: 28.3 % (ref 36.5–49.3)
HGB BLD-MCNC: 9 G/DL (ref 12–17)
IMM GRANULOCYTES # BLD AUTO: 0.06 THOUSAND/UL (ref 0–0.2)
IMM GRANULOCYTES NFR BLD AUTO: 1 % (ref 0–2)
INR PPP: 1.09 (ref 0.85–1.19)
LYMPHOCYTES # BLD AUTO: 1.05 THOUSANDS/ΜL (ref 0.6–4.47)
LYMPHOCYTES NFR BLD AUTO: 13 % (ref 14–44)
MAGNESIUM SERPL-MCNC: 2.1 MG/DL (ref 1.9–2.7)
MCH RBC QN AUTO: 33.2 PG (ref 26.8–34.3)
MCHC RBC AUTO-ENTMCNC: 31.8 G/DL (ref 31.4–37.4)
MCV RBC AUTO: 104 FL (ref 82–98)
MONOCYTES # BLD AUTO: 0.69 THOUSAND/ΜL (ref 0.17–1.22)
MONOCYTES NFR BLD AUTO: 9 % (ref 4–12)
NEUTROPHILS # BLD AUTO: 5.91 THOUSANDS/ΜL (ref 1.85–7.62)
NEUTS SEG NFR BLD AUTO: 74 % (ref 43–75)
NRBC BLD AUTO-RTO: 0 /100 WBCS
PHOSPHATE SERPL-MCNC: 3.4 MG/DL (ref 2.7–4.5)
PLATELET # BLD AUTO: 212 THOUSANDS/UL (ref 149–390)
PMV BLD AUTO: 10.8 FL (ref 8.9–12.7)
POTASSIUM SERPL-SCNC: 3.8 MMOL/L (ref 3.5–5.3)
PROT SERPL-MCNC: 5.5 G/DL (ref 6.4–8.4)
PROTHROMBIN TIME: 14.6 SECONDS (ref 12.3–15)
RBC # BLD AUTO: 2.71 MILLION/UL (ref 3.88–5.62)
SODIUM SERPL-SCNC: 137 MMOL/L (ref 135–147)
WBC # BLD AUTO: 7.88 THOUSAND/UL (ref 4.31–10.16)

## 2025-02-16 PROCEDURE — 80053 COMPREHEN METABOLIC PANEL: CPT | Performed by: INTERNAL MEDICINE

## 2025-02-16 PROCEDURE — 83735 ASSAY OF MAGNESIUM: CPT | Performed by: INTERNAL MEDICINE

## 2025-02-16 PROCEDURE — 85610 PROTHROMBIN TIME: CPT | Performed by: INTERNAL MEDICINE

## 2025-02-16 PROCEDURE — 85025 COMPLETE CBC W/AUTO DIFF WBC: CPT | Performed by: INTERNAL MEDICINE

## 2025-02-16 PROCEDURE — 82550 ASSAY OF CK (CPK): CPT | Performed by: INTERNAL MEDICINE

## 2025-02-16 PROCEDURE — 99233 SBSQ HOSP IP/OBS HIGH 50: CPT

## 2025-02-16 PROCEDURE — 84100 ASSAY OF PHOSPHORUS: CPT | Performed by: INTERNAL MEDICINE

## 2025-02-16 PROCEDURE — 99232 SBSQ HOSP IP/OBS MODERATE 35: CPT | Performed by: INTERNAL MEDICINE

## 2025-02-16 RX ORDER — LOPERAMIDE HYDROCHLORIDE 2 MG/1
4 CAPSULE ORAL 4 TIMES DAILY
Status: DISCONTINUED | OUTPATIENT
Start: 2025-02-16 | End: 2025-02-19

## 2025-02-16 RX ORDER — ALBUMIN (HUMAN) 12.5 G/50ML
12.5 SOLUTION INTRAVENOUS EVERY 8 HOURS SCHEDULED
Status: COMPLETED | OUTPATIENT
Start: 2025-02-16 | End: 2025-02-17

## 2025-02-16 RX ADMIN — MICONAZOLE NITRATE 1 APPLICATION: 20 CREAM TOPICAL at 09:03

## 2025-02-16 RX ADMIN — CLOPIDOGREL 75 MG: 75 TABLET ORAL at 09:02

## 2025-02-16 RX ADMIN — FOLIC ACID 1 MG: 1 TABLET ORAL at 09:02

## 2025-02-16 RX ADMIN — MICONAZOLE NITRATE 1 APPLICATION: 20 CREAM TOPICAL at 16:40

## 2025-02-16 RX ADMIN — PANTOPRAZOLE SODIUM 40 MG: 40 TABLET, DELAYED RELEASE ORAL at 06:01

## 2025-02-16 RX ADMIN — ALBUMIN (HUMAN) 12.5 G: 0.25 INJECTION, SOLUTION INTRAVENOUS at 21:32

## 2025-02-16 RX ADMIN — ALBUMIN (HUMAN) 12.5 G: 0.25 INJECTION, SOLUTION INTRAVENOUS at 12:13

## 2025-02-16 RX ADMIN — HEPARIN SODIUM 5000 UNITS: 5000 INJECTION, SOLUTION INTRAVENOUS; SUBCUTANEOUS at 06:01

## 2025-02-16 RX ADMIN — Medication 250 MG: at 16:40

## 2025-02-16 RX ADMIN — HEPARIN SODIUM 5000 UNITS: 5000 INJECTION, SOLUTION INTRAVENOUS; SUBCUTANEOUS at 21:31

## 2025-02-16 RX ADMIN — Medication 250 MG: at 09:03

## 2025-02-16 RX ADMIN — LOPERAMIDE HYDROCHLORIDE 4 MG: 2 CAPSULE ORAL at 16:40

## 2025-02-16 RX ADMIN — TRAZODONE HYDROCHLORIDE 50 MG: 50 TABLET ORAL at 23:44

## 2025-02-16 RX ADMIN — METOPROLOL SUCCINATE 25 MG: 25 TABLET, EXTENDED RELEASE ORAL at 09:02

## 2025-02-16 RX ADMIN — HEPARIN SODIUM 5000 UNITS: 5000 INJECTION, SOLUTION INTRAVENOUS; SUBCUTANEOUS at 13:39

## 2025-02-16 RX ADMIN — POTASSIUM CHLORIDE 40 MEQ: 1500 TABLET, EXTENDED RELEASE ORAL at 09:03

## 2025-02-16 RX ADMIN — NICOTINE 1 PATCH: 21 PATCH, EXTENDED RELEASE TRANSDERMAL at 09:03

## 2025-02-16 RX ADMIN — LOPERAMIDE HYDROCHLORIDE 4 MG: 2 CAPSULE ORAL at 09:02

## 2025-02-16 RX ADMIN — LOPERAMIDE HYDROCHLORIDE 4 MG: 2 CAPSULE ORAL at 21:31

## 2025-02-16 RX ADMIN — SIMETHICONE 80 MG: 80 TABLET, CHEWABLE ORAL at 21:31

## 2025-02-16 NOTE — ASSESSMENT & PLAN NOTE
CK level improving off of IV fluid, IV albumin ordered today by nephrology    Patient with significant bilateral lower extremity edema, is becoming uncomfortable, slight decrease today and lower extremity edema.

## 2025-02-16 NOTE — PROGRESS NOTES
Progress Note - Nephrology   Name: Emiliano Rodriguez 49 y.o. male I MRN: [id number]  Unit/Bed#: 404-01 I Date of Admission: 2/7/2025   Date of Service: 2/16/2025 I Hospital Day: 9    Assessment & Plan  Acute renal failure (ARF) (HCC)  Etiology is attributed to acute tubular necrosis from volume depletion and intrinsic causes from rhabdomyolysis. There has been improvement in the patient's kidney function.  At its peak the creatinine was as high as 4.7 during this admission but has improved to 3.57 mg/dL today, 2/16.  UOP 2.75L on 2/14. Concern for possible development of diuretic ATN.  5 L UOP on 2/15.  Patient noted to have new onset 3+ BLE edema.  Need to follow hemodynamics and fluid status closely as patient is still having significant diarrhea.    The patient had been on 2 different IV fluids due to volume deficit, diarrhea, rhabdomyolysis and bicarb deficit.  IV fluids discontinued by primary team 2/15 with concern for volume overload.  Bicarbonate stable at 24 mmol/L, 2/16.  Consider reassessing bicarb infusion if bicarb level drop.  Albumin 2.4 g/dL, will provide albumin every 8 hours x 3 doses to provide colloid replacement and assist with fluid mobilization.    Acute tubular necrosis (HCC)  Continue supportive care at this time; the patient may be developing nonoliguric ATN.  5L UOP on 2/16.    Hypokalemia  Potassium is 3.8 today, 2/16.  Continue potassium chloride 40 mEq twice daily.  Volume depletion  Has improved with concern for possible volume overload.  Agree with discontinuing IV fluids 2/15 due to increasing BLE edema.  Blood pressure stable, -150 past 24 hours on 2/16.  As noted above, albumin is decreased,  5 L urine output/24-hour and significant BLE edema.  Will provide albumin as colloid replacement to assist with fluid mobilization.  Diarrhea  Continue with diarrhea management per gastroenterology; appreciate input.  Hypomagnesemia  Magnesium stable at 2.1 mg/dL on 2/16.  Continue to  monitor and replete as indicated.  Hypophosphatemia  Improved status post replacement.  Phosphorus 3.4 mg/dL, 2/16.  Follow daily levels.  Rhabdomyolysis  CK decreased from as high as 18,000 on February 10 to 1399 on February 16.  Continue to trend levels.  Gastroesophageal reflux disease without esophagitis  Continue with PPI, management per GI.  Alcohol abuse  Continue [facility] protocol, further care and management according to hospitalist recommendations.  Abnormal LFTs  Potentially related to alcohol intake, although increased AST can also be secondary to rhabdomyolysis.  ALT levels have improved from admission as have AST levels.  Patient's bilirubin levels have also improved.  Appreciate GI input.  Weakness of left lower extremity  Strength is improving, able to lift LLE off bed today.    I have reviewed the nephrology recommendations including volume status and creatinine trends, with hospitalist, and we are in agreement with renal plan including the information outlined above.     Subjective   Brief History of Admission - 49 y.o. year old male with a past medical history of hypertension, CVA, chronic diastolic congestive heart failure, alcohol use, GERD, tobacco use who was admitted to [facility] 2/17 after presenting with weakness to bilateral lower extremities.  Nephrology is following for GUNJAN, volume management and electrolyte disturbances.     Patient seen and examined via virtual visit resting in bed.  States he feels tired today and diarrhea increased again last night.  States he ate breakfast well this morning.  Also notes he had increased amount urine output yesterday.  Discussed benefits of albumin in mobilizing fluid and possible need to reinitiate gentle IV fluids if indicated based on urine output and diarrhea losses.    Objective :  Temp:  [98 °F (36.7 °C)-98.2 °F (36.8 °C)] 98 °F (36.7 °C)  HR:  [85-91] 88  BP: (129-150)/(90-96) 129/94  Resp:  [15-16] 15  SpO2:  [95 %-98 %] 95 %  O2 Device: None (Room  air)    Current Weight: Weight - Scale: 104 kg (229 lb 0.9 oz)  First Weight: Weight - Scale: 90.3 kg (199 lb 1.2 oz)  I/O         02/14 0701  02/15 0700 02/15 0701 02/16 0700 02/16 0701 02/17 0700    P.O. 560 1780 540    I.V. (mL/kg) 8126.7 (81.3) 2451.7 (23.6)     Total Intake(mL/kg) 8686.7 (86.9) 4231.7 (40.7) 540 (5.2)    Urine (mL/kg/hr) 2750 (1.1) 5050 (2) 315 (1.5)    Stool 0 0 0    Total Output 2750 5050 315    Net +5936.7 -818.3 +225           Unmeasured Stool Occurrence 7 x 17 x 1 x            Administrative Statements   VIRTUAL CARE DOCUMENTATION:     1. This service was provided via Telemedicine using Teams Virtual Rounding      2. Parties in the room with patient during teleconsult Patient only    3. Confidentiality My office door was closed     4. Participants No one else was in the room    5. Patient acknowledged consent and understanding of privacy and security of the  Telemedicine consult. I informed the patient that I have reviewed their record in Epic and presented the opportunity for them to ask any questions regarding the visit today.  The patient agreed to participate.    6. I have spent a total time of 30 minutes in caring for this patient on the day of the visit/encounter including Diagnostic results, Risks and benefits of tx options, Instructions for management, Patient and family education, Risk factor reductions, Counseling / Coordination of care, Documenting in the medical record, Reviewing / ordering tests, medicine, procedures  , Obtaining or reviewing history  , and Communicating with other healthcare professionals , not including the time spent for establishing the audio/video connection.      Physical Exam  Vitals and nursing note reviewed.   Constitutional:       General: He is not in acute distress.     Appearance: He is well-developed. He is obese. He is ill-appearing.   HENT:      Head: Normocephalic and atraumatic.      Right Ear: External ear normal.      Left Ear: External  ear normal.      Nose: Nose normal.      Mouth/Throat:      Mouth: Mucous membranes are moist.      Pharynx: Oropharynx is clear.   Eyes:      Extraocular Movements: Extraocular movements intact.      Conjunctiva/sclera: Conjunctivae normal.      Pupils: Pupils are equal, round, and reactive to light.   Cardiovascular:      Comments: Unable to assess as virtual visit  Pulmonary:      Effort: Pulmonary effort is normal. No respiratory distress.      Comments: Unable to assess as virtual visit, RN states clear lung sounds  Abdominal:      Comments: Unable to assess as virtual visit.  Patient states diarrhea resumed last night, ate breakfast this morning with no issues.   Musculoskeletal:         General: No swelling.      Cervical back: Neck supple.      Right lower leg: Edema present.      Left lower leg: Edema present.      Comments: Unable to assess as virtual visit, RN states significant bilateral lower extremity edema remains.   Skin:     Capillary Refill: Capillary refill takes less than 2 seconds.      Comments: Unable to assess as virtual visit   Neurological:      General: No focal deficit present.      Mental Status: He is alert and oriented to person, place, and time.   Psychiatric:         Mood and Affect: Mood normal.         Behavior: Behavior normal.       Medications:    Current Facility-Administered Medications:     acetaminophen (TYLENOL) tablet 650 mg, 650 mg, Oral, Q6H PRN, Prudencio Nix MD, 650 mg at 02/10/25 1126    clopidogrel (PLAVIX) tablet 75 mg, 75 mg, Oral, Daily, Ankita Katz MD, 75 mg at 02/16/25 0902    folic acid (FOLVITE) tablet 1 mg, 1 mg, Oral, Daily, Ankita Katz MD, 1 mg at 02/16/25 0902    heparin (porcine) subcutaneous injection 5,000 Units, 5,000 Units, Subcutaneous, Q8H DAVION, 5,000 Units at 02/16/25 0601 **AND** [COMPLETED] Platelet count, , , Once, Ankita Katz MD    loperamide (IMODIUM) capsule 4 mg, 4 mg, Oral, TID, Martha Gagnon PA-C, 4 mg at 02/16/25  "0902    metoprolol succinate (TOPROL-XL) 24 hr tablet 25 mg, 25 mg, Oral, Daily, Ankita Katz MD, 25 mg at 02/16/25 0902    moisture barrier miconazole 2% cream (aka MELODY MOISTURE BARRIER ANTIFUNGAL CREAM), , Topical, BID, Isaías Eller DO, 1 Application at 02/16/25 0903    nicotine (NICODERM CQ) 21 mg/24 hr TD 24 hr patch 1 patch, 1 patch, Transdermal, Daily, Ankita Katz MD, 1 patch at 02/16/25 0903    pantoprazole (PROTONIX) EC tablet 40 mg, 40 mg, Oral, Early Morning, Ankita Katz MD, 40 mg at 02/16/25 0601    saccharomyces boulardii (FLORASTOR) capsule 250 mg, 250 mg, Oral, BID, Ankita Katz MD, 250 mg at 02/16/25 0903    simethicone (MYLICON) chewable tablet 80 mg, 80 mg, Oral, Q6H PRN, Isaías Eller DO, 80 mg at 02/15/25 2219    traZODone (DESYREL) tablet 50 mg, 50 mg, Oral, HS PRN, Garcia Escobar MD, 50 mg at 02/15/25 2352      Lab Results: I have reviewed the following results:  Results from last 7 days   Lab Units 02/16/25  0434 02/15/25  0520 02/14/25  1848 02/14/25  0509 02/13/25  0800 02/12/25  0541 02/11/25  0420 02/10/25  0406   WBC Thousand/uL 7.88  --   --  8.48  --  8.89 8.37 9.56   HEMOGLOBIN g/dL 9.0*  --   --  9.4*  --  9.8* 9.6* 10.0*   HEMATOCRIT % 28.3*  --   --  29.1*  --  30.0* 29.2* 31.4*   PLATELETS Thousands/uL 212  --   --  184  --  134* 128* 119*   POTASSIUM mmol/L 3.8 3.7 4.0 3.3* 3.6 3.4* 3.2* 3.4*   CHLORIDE mmol/L 106 108 108 107 106 106 106 107   CO2 mmol/L 24 25 23 24 23 23 20* 16*   BUN mg/dL 18 22 23 25 29* 33* 36* 34*   CREATININE mg/dL 3.57* 3.79* 3.93* 3.95* 4.20* 4.44* 4.64* 4.78*   CALCIUM mg/dL 7.4* 7.3* 7.3* 7.3* 7.4* 7.3* 7.2* 7.2*   MAGNESIUM mg/dL 2.1 1.8*  --  1.8* 1.7* 1.8* 1.5*  --    PHOSPHORUS mg/dL 3.4 3.5  --  3.4 2.5* 1.8*  --   --    ALBUMIN g/dL 2.4*  --  2.3* 2.2*  --  2.2* 2.0* 2.0*       Administrative Statements     Portions of the record may have been created with voice recognition software. Occasional wrong word or \"sound a like\" " substitutions may have occurred due to the inherent limitations of voice recognition software. Read the chart carefully and recognize, using context, where substitutions have occurred.If you have any questions, please contact the dictating provider.

## 2025-02-16 NOTE — ASSESSMENT & PLAN NOTE
Has improved with concern for possible volume overload.  Agree with discontinuing IV fluids 2/15 due to increasing BLE edema.  Blood pressure stable, -150 past 24 hours on 2/16.  As noted above, albumin is decreased,  5 L urine output/24-hour and significant BLE edema.  Will provide albumin as colloid replacement to assist with fluid mobilization.

## 2025-02-16 NOTE — ASSESSMENT & PLAN NOTE
Likely due to prerenal azotemia and rhabdomyolysis, discontinue IV fluid on 2/15/2025    Suspected component of ATN, appreciate nephrology input    Creatinine continues to trend down

## 2025-02-16 NOTE — PLAN OF CARE
Problem: SKIN/TISSUE INTEGRITY - ADULT  Goal: Skin Integrity remains intact(Skin Breakdown Prevention)  Description: Assess:  -Perform Tani assessment every 2  -Clean and moisturize skin every incont. Episode   -Inspect skin when repositioning, toileting, and assisting with ADLS  -Assess extremities for adequate circulation and sensation     Bed Management:  -Have minimal linens on bed & keep smooth, unwrinkled  -Change linens as needed when moist or perspiring  -Avoid sitting or lying in one position for more than 2 hours while in bed  -Keep HOB at 30 degrees     Toileting:  -Offer bedside commode  -Assess for incontinence every shift  -Use incontinent care products after each incontinent episode such as alejandra wipes    Activity:  -Mobilize patient 3 times a day  -Encourage activity and walks on unit  -Encourage or provide ROM exercises   -Turn and reposition patient every 2 Hours  -Use appropriate equipment to lift or move patient in bed  -Instruct/ Assist with weight shifting every 60 mins when out of bed in chair  -Consider limitation of chair time 6 hour intervals    Skin Care:  -Avoid use of baby powder, tape, friction and shearing, hot water or constrictive clothing  -Relieve pressure over bony prominences using foam wedges/pillows  -Do not massage red bony areas    Outcome: Progressing     Problem: PAIN - ADULT  Goal: Verbalizes/displays adequate comfort level or baseline comfort level  Description: Interventions:  - Encourage patient to monitor pain and request assistance  - Assess pain using appropriate pain scale  - Administer analgesics based on type and severity of pain and evaluate response  - Implement non-pharmacological measures as appropriate and evaluate response  - Consider cultural and social influences on pain and pain management  - Notify physician/advanced practitioner if interventions unsuccessful or patient reports new pain  Outcome: Progressing     Problem: SAFETY ADULT  Goal: Patient  will remain free of falls  Description: INTERVENTIONS:  - Educate patient/family on patient safety including physical limitations  - Instruct patient to call for assistance with activity   - Consult OT/PT to assist with strengthening/mobility   - Keep Call bell within reach  - Keep bed low and locked with side rails adjusted as appropriate  - Keep care items and personal belongings within reach  - Initiate and maintain comfort rounds  - Make Fall Risk Sign visible to staff  - Offer Toileting every 2 Hours, in advance of need  - Initiate/Maintain bed/chair alarm  - Obtain necessary fall risk management equipment: non skid socks  - Apply yellow socks and bracelet for high fall risk patients  - Consider moving patient to room near nurses station  Outcome: Progressing  Goal: Maintain or return to baseline ADL function  Description: INTERVENTIONS:  -  Assess patient's ability to carry out ADLs; assess patient's baseline for ADL function and identify physical deficits which impact ability to perform ADLs (bathing, care of mouth/teeth, toileting, grooming, dressing, etc.)  - Assess/evaluate cause of self-care deficits   - Assess range of motion  - Assess patient's mobility; develop plan if impaired  - Assess patient's need for assistive devices and provide as appropriate  - Encourage maximum independence but intervene and supervise when necessary  - Involve family in performance of ADLs  - Assess for home care needs following discharge   - Consider OT consult to assist with ADL evaluation and planning for discharge  - Provide patient education as appropriate  Outcome: Progressing  Goal: Maintains/Returns to pre admission functional level  Description: INTERVENTIONS:  - Perform AM-PAC 6 Click Basic Mobility/ Daily Activity assessment daily.  - Set and communicate daily mobility goal to care team and patient/family/caregiver.   - Collaborate with rehabilitation services on mobility goals if consulted  - Perform Range of  Motion 3 times a day.  - Reposition patient every 2 hours.  - Dangle patient 3 times a day  - Stand patient 3 times a day  - Ambulate patient 3 times a day  - Out of bed to chair 3 times a day   - Out of bed for meals 3 times a day  - Out of bed for toileting  - Record patient progress and toleration of activity level   Outcome: Progressing

## 2025-02-16 NOTE — ASSESSMENT & PLAN NOTE
Continue supportive care at this time; the patient may be developing nonoliguric ATN.  5L UOP on 2/16.

## 2025-02-16 NOTE — ASSESSMENT & PLAN NOTE
Etiology is attributed to acute tubular necrosis from volume depletion and intrinsic causes from rhabdomyolysis. There has been improvement in the patient's kidney function.  At its peak the creatinine was as high as 4.7 during this admission but has improved to 3.57 mg/dL today, 2/16.  UOP 2.75L on 2/14. Concern for possible development of diuretic ATN.  5 L UOP on 2/15.  Patient noted to have new onset 3+ BLE edema.  Need to follow hemodynamics and fluid status closely as patient is still having significant diarrhea.    The patient had been on 2 different IV fluids due to volume deficit, diarrhea, rhabdomyolysis and bicarb deficit.  IV fluids discontinued by primary team 2/15 with concern for volume overload.  Bicarbonate stable at 24 mmol/L, 2/16.  Consider reassessing bicarb infusion if bicarb level drop.  Albumin 2.4 g/dL, will provide albumin every 8 hours x 3 doses to provide colloid replacement and assist with fluid mobilization.

## 2025-02-16 NOTE — ASSESSMENT & PLAN NOTE
CK decreased from as high as 18,000 on February 10 to 1399 on February 16.  Continue to trend levels.

## 2025-02-16 NOTE — PROGRESS NOTES
Progress Note - Hospitalist   Name: Emiliano Rodriguez 49 y.o. male I MRN: [id number]  Unit/Bed#: 404-01 I Date of Admission: 2/7/2025   Date of Service: 2/16/2025 I Hospital Day: 9    Assessment & Plan  Rhabdomyolysis  CK level improving off of IV fluid, IV albumin ordered today by nephrology    Patient with significant bilateral lower extremity edema, is becoming uncomfortable, slight decrease today and lower extremity edema.    Acute renal failure (ARF) (HCC)  Likely due to prerenal azotemia and rhabdomyolysis, discontinue IV fluid on 2/15/2025    Suspected component of ATN, appreciate nephrology input    Creatinine continues to trend down  Diarrhea  Rectal tube removed, continue to monitor stool output, seems improved.    Medical management per GI  Weakness of left lower extremity  MRI negative for acute CVA, muscle strength seems to be improving, continue IV thiamine.  Abnormal LFTs  Patient with hyperbilirbuinemia and elevated LFT in the setting of rhabdomyolysis and ETOH use    Likely multifactorial including alcohol use, fatty liver disease, rhabdomyolysis.    Continues to trend down    Gastroesophageal reflux disease without esophagitis  Protonix 40 mg daily    Outpatient EGD per GI  Alcohol abuse  Endorses at least 5 beers a day, although I suspect this is understated  Folic acid, thiamine  Patient did not exhibit any signs of acute alcohol withdrawal, CIWA protocol was not needed, it has been discontinued.  Hypokalemia  Monitor potassium level daily  Volume depletion  IV fluid discontinued  Acute tubular necrosis (HCC)  Suspected component of ATN, nephrology consult appreciated.  Hypomagnesemia  IV magnesium as needed  Hypophosphatemia  Phosphorus level normal today    VTE Pharmacologic Prophylaxis:   Moderate Risk (Score 3-4) - Pharmacological DVT Prophylaxis Ordered: heparin.    Mobility:   Basic Mobility Inpatient Raw Score: 14  -[facility] Goal: 4: Move to chair/commode  -[facility] Achieved: 4: Move to  chair/commode  -[facility] Goal achieved. Continue to encourage appropriate mobility.    Patient Centered Rounds: I performed bedside rounds with nursing staff today.   Discussions with Specialists or Other Care Team Provider: Discussed with nephrology    Education and Discussions with Family / Patient: Patient declined call to .     Current Length of Stay: 9 day(s)  Current Patient Status: Inpatient   Certification Statement: The patient will continue to require additional inpatient hospital stay due to elevated CPK severe diarrhea  Discharge Plan: Anticipate discharge in 48-72 hrs to home.  Patient is refusing short-term rehab placement    Code Status: Level 1 - Full Code    Subjective   Diarrhea worsened again overnight    Objective :  Temp:  [98 °F (36.7 °C)-98.2 °F (36.8 °C)] 98 °F (36.7 °C)  HR:  [85-91] 88  BP: (129-150)/(90-96) 129/94  Resp:  [15-16] 15  SpO2:  [95 %-98 %] 95 %  O2 Device: None (Room air)    Body mass index is 36.97 kg/m².     Input and Output Summary (last 24 hours):     Intake/Output Summary (Last 24 hours) at 2/16/2025 1211  Last data filed at 2/16/2025 1148  Gross per 24 hour   Intake 2080 ml   Output 5390 ml   Net -3310 ml       Physical Exam  Vitals and nursing note reviewed.   HENT:      Head: Normocephalic and atraumatic.      Right Ear: External ear normal.      Left Ear: External ear normal.   Cardiovascular:      Rate and Rhythm: Normal rate.      Pulses: Normal pulses.   Pulmonary:      Effort: Pulmonary effort is normal.   Abdominal:      General: Abdomen is flat. There is no distension.      Palpations: Abdomen is soft.      Tenderness: There is no abdominal tenderness.   Musculoskeletal:      Right lower leg: Edema present.      Left lower leg: Edema present.   Neurological:      Mental Status: He is alert and oriented to person, place, and time. Mental status is at baseline.      Cranial Nerves: No cranial nerve deficit.      Motor: No weakness.   Psychiatric:          Mood and Affect: Mood normal.         Behavior: Behavior normal.         Thought Content: Thought content normal.         Judgment: Judgment normal.           Lines/Drains:              Lab Results: I have reviewed the following results:   Results from last 7 days   Lab Units 02/16/25  0434   WBC Thousand/uL 7.88   HEMOGLOBIN g/dL 9.0*   HEMATOCRIT % 28.3*   PLATELETS Thousands/uL 212   SEGS PCT % 74   LYMPHO PCT % 13*   MONO PCT % 9   EOS PCT % 2     Results from last 7 days   Lab Units 02/16/25  0434   SODIUM mmol/L 137   POTASSIUM mmol/L 3.8   CHLORIDE mmol/L 106   CO2 mmol/L 24   BUN mg/dL 18   CREATININE mg/dL 3.57*   ANION GAP mmol/L 7   CALCIUM mg/dL 7.4*   ALBUMIN g/dL 2.4*   TOTAL BILIRUBIN mg/dL 1.48*   ALK PHOS U/L 115*   ALT U/L 162*   AST U/L 135*   GLUCOSE RANDOM mg/dL 103     Results from last 7 days   Lab Units 02/16/25  0434   INR  1.09                   Recent Cultures (last 7 days):         Imaging Results Review: No pertinent imaging studies reviewed.  Other Study Results Review: No additional pertinent studies reviewed.    Last 24 Hours Medication List:     Current Facility-Administered Medications:     acetaminophen (TYLENOL) tablet 650 mg, Q6H PRN    albumin human (FLEXBUMIN) 25 % injection 12.5 g, Q8H DAVION    clopidogrel (PLAVIX) tablet 75 mg, Daily    folic acid (FOLVITE) tablet 1 mg, Daily    heparin (porcine) subcutaneous injection 5,000 Units, Q8H DAVION **AND** [COMPLETED] Platelet count, Once    loperamide (IMODIUM) capsule 4 mg, 4x Daily    metoprolol succinate (TOPROL-XL) 24 hr tablet 25 mg, Daily    moisture barrier miconazole 2% cream (aka MELODY MOISTURE BARRIER ANTIFUNGAL CREAM), BID    nicotine (NICODERM CQ) 21 mg/24 hr TD 24 hr patch 1 patch, Daily    pantoprazole (PROTONIX) EC tablet 40 mg, Early Morning    saccharomyces boulardii (FLORASTOR) capsule 250 mg, BID    simethicone (MYLICON) chewable tablet 80 mg, Q6H PRN    traZODone (DESYREL) tablet 50 mg, HS PRN    Administrative  Statements   Today, Patient Was Seen By: Isaías Eller DO      **Please Note: This note may have been constructed using a voice recognition system.**

## 2025-02-17 PROBLEM — R60.0 BILATERAL LOWER EXTREMITY EDEMA: Status: ACTIVE | Noted: 2025-02-17

## 2025-02-17 PROBLEM — E87.8 ELECTROLYTE IMBALANCE: Status: ACTIVE | Noted: 2025-02-17

## 2025-02-17 LAB
ALBUMIN SERPL BCG-MCNC: 2.5 G/DL (ref 3.5–5)
ALP SERPL-CCNC: 100 U/L (ref 34–104)
ALT SERPL W P-5'-P-CCNC: 129 U/L (ref 7–52)
ANION GAP SERPL CALCULATED.3IONS-SCNC: 9 MMOL/L (ref 4–13)
AST SERPL W P-5'-P-CCNC: 90 U/L (ref 13–39)
BASOPHILS # BLD AUTO: 0.05 THOUSANDS/ΜL (ref 0–0.1)
BASOPHILS NFR BLD AUTO: 1 % (ref 0–1)
BILIRUB SERPL-MCNC: 1.45 MG/DL (ref 0.2–1)
BUN SERPL-MCNC: 17 MG/DL (ref 5–25)
CALCIUM ALBUM COR SERPL-MCNC: 8.9 MG/DL (ref 8.3–10.1)
CALCIUM SERPL-MCNC: 7.7 MG/DL (ref 8.4–10.2)
CHLORIDE SERPL-SCNC: 105 MMOL/L (ref 96–108)
CK SERPL-CCNC: 813 U/L (ref 39–308)
CO2 SERPL-SCNC: 23 MMOL/L (ref 21–32)
CREAT SERPL-MCNC: 3.07 MG/DL (ref 0.6–1.3)
EOSINOPHIL # BLD AUTO: 0.16 THOUSAND/ΜL (ref 0–0.61)
EOSINOPHIL NFR BLD AUTO: 2 % (ref 0–6)
ERYTHROCYTE [DISTWIDTH] IN BLOOD BY AUTOMATED COUNT: 14.9 % (ref 11.6–15.1)
GFR SERPL CREATININE-BSD FRML MDRD: 22 ML/MIN/1.73SQ M
GLUCOSE SERPL-MCNC: 87 MG/DL (ref 65–140)
HCT VFR BLD AUTO: 26.9 % (ref 36.5–49.3)
HGB BLD-MCNC: 8.6 G/DL (ref 12–17)
IMM GRANULOCYTES # BLD AUTO: 0.04 THOUSAND/UL (ref 0–0.2)
IMM GRANULOCYTES NFR BLD AUTO: 0 % (ref 0–2)
LYMPHOCYTES # BLD AUTO: 1.27 THOUSANDS/ΜL (ref 0.6–4.47)
LYMPHOCYTES NFR BLD AUTO: 14 % (ref 14–44)
MAGNESIUM SERPL-MCNC: 1.6 MG/DL (ref 1.9–2.7)
MCH RBC QN AUTO: 33.3 PG (ref 26.8–34.3)
MCHC RBC AUTO-ENTMCNC: 32 G/DL (ref 31.4–37.4)
MCV RBC AUTO: 104 FL (ref 82–98)
MONOCYTES # BLD AUTO: 0.72 THOUSAND/ΜL (ref 0.17–1.22)
MONOCYTES NFR BLD AUTO: 8 % (ref 4–12)
NEUTROPHILS # BLD AUTO: 6.73 THOUSANDS/ΜL (ref 1.85–7.62)
NEUTS SEG NFR BLD AUTO: 75 % (ref 43–75)
NRBC BLD AUTO-RTO: 0 /100 WBCS
PHOSPHATE SERPL-MCNC: 3.9 MG/DL (ref 2.7–4.5)
PLATELET # BLD AUTO: 212 THOUSANDS/UL (ref 149–390)
PMV BLD AUTO: 10.7 FL (ref 8.9–12.7)
POTASSIUM SERPL-SCNC: 3.9 MMOL/L (ref 3.5–5.3)
PROT SERPL-MCNC: 5.4 G/DL (ref 6.4–8.4)
RBC # BLD AUTO: 2.58 MILLION/UL (ref 3.88–5.62)
SODIUM SERPL-SCNC: 137 MMOL/L (ref 135–147)
WBC # BLD AUTO: 8.97 THOUSAND/UL (ref 4.31–10.16)

## 2025-02-17 PROCEDURE — 80053 COMPREHEN METABOLIC PANEL: CPT | Performed by: INTERNAL MEDICINE

## 2025-02-17 PROCEDURE — 97116 GAIT TRAINING THERAPY: CPT

## 2025-02-17 PROCEDURE — 85025 COMPLETE CBC W/AUTO DIFF WBC: CPT | Performed by: INTERNAL MEDICINE

## 2025-02-17 PROCEDURE — 84100 ASSAY OF PHOSPHORUS: CPT | Performed by: INTERNAL MEDICINE

## 2025-02-17 PROCEDURE — 99232 SBSQ HOSP IP/OBS MODERATE 35: CPT | Performed by: INTERNAL MEDICINE

## 2025-02-17 PROCEDURE — 99232 SBSQ HOSP IP/OBS MODERATE 35: CPT

## 2025-02-17 PROCEDURE — 83735 ASSAY OF MAGNESIUM: CPT | Performed by: INTERNAL MEDICINE

## 2025-02-17 PROCEDURE — 97530 THERAPEUTIC ACTIVITIES: CPT

## 2025-02-17 PROCEDURE — 82550 ASSAY OF CK (CPK): CPT | Performed by: INTERNAL MEDICINE

## 2025-02-17 RX ORDER — ALBUMIN (HUMAN) 12.5 G/50ML
12.5 SOLUTION INTRAVENOUS EVERY 8 HOURS SCHEDULED
Status: COMPLETED | OUTPATIENT
Start: 2025-02-17 | End: 2025-02-19

## 2025-02-17 RX ORDER — MAGNESIUM SULFATE HEPTAHYDRATE 40 MG/ML
2 INJECTION, SOLUTION INTRAVENOUS ONCE
Status: COMPLETED | OUTPATIENT
Start: 2025-02-17 | End: 2025-02-17

## 2025-02-17 RX ORDER — DIPHENOXYLATE HYDROCHLORIDE AND ATROPINE SULFATE 2.5; .025 MG/1; MG/1
1 TABLET ORAL 4 TIMES DAILY PRN
Status: DISCONTINUED | OUTPATIENT
Start: 2025-02-17 | End: 2025-02-19 | Stop reason: HOSPADM

## 2025-02-17 RX ADMIN — METOPROLOL SUCCINATE 25 MG: 25 TABLET, EXTENDED RELEASE ORAL at 09:16

## 2025-02-17 RX ADMIN — CLOPIDOGREL 75 MG: 75 TABLET ORAL at 09:17

## 2025-02-17 RX ADMIN — FOLIC ACID 1 MG: 1 TABLET ORAL at 09:17

## 2025-02-17 RX ADMIN — NICOTINE 1 PATCH: 21 PATCH, EXTENDED RELEASE TRANSDERMAL at 09:17

## 2025-02-17 RX ADMIN — LOPERAMIDE HYDROCHLORIDE 4 MG: 2 CAPSULE ORAL at 12:54

## 2025-02-17 RX ADMIN — Medication 250 MG: at 17:13

## 2025-02-17 RX ADMIN — MICONAZOLE NITRATE: 20 CREAM TOPICAL at 17:14

## 2025-02-17 RX ADMIN — MAGNESIUM SULFATE HEPTAHYDRATE 2 G: 40 INJECTION, SOLUTION INTRAVENOUS at 09:16

## 2025-02-17 RX ADMIN — HEPARIN SODIUM 5000 UNITS: 5000 INJECTION, SOLUTION INTRAVENOUS; SUBCUTANEOUS at 15:05

## 2025-02-17 RX ADMIN — LOPERAMIDE HYDROCHLORIDE 4 MG: 2 CAPSULE ORAL at 09:17

## 2025-02-17 RX ADMIN — ALBUMIN (HUMAN) 12.5 G: 0.25 INJECTION, SOLUTION INTRAVENOUS at 15:05

## 2025-02-17 RX ADMIN — MICONAZOLE NITRATE: 20 CREAM TOPICAL at 09:17

## 2025-02-17 RX ADMIN — PANTOPRAZOLE SODIUM 40 MG: 40 TABLET, DELAYED RELEASE ORAL at 05:38

## 2025-02-17 RX ADMIN — LOPERAMIDE HYDROCHLORIDE 4 MG: 2 CAPSULE ORAL at 23:15

## 2025-02-17 RX ADMIN — LOPERAMIDE HYDROCHLORIDE 4 MG: 2 CAPSULE ORAL at 17:13

## 2025-02-17 RX ADMIN — HEPARIN SODIUM 5000 UNITS: 5000 INJECTION, SOLUTION INTRAVENOUS; SUBCUTANEOUS at 23:15

## 2025-02-17 RX ADMIN — ALBUMIN (HUMAN) 12.5 G: 0.25 INJECTION, SOLUTION INTRAVENOUS at 05:38

## 2025-02-17 RX ADMIN — HEPARIN SODIUM 5000 UNITS: 5000 INJECTION, SOLUTION INTRAVENOUS; SUBCUTANEOUS at 05:38

## 2025-02-17 RX ADMIN — TRAZODONE HYDROCHLORIDE 50 MG: 50 TABLET ORAL at 23:26

## 2025-02-17 RX ADMIN — ALBUMIN (HUMAN) 12.5 G: 0.25 INJECTION, SOLUTION INTRAVENOUS at 23:15

## 2025-02-17 RX ADMIN — Medication 250 MG: at 09:17

## 2025-02-17 NOTE — PLAN OF CARE
Problem: SKIN/TISSUE INTEGRITY - ADULT  Goal: Skin Integrity remains intact(Skin Breakdown Prevention)  Description: Assess:  -Perform Tani assessment every 2  -Clean and moisturize skin every incont. Episode   -Inspect skin when repositioning, toileting, and assisting with ADLS  -Assess extremities for adequate circulation and sensation     Bed Management:  -Have minimal linens on bed & keep smooth, unwrinkled  -Change linens as needed when moist or perspiring  -Avoid sitting or lying in one position for more than 2 hours while in bed  -Keep HOB at 30 degrees     Toileting:  -Offer bedside commode  -Assess for incontinence every shift  -Use incontinent care products after each incontinent episode such as alejandra wipes    Activity:  -Mobilize patient 3 times a day  -Encourage activity and walks on unit  -Encourage or provide ROM exercises   -Turn and reposition patient every 2 Hours  -Use appropriate equipment to lift or move patient in bed  -Instruct/ Assist with weight shifting every 60 mins when out of bed in chair  -Consider limitation of chair time 6 hour intervals    Skin Care:  -Avoid use of baby powder, tape, friction and shearing, hot water or constrictive clothing  -Relieve pressure over bony prominences using foam wedges/pillows  -Do not massage red bony areas    Outcome: Progressing     Problem: MUSCULOSKELETAL - ADULT  Goal: Maintain or return mobility to safest level of function  Description: INTERVENTIONS:  - Assess patient's ability to carry out ADLs; assess patient's baseline for ADL function and identify physical deficits which impact ability to perform ADLs (bathing, care of mouth/teeth, toileting, grooming, dressing, etc.)  - Assess/evaluate cause of self-care deficits   - Assess range of motion  - Assess patient's mobility  - Assess patient's need for assistive devices and provide as appropriate  - Encourage maximum independence but intervene and supervise when necessary  - Involve family in  performance of ADLs  - Assess for home care needs following discharge   - Consider OT consult to assist with ADL evaluation and planning for discharge  - Provide patient education as appropriate  Outcome: Progressing     Problem: PAIN - ADULT  Goal: Verbalizes/displays adequate comfort level or baseline comfort level  Description: Interventions:  - Encourage patient to monitor pain and request assistance  - Assess pain using appropriate pain scale  - Administer analgesics based on type and severity of pain and evaluate response  - Implement non-pharmacological measures as appropriate and evaluate response  - Consider cultural and social influences on pain and pain management  - Notify physician/advanced practitioner if interventions unsuccessful or patient reports new pain  Outcome: Progressing     Problem: INFECTION - ADULT  Goal: Absence or prevention of progression during hospitalization  Description: INTERVENTIONS:  - Assess and monitor for signs and symptoms of infection  - Monitor lab/diagnostic results  - Monitor all insertion sites, i.e. indwelling lines, tubes, and drains  - Monitor endotracheal if appropriate and nasal secretions for changes in amount and color  - Kerby appropriate cooling/warming therapies per order  - Administer medications as ordered  - Instruct and encourage patient and family to use good hand hygiene technique  - Identify and instruct in appropriate isolation precautions for identified infection/condition  Outcome: Progressing     Problem: SAFETY ADULT  Goal: Patient will remain free of falls  Description: INTERVENTIONS:  - Educate patient/family on patient safety including physical limitations  - Instruct patient to call for assistance with activity   - Consult OT/PT to assist with strengthening/mobility   - Keep Call bell within reach  - Keep bed low and locked with side rails adjusted as appropriate  - Keep care items and personal belongings within reach  - Initiate and  maintain comfort rounds  - Make Fall Risk Sign visible to staff  - Offer Toileting every 2 Hours, in advance of need  - Initiate/Maintain bed/chair alarm  - Obtain necessary fall risk management equipment: non skid socks  - Apply yellow socks and bracelet for high fall risk patients  - Consider moving patient to room near nurses station  Outcome: Progressing  Goal: Maintain or return to baseline ADL function  Description: INTERVENTIONS:  -  Assess patient's ability to carry out ADLs; assess patient's baseline for ADL function and identify physical deficits which impact ability to perform ADLs (bathing, care of mouth/teeth, toileting, grooming, dressing, etc.)  - Assess/evaluate cause of self-care deficits   - Assess range of motion  - Assess patient's mobility; develop plan if impaired  - Assess patient's need for assistive devices and provide as appropriate  - Encourage maximum independence but intervene and supervise when necessary  - Involve family in performance of ADLs  - Assess for home care needs following discharge   - Consider OT consult to assist with ADL evaluation and planning for discharge  - Provide patient education as appropriate  Outcome: Progressing  Goal: Maintains/Returns to pre admission functional level  Description: INTERVENTIONS:  - Perform AM-PAC 6 Click Basic Mobility/ Daily Activity assessment daily.  - Set and communicate daily mobility goal to care team and patient/family/caregiver.   - Collaborate with rehabilitation services on mobility goals if consulted  - Perform Range of Motion 3 times a day.  - Reposition patient every 2 hours.  - Dangle patient 3 times a day  - Stand patient 3 times a day  - Ambulate patient 3 times a day  - Out of bed to chair 3 times a day   - Out of bed for meals 3 times a day  - Out of bed for toileting  - Record patient progress and toleration of activity level   Outcome: Progressing     Problem: DISCHARGE PLANNING  Goal: Discharge to home or other facility  with appropriate resources  Description: INTERVENTIONS:  - Identify barriers to discharge w/patient and caregiver  - Arrange for needed discharge resources and transportation as appropriate  - Identify discharge learning needs (meds, wound care, etc.)  - Arrange for interpretive services to assist at discharge as needed  - Refer to Case Management Department for coordinating discharge planning if the patient needs post-hospital services based on physician/advanced practitioner order or complex needs related to functional status, cognitive ability, or social support system  Outcome: Progressing     Problem: Knowledge Deficit  Goal: Patient/family/caregiver demonstrates understanding of disease process, treatment plan, medications, and discharge instructions  Description: Complete learning assessment and assess knowledge base.  Interventions:  - Provide teaching at level of understanding  - Provide teaching via preferred learning methods  Outcome: Progressing     Problem: Prexisting or High Potential for Compromised Skin Integrity  Goal: Skin integrity is maintained or improved  Description: INTERVENTIONS:  - Identify patients at risk for skin breakdown  - Assess and monitor skin integrity  - Assess and monitor nutrition and hydration status  - Monitor labs   - Assess for incontinence   - Turn and reposition patient  - Assist with mobility/ambulation  - Relieve pressure over bony prominences  - Avoid friction and shearing  - Provide appropriate hygiene as needed including keeping skin clean and dry  - Evaluate need for skin moisturizer/barrier cream  - Collaborate with interdisciplinary team   - Patient/family teaching  - Consider wound care consult   Outcome: Progressing     Problem: GASTROINTESTINAL - ADULT  Goal: Maintains or returns to baseline bowel function  Description: INTERVENTIONS:  - Assess bowel function  - Encourage oral fluids to ensure adequate hydration  - Administer IV fluids if ordered to ensure  adequate hydration  - Administer ordered medications as needed  - Encourage mobilization and activity  - Consider nutritional services referral to assist patient with adequate nutrition and appropriate food choices  Outcome: Progressing     Problem: Nutrition/Hydration-ADULT  Goal: Nutrient/Hydration intake appropriate for improving, restoring or maintaining nutritional needs  Description: Monitor and assess patient's nutrition/hydration status for malnutrition. Collaborate with interdisciplinary team and initiate plan and interventions as ordered.  Monitor patient's weight and dietary intake as ordered or per policy. Utilize nutrition screening tool and intervene as necessary. Determine patient's food preferences and provide high-protein, high-caloric foods as appropriate.     INTERVENTIONS:  - Monitor oral intake, urinary output, labs, and treatment plans  - Assess nutrition and hydration status and recommend course of action  - Evaluate amount of meals eaten  - Assist patient with eating if necessary   - Allow adequate time for meals  - Recommend/ encourage appropriate diets, oral nutritional supplements, and vitamin/mineral supplements  - Order, calculate, and assess calorie counts as needed  - Recommend, monitor, and adjust tube feedings and TPN/PPN based on assessed needs  - Assess need for intravenous fluids  - Provide specific nutrition/hydration education as appropriate  - Include patient/family/caregiver in decisions related to nutrition  Outcome: Progressing

## 2025-02-17 NOTE — ASSESSMENT & PLAN NOTE
Limited labs to review in steady state with baseline creatinine appears to be around 0.9 mg/dL.  Peak creatinine 4.8 mg/dL on 2/10-> 3.1 mg/dL today.  UA significant for large blood, 2+ protein.  Renal ultrasound without hydronephrosis.  Etiology of GUNJAN severe prerenal state in addition to rhabdomyolysis.  Fluids discontinued 2/15.  Examines volume overloaded however polyuric and with ongoing frequent stools.    Potentially in recovery phase of ATN.  4.8 L urine output in the last 24 hours.  Seems to be slowing down at present.  Given significant edema on exam, allowing auto diuresis.  Will need to closely monitor I's/O and offer volume expansion if indicated.  CK less than 1000 no need to trend.

## 2025-02-17 NOTE — ASSESSMENT & PLAN NOTE
MRI negative for acute CVA, note does report baseline L sided weakness from prior stroke  Muscle strength seems to be improving  Completed 5-days of IV thiamine. Continue with oral supplementation

## 2025-02-17 NOTE — PROGRESS NOTES
VM on pt phone to call back for appt. Needs OB new with NP. Records scanned. [facility] - [place], [place]  Progress Note   Emiliano Rodriguez 49 y.o. male MRN: [id number]  Unit/Bed#: 404-01 Encounter: [id number]      Brief summary of hospitalization: 49-year-old man with EtOH, GERD, smoker admitted 2/7 being treated for ATN GUNJAN in setting of rhabdomyolysis, volume depleted state, diarrhea.    Assessment & Plan  Acute renal failure (ARF) (HCC)  Limited labs to review in steady state with baseline creatinine appears to be around 0.9 mg/dL.  Peak creatinine 4.8 mg/dL on 2/10-> 3.1 mg/dL today.  UA significant for large blood, 2+ protein.  Renal ultrasound without hydronephrosis.  Etiology of GUNJAN severe prerenal state in addition to rhabdomyolysis.  Fluids discontinued 2/15.  Examines volume overloaded however polyuric and with ongoing frequent stools.    Potentially in recovery phase of ATN.  4.8 L urine output in the last 24 hours.  Seems to be slowing down at present.  Given significant edema on exam, allowing auto diuresis.  Will need to closely monitor I's/O and offer volume expansion if indicated.  CK less than 1000 no need to trend.        Rhabdomyolysis  Total CK less than 1000, no need to trend  Bilateral lower extremity edema  In setting of aggressive resuscitation earlier in admission and profound hypoalbuminemia.  Avoiding diuresis given polyuria and frequent stools.  Allow auto diuresis.  Attempt to maintain even balance.    Electrolyte imbalance  Patient will receive IV magnesium today.  Phosphorus and potassium appropriate.  Volume depletion  Resolved however at risk for reoccurrence given polyuria and frequent stool.  Monitor I's/O closely and offer volume expansion if indicated  Diarrhea  Patient states stool is thickening up.  Continue Banatrol, probiotics, Imodium per gastroenterology  Abnormal LFTs  In setting of rhabdomyolysis and alcohol use.  Appreciate gastroenterology's input      I have reviewed the nephrology recommendations including defer diuretics  "and allow auto diuresis, with Dr. Katz, and we are in agreement with renal plan including the information outlined above.    SUBJECTIVE / 24H INTERVAL HISTORY:  Patient examined sitting upright in chair.  States he urinated a lot but also continues to have frequent stools however stools becoming thicker.  Patient states \"I have never been this swollen in my life\"      Historical Information   Past Medical History:   Diagnosis Date    GERD (gastroesophageal reflux disease)     TIA (transient ischemic attack)      History reviewed. No pertinent surgical history.  Social History   Social History     Substance and Sexual Activity   Alcohol Use Yes    Alcohol/week: 42.0 standard drinks of alcohol    Types: 42 Cans of beer per week    Comment: 6 pack a day     Social History     Substance and Sexual Activity   Drug Use No     Social History     Tobacco Use   Smoking Status Every Day    Current packs/day: 2.00    Average packs/day: 2.0 packs/day for 25.0 years (50.0 ttl pk-yrs)    Types: Cigarettes   Smokeless Tobacco Never       Family History:   Family History   Problem Relation Age of Onset    No Known Problems Mother     No Known Problems Father        Medications:  Pertinent medications were reviewed  Current Facility-Administered Medications   Medication Dose Route Frequency Provider Last Rate    acetaminophen  650 mg Oral Q6H PRN Prudencio Nix MD      clopidogrel  75 mg Oral Daily Ankita Katz MD      folic acid  1 mg Oral Daily Ankita Katz MD      heparin (porcine)  5,000 Units Subcutaneous Q8H DAVION Ankita Katz MD      loperamide  4 mg Oral 4x Daily Isaías Eller DO      magnesium sulfate  2 g Intravenous Once Taylor Champion PA-C 2 g (02/17/25 0916)    metoprolol succinate  25 mg Oral Daily Ankita Katz MD      MELODY ANTIFUNGAL   Topical BID Isaías Eller DO      nicotine  1 patch Transdermal Daily Ankita Katz MD      pantoprazole  40 mg Oral Early Morning Ankita Katz, " "MD      saccharomyces boulardii  250 mg Oral BID Ankita Katz MD      simethicone  80 mg Oral Q6H PRN Isaías Eller DO      traZODone  50 mg Oral HS PRN Garcia Escobar MD           No Known Allergies      Vitals:   /80 (BP Location: Right arm)   Pulse 86   Temp 98.1 °F (36.7 °C) (Oral)   Resp 17   Ht 5' 6\" (1.676 m)   Wt 104 kg (228 lb 9.9 oz)   SpO2 98%   BMI 36.90 kg/m²   Body mass index is 36.9 kg/m².  SpO2: 98 %,   SpO2 Activity: At Rest,   O2 Device: None (Room air)      Intake/Output Summary (Last 24 hours) at 2/17/2025 1012  Last data filed at 2/17/2025 0801  Gross per 24 hour   Intake 980 ml   Output 4850 ml   Net -3870 ml     Invasive Devices       Peripheral Intravenous Line  Duration             Peripheral IV 02/14/25 Dorsal (posterior);Left Forearm 2 days                    Physical Exam  General: conscious, cooperative, in no acute distress  Eyes: conjunctivae pink, anicteric sclerae  ENT: lips and mucous membranes moist  Neck: supple, no JVD, no masses  Chest: clear breath sounds bilaterally, no crackles, ronchus or wheezings  CVS: distinct S1 & S2, normal rate, regular rhythm  Abdomen: soft, non-tender, non-distended, normoactive bowel sounds obese  Extremities: Severe edema of both legs  Skin: no rash  Neuro: awake, alert, oriented    Diagnostic Data:  Lab: I have personally reviewed pertinent lab results.  CBC:  Results from last 7 days   Lab Units 02/17/25  0442   WBC Thousand/uL 8.97   HEMOGLOBIN g/dL 8.6*   HEMATOCRIT % 26.9*   PLATELETS Thousands/uL 212      CMP:   Lab Results   Component Value Date    SODIUM 137 02/17/2025    K 3.9 02/17/2025     02/17/2025    CO2 23 02/17/2025    BUN 17 02/17/2025    CREATININE 3.07 (H) 02/17/2025    CALCIUM 7.7 (L) 02/17/2025    AST 90 (H) 02/17/2025     (H) 02/17/2025    ALKPHOS 100 02/17/2025    EGFR 22 02/17/2025   ,   PT/INR: No results found for: \"PT\", \"INR\",   Magnesium: No components found for: \"MAG\",  Phosphorous: " "  Lab Results   Component Value Date    PHOS 3.9 02/17/2025       Microbiology:  @LABRCNT,(urinecx:7)@        LAURIE Manley    Portions of the record may have been created with voice recognition software. Occasional wrong word or \"sound a like\" substitutions may have occurred due to the inherent limitations of voice recognition software. Read the chart carefully and recognize, using context, where substitutions have occurred.  "

## 2025-02-17 NOTE — ASSESSMENT & PLAN NOTE
Suspect multifactorial with alcohol abuse, rhabdomyolysis, hepatis steatosis   RUQ US with hepatomegaly, steatosis, patent major vessels  Appreciate GI consult  May be component of alcohol hepatitis, no steroids indicated   LFTs continue to improve   Trend

## 2025-02-17 NOTE — ASSESSMENT & PLAN NOTE
Likely due to prerenal azotemia and rhabdomyolysis with component of ATN  IV fluids discontinued 2/15/2025  Appreciate nephrology recs   For albumin 12.5 q8 x 3 again today

## 2025-02-17 NOTE — ASSESSMENT & PLAN NOTE
IV fluid discontinued. No appears hypervolemic   With ongoing GI losses and polyuria at risk for further depletion

## 2025-02-17 NOTE — ASSESSMENT & PLAN NOTE
Did have rectal tube earlier in hospitalization, since removed  Enteric panel negative, C diff negative. Pancreatitis elastase wnl. O&P negative. Fecal calprotectin elevated at 380  Increased to Imodium 4mg qid yesterday with some improvement  Continue florastor   Trial Lomotil today prn  Possibly consider bile acid sequestrants   Appreciate GI consult

## 2025-02-17 NOTE — ASSESSMENT & PLAN NOTE
Patient states stool is thickening up.  Continue Banatrol, probiotics, Imodium per gastroenterology

## 2025-02-17 NOTE — PHYSICAL THERAPY NOTE
PHYSICAL THERAPY NOTE          Patient Name: Emiliano Rodriguez  Today's Date: 2/17/2025 02/17/25 0810   PT Last Visit   PT Visit Date 02/17/25   Note Type   Note Type Treatment   Pain Assessment   Pain Assessment Tool 0-10   Pain Score 8   Pain Location/Orientation Orientation: Left;Location: Leg  (calf)   Pain Onset/Description Descriptor: Cramping;Other (Comment)  (with ambulation)   Restrictions/Precautions   Weight Bearing Precautions Per Order No   Other Precautions Fall Risk;Pain;Multiple lines;Bed Alarm;Chair Alarm   General   Chart Reviewed Yes   Response to Previous Treatment Patient reporting fatigue but able to participate.   Family/Caregiver Present No   Cognition   Overall Cognitive Status WFL   Arousal/Participation Alert;Cooperative   Attention Within functional limits   Orientation Level Oriented X4   Memory Within functional limits   Following Commands Follows all commands and directions without difficulty   Subjective   Subjective Reports he is doing better with his mobility.  c/o L calf pain with ambulation   Bed Mobility   Additional Comments OOB in chair start/end PT session   Transfers   Sit to Stand 5  Supervision   Additional items Armrests;Increased time required;Verbal cues   Stand to Sit 5  Supervision   Additional items Armrests;Increased time required;Verbal cues   Stand pivot 5  Supervision   Additional items Increased time required;Verbal cues   Toilet transfer 4  Minimal assistance   Additional items Assist x 1;Increased time required;Verbal cues;Commode   Additional Comments RW used. Stood with fair balance for clothing management and hygiene. Performed sit<>stand x 7 reps with cues for sequence and safety for initial transfers.   Ambulation/Elevation   Gait pattern Excessively slow;Short stride;Foward flexed;Decreased foot clearance;Improper Weight shift   Gait Assistance 4  Minimal assist   Additional  items Assist x 1;Verbal cues   Assistive Device Rolling walker   Distance 25' x 2   Balance   Static Sitting Fair +   Dynamic Sitting Fair +   Static Standing Fair   Dynamic Standing Fair -   Ambulatory Fair -  (RW)   Endurance Deficit   Endurance Deficit Yes   Activity Tolerance   Activity Tolerance Patient limited by fatigue;Patient limited by pain   Exercises   Hip Flexion Sitting;10 reps;AROM;Bilateral   Hip Abduction Sitting;10 reps;AROM;Bilateral   Hip Adduction Sitting;10 reps;AROM;Bilateral   Knee AROM Long Arc Quad Sitting;10 reps;AROM;Bilateral  (as tolerated)   Ankle Pumps Sitting;10 reps;AROM;Bilateral   Assessment   Prognosis Good   Problem List Decreased strength;Decreased endurance;Impaired balance;Decreased mobility;Decreased safety awareness   Assessment Pt seen this date for PT treatment session to increase level of mobility and functional activity tolerance. PT treatment session consisting of  therapeutic exercises, bed mobility, transfers and  gait training. Pt. Currently performing bed mobility, tx and ambulation at  SUP-min  x 1 level of function with use of RW . In comparison to previous session, Pt. With improvement  activity tolerance. Pt is in need of continued activity in PT to improve strength balance endurance mobility transfers and ambulation with return to maximize LOF. From PT/mobility standpoint, recommendation at time of d/c would be Level I:  maximum resource intensity  in order to promote return to PLOF and independence.  The patient's AM-PAC Basic Mobility Inpatient Short Form Raw Score is 19. A Raw score of greater than 16 suggests the patient may benefit from discharge to home. Please also refer to the recommendation of the Physical Therapist for safe discharge planning. Pt continues to be functioning below baseline level. PT will continue to see pt during current hospitalization in order to address the deficits listed above and provide interventions consistent w/ POC in effort  to achieve goals.   Goals   Patient Goals to get better   LTG Expiration Date 02/24/25   PT Treatment Day 5   Plan   Treatment/Interventions Functional transfer training;LE strengthening/ROM;Therapeutic exercise;Endurance training;Elevations;Bed mobility;Gait training   Progress Progressing toward goals   PT Frequency 3-5x/wk   Discharge Recommendation   Rehab Resource Intensity Level, PT I (Maximum Resource Intensity)   AM-PAC Basic Mobility Inpatient   Turning in Flat Bed Without Bedrails 4   Lying on Back to Sitting on Edge of Flat Bed Without Bedrails 3   Moving Bed to Chair 3   Standing Up From Chair Using Arms 4   Walk in Room 3   Climb 3-5 Stairs With Railing 2   Basic Mobility Inpatient Raw Score 19   Basic Mobility Standardized Score 42.48   [facility] Highest Level Of Mobility   -HLM Goal 6: Walk 10 steps or more   -HLM Achieved 7: Walk 25 feet or more   Education   Education Provided Mobility training   Patient Demonstrates verbal understanding;Reinforcement needed   End of Consult   Patient Position at End of Consult Bedside chair;Bed/Chair alarm activated;All needs within reach   End of Consult Comments discussed POC with PT

## 2025-02-17 NOTE — ASSESSMENT & PLAN NOTE
Endorses at least 5 beers a day  Folic acid, thiamine  Patient did not exhibit any signs of acute alcohol withdrawal, CIWA protocol was not needed, it has been discontinued.

## 2025-02-17 NOTE — CASE MANAGEMENT
Case Management Progress Note    Patient name Emiliano Rodriguez  Location /404-01 MRN [id number]  : 1975 Date 2025       LOS (days): 10  Geometric Mean LOS (GMLOS) (days): 4.7  Days to GMLOS:-5.5        OBJECTIVE:        Current admission status: Inpatient  Preferred Pharmacy:   [id number] [address] [address]  Phone: [phone] Fax: [phone]    Primary Care Provider: Emmett Jimenez DO    Primary Insurance: [employer]  Secondary Insurance:     PROGRESS NOTE:met with pt at bedside. Declining STR. [facility] secured. Pt will need transport home and needs assist with [address] home.

## 2025-02-17 NOTE — PROGRESS NOTES
Progress Note - Hospitalist   Name: Emiliano Rodriguez 49 y.o. male I MRN: [id number]  Unit/Bed#: 404-01 I Date of Admission: 2/7/2025   Date of Service: 2/17/2025 I Hospital Day: 10    Assessment & Plan  Rhabdomyolysis  With peak CK >20,000  Now less than 1000, no need to trend further  Monitor off further fluids. Noted with significant bilateral LE edema, for additional albumin today. Encouraged elevation and compression stockings    Acute renal failure (ARF) (HCC)  Likely due to prerenal azotemia and rhabdomyolysis with component of ATN  IV fluids discontinued 2/15/2025  Appreciate nephrology recs   For albumin 12.5 q8 x 3 again today   Diarrhea  Did have rectal tube earlier in hospitalization, since removed  Enteric panel negative, C diff negative. Pancreatitis elastase wnl. O&P negative. Fecal calprotectin elevated at 380  Increased to Imodium 4mg qid yesterday with some improvement  Continue florastor   Trial Lomotil today prn  Possibly consider bile acid sequestrants   Appreciate GI consult  Weakness of left lower extremity  MRI negative for acute CVA, note does report baseline L sided weakness from prior stroke  Muscle strength seems to be improving  Completed 5-days of IV thiamine. Continue with oral supplementation   Abnormal LFTs  Suspect multifactorial with alcohol abuse, rhabdomyolysis, hepatis steatosis   RUQ US with hepatomegaly, steatosis, patent major vessels  Appreciate GI consult  May be component of alcohol hepatitis, no steroids indicated   LFTs continue to improve   Trend   Gastroesophageal reflux disease without esophagitis  Protonix 40 mg daily  Alcohol abuse  Endorses at least 5 beers a day  Folic acid, thiamine  Patient did not exhibit any signs of acute alcohol withdrawal, CIWA protocol was not needed, it has been discontinued.  Hypokalemia  Monitor potassium level daily  Volume depletion  IV fluid discontinued. No appears hypervolemic   With ongoing GI losses and polyuria at risk for  "further depletion   Acute tubular necrosis (HCC)  Suspected component of ATN, nephrology consult appreciated.  Hypomagnesemia  IV magnesium as needed  Hypophosphatemia  Phosphorus level normal today    VTE Pharmacologic Prophylaxis:   heparin    Mobility:   Basic Mobility Inpatient Raw Score: 18  JH-HLM Goal: 6: Walk 10 steps or more  JH-HLM Achieved: 4: Move to chair/commode  JH-HLM Goal NOT achieved. Continue with multidisciplinary rounding and encourage appropriate mobility to improve upon JH-HLM goals.    Patient Centered Rounds: I performed bedside rounds with nursing staff today.   Discussions with Specialists or Other Care Team Provider: case management, nephro    Education and Discussions with Family / Patient: Patient declined call to .     Current Length of Stay: 10 day(s)  Current Patient Status: Inpatient   Certification Statement: The patient will continue to require additional inpatient hospital stay due to diarrhea, GUNJAN  Discharge Plan: Anticipate discharge in 24-48 hrs to home.    Code Status: Level 1 - Full Code    Subjective   Patient reports he continues with frequent diarrhea, however the stools are \"thickening up.\" Denies abdominal pain, N/V. Reports significant bilateral LE edema    Objective :  Temp:  [98.1 °F (36.7 °C)-98.7 °F (37.1 °C)] 98.1 °F (36.7 °C)  HR:  [79-95] 86  BP: (119-139)/() 119/80  Resp:  [17-18] 17  SpO2:  [94 %-98 %] 98 %  O2 Device: None (Room air)    Body mass index is 36.9 kg/m².     Input and Output Summary (last 24 hours):     Intake/Output Summary (Last 24 hours) at 2/17/2025 0902  Last data filed at 2/17/2025 0801  Gross per 24 hour   Intake 980 ml   Output 4850 ml   Net -3870 ml       Physical Exam  Constitutional:       General: He is not in acute distress.  HENT:      Head: Normocephalic and atraumatic.   Cardiovascular:      Rate and Rhythm: Regular rhythm.   Pulmonary:      Effort: Pulmonary effort is normal. No respiratory distress.      " Breath sounds: Normal breath sounds. No wheezing.   Abdominal:      General: Abdomen is flat. Bowel sounds are normal. There is no distension.      Palpations: Abdomen is soft.   Musculoskeletal:      Right lower leg: Edema (3+) present.      Left lower leg: Edema (3+) present.   Skin:     General: Skin is warm and dry.   Neurological:      General: No focal deficit present.      Mental Status: He is alert and oriented to person, place, and time.           Lines/Drains:              Lab Results: I have reviewed the following results:   Results from last 7 days   Lab Units 02/17/25  0442   WBC Thousand/uL 8.97   HEMOGLOBIN g/dL 8.6*   HEMATOCRIT % 26.9*   PLATELETS Thousands/uL 212   SEGS PCT % 75   LYMPHO PCT % 14   MONO PCT % 8   EOS PCT % 2     Results from last 7 days   Lab Units 02/17/25  0442   SODIUM mmol/L 137   POTASSIUM mmol/L 3.9   CHLORIDE mmol/L 105   CO2 mmol/L 23   BUN mg/dL 17   CREATININE mg/dL 3.07*   ANION GAP mmol/L 9   CALCIUM mg/dL 7.7*   ALBUMIN g/dL 2.5*   TOTAL BILIRUBIN mg/dL 1.45*   ALK PHOS U/L 100   ALT U/L 129*   AST U/L 90*   GLUCOSE RANDOM mg/dL 87     Results from last 7 days   Lab Units 02/16/25  0434   INR  1.09                   Recent Cultures (last 7 days):         Imaging Results Review: I reviewed radiology reports from this admission including: CT abdomen/pelvis, MRI brain, xray(s), and Ultrasound(s).  Other Study Results Review: No additional pertinent studies reviewed.    Last 24 Hours Medication List:     Current Facility-Administered Medications:     acetaminophen (TYLENOL) tablet 650 mg, Q6H PRN    clopidogrel (PLAVIX) tablet 75 mg, Daily    folic acid (FOLVITE) tablet 1 mg, Daily    heparin (porcine) subcutaneous injection 5,000 Units, Q8H DAVION **AND** [COMPLETED] Platelet count, Once    loperamide (IMODIUM) capsule 4 mg, 4x Daily    magnesium sulfate 2 g/50 mL IVPB (premix) 2 g, Once    metoprolol succinate (TOPROL-XL) 24 hr tablet 25 mg, Daily    moisture barrier  miconazole 2% cream (aka MELODY MOISTURE BARRIER ANTIFUNGAL CREAM), BID    nicotine (NICODERM CQ) 21 mg/24 hr TD 24 hr patch 1 patch, Daily    pantoprazole (PROTONIX) EC tablet 40 mg, Early Morning    saccharomyces boulardii (FLORASTOR) capsule 250 mg, BID    simethicone (MYLICON) chewable tablet 80 mg, Q6H PRN    traZODone (DESYREL) tablet 50 mg, HS PRN    Administrative Statements   Today, Patient Was Seen By: Taylor Champion PA-C      **Please Note: This note may have been constructed using a voice recognition system.**

## 2025-02-17 NOTE — ASSESSMENT & PLAN NOTE
Resolved however at risk for reoccurrence given polyuria and frequent stool.  Monitor I's/O closely and offer volume expansion if indicated

## 2025-02-17 NOTE — PLAN OF CARE
Problem: SKIN/TISSUE INTEGRITY - ADULT  Goal: Skin Integrity remains intact(Skin Breakdown Prevention)  Description: Assess:  -Perform Tani assessment every 2  -Clean and moisturize skin every incont. Episode   -Inspect skin when repositioning, toileting, and assisting with ADLS  -Assess extremities for adequate circulation and sensation     Bed Management:  -Have minimal linens on bed & keep smooth, unwrinkled  -Change linens as needed when moist or perspiring  -Avoid sitting or lying in one position for more than 2 hours while in bed  -Keep HOB at 30 degrees     Toileting:  -Offer bedside commode  -Assess for incontinence every shift  -Use incontinent care products after each incontinent episode such as alejandra wipes    Activity:  -Mobilize patient 3 times a day  -Encourage activity and walks on unit  -Encourage or provide ROM exercises   -Turn and reposition patient every 2 Hours  -Use appropriate equipment to lift or move patient in bed  -Instruct/ Assist with weight shifting every 60 mins when out of bed in chair  -Consider limitation of chair time 6 hour intervals    Skin Care:  -Avoid use of baby powder, tape, friction and shearing, hot water or constrictive clothing  -Relieve pressure over bony prominences using foam wedges/pillows  -Do not massage red bony areas    2/17/2025 0230 by Guerita Russell RN  Outcome: Progressing  2/17/2025 0047 by Guerita Russell RN  Outcome: Progressing     Problem: MUSCULOSKELETAL - ADULT  Goal: Maintain or return mobility to safest level of function  Description: INTERVENTIONS:  - Assess patient's ability to carry out ADLs; assess patient's baseline for ADL function and identify physical deficits which impact ability to perform ADLs (bathing, care of mouth/teeth, toileting, grooming, dressing, etc.)  - Assess/evaluate cause of self-care deficits   - Assess range of motion  - Assess patient's mobility  - Assess patient's need for assistive devices and provide as appropriate  -  Encourage maximum independence but intervene and supervise when necessary  - Involve family in performance of ADLs  - Assess for home care needs following discharge   - Consider OT consult to assist with ADL evaluation and planning for discharge  - Provide patient education as appropriate  2/17/2025 0230 by Guerita Russell RN  Outcome: Progressing  2/17/2025 0047 by Guerita Russell RN  Outcome: Progressing     Problem: PAIN - ADULT  Goal: Verbalizes/displays adequate comfort level or baseline comfort level  Description: Interventions:  - Encourage patient to monitor pain and request assistance  - Assess pain using appropriate pain scale  - Administer analgesics based on type and severity of pain and evaluate response  - Implement non-pharmacological measures as appropriate and evaluate response  - Consider cultural and social influences on pain and pain management  - Notify physician/advanced practitioner if interventions unsuccessful or patient reports new pain  2/17/2025 0230 by Guerita Russell RN  Outcome: Progressing  2/17/2025 0047 by Guerita Russell RN  Outcome: Progressing

## 2025-02-17 NOTE — ASSESSMENT & PLAN NOTE
With peak CK >20,000  Now less than 1000, no need to trend further  Monitor off further fluids. Noted with significant bilateral LE edema, for additional albumin today. Encouraged elevation and compression stockings

## 2025-02-17 NOTE — ASSESSMENT & PLAN NOTE
In setting of aggressive resuscitation earlier in admission and profound hypoalbuminemia.  Avoiding diuresis given polyuria and frequent stools.  Allow auto diuresis.  Attempt to maintain even balance.

## 2025-02-17 NOTE — PLAN OF CARE
Problem: PHYSICAL THERAPY ADULT  Goal: Performs mobility at highest level of function for planned discharge setting.  See evaluation for individualized goals.  Description: Treatment/Interventions: Functional transfer training, LE strengthening/ROM, Therapeutic exercise, Endurance training, Elevations, Bed mobility, Gait training          See flowsheet documentation for full assessment, interventions and recommendations.  Outcome: Progressing  Note: Prognosis: Good  Problem List: Decreased strength, Decreased endurance, Impaired balance, Decreased mobility, Decreased safety awareness  Assessment: Pt seen this date for PT treatment session to increase level of mobility and functional activity tolerance. PT treatment session consisting of  therapeutic exercises, bed mobility, transfers and  gait training. Pt. Currently performing bed mobility, tx and ambulation at  SUP-min  x 1 level of function with use of RW . In comparison to previous session, Pt. With improvement  activity tolerance. Pt is in need of continued activity in PT to improve strength balance endurance mobility transfers and ambulation with return to maximize LOF. From PT/mobility standpoint, recommendation at time of d/c would be Level I:  maximum resource intensity  in order to promote return to PLOF and independence.  The patient's AM-[facility] Basic Mobility Inpatient Short Form Raw Score is 19. A Raw score of greater than 16 suggests the patient may benefit from discharge to home. Please also refer to the recommendation of the Physical Therapist for safe discharge planning. Pt continues to be functioning below baseline level. PT will continue to see pt during current hospitalization in order to address the deficits listed above and provide interventions consistent w/ POC in effort to achieve goals.        Rehab Resource Intensity Level, PT: I (Maximum Resource Intensity)    See flowsheet documentation for full assessment.

## 2025-02-18 LAB
ALBUMIN SERPL BCG-MCNC: 2.9 G/DL (ref 3.5–5)
ALP SERPL-CCNC: 107 U/L (ref 34–104)
ALT SERPL W P-5'-P-CCNC: 111 U/L (ref 7–52)
ANION GAP SERPL CALCULATED.3IONS-SCNC: 10 MMOL/L (ref 4–13)
AST SERPL W P-5'-P-CCNC: 74 U/L (ref 13–39)
BASOPHILS # BLD AUTO: 0.05 THOUSANDS/ΜL (ref 0–0.1)
BASOPHILS NFR BLD AUTO: 1 % (ref 0–1)
BILIRUB SERPL-MCNC: 1.42 MG/DL (ref 0.2–1)
BUN SERPL-MCNC: 16 MG/DL (ref 5–25)
CALCIUM ALBUM COR SERPL-MCNC: 8.5 MG/DL (ref 8.3–10.1)
CALCIUM SERPL-MCNC: 7.6 MG/DL (ref 8.4–10.2)
CHLORIDE SERPL-SCNC: 106 MMOL/L (ref 96–108)
CO2 SERPL-SCNC: 23 MMOL/L (ref 21–32)
CREAT SERPL-MCNC: 2.88 MG/DL (ref 0.6–1.3)
EOSINOPHIL # BLD AUTO: 0.18 THOUSAND/ΜL (ref 0–0.61)
EOSINOPHIL NFR BLD AUTO: 2 % (ref 0–6)
ERYTHROCYTE [DISTWIDTH] IN BLOOD BY AUTOMATED COUNT: 14.7 % (ref 11.6–15.1)
GFR SERPL CREATININE-BSD FRML MDRD: 24 ML/MIN/1.73SQ M
GLUCOSE SERPL-MCNC: 98 MG/DL (ref 65–140)
HCT VFR BLD AUTO: 28.4 % (ref 36.5–49.3)
HGB BLD-MCNC: 9 G/DL (ref 12–17)
IMM GRANULOCYTES # BLD AUTO: 0.06 THOUSAND/UL (ref 0–0.2)
IMM GRANULOCYTES NFR BLD AUTO: 1 % (ref 0–2)
LYMPHOCYTES # BLD AUTO: 1.23 THOUSANDS/ΜL (ref 0.6–4.47)
LYMPHOCYTES NFR BLD AUTO: 15 % (ref 14–44)
MAGNESIUM SERPL-MCNC: 1.6 MG/DL (ref 1.9–2.7)
MCH RBC QN AUTO: 33.1 PG (ref 26.8–34.3)
MCHC RBC AUTO-ENTMCNC: 31.7 G/DL (ref 31.4–37.4)
MCV RBC AUTO: 104 FL (ref 82–98)
MONOCYTES # BLD AUTO: 0.58 THOUSAND/ΜL (ref 0.17–1.22)
MONOCYTES NFR BLD AUTO: 7 % (ref 4–12)
NEUTROPHILS # BLD AUTO: 6.34 THOUSANDS/ΜL (ref 1.85–7.62)
NEUTS SEG NFR BLD AUTO: 74 % (ref 43–75)
NRBC BLD AUTO-RTO: 0 /100 WBCS
PHOSPHATE SERPL-MCNC: 4.5 MG/DL (ref 2.7–4.5)
PLATELET # BLD AUTO: 229 THOUSANDS/UL (ref 149–390)
PMV BLD AUTO: 10.8 FL (ref 8.9–12.7)
POTASSIUM SERPL-SCNC: 3.7 MMOL/L (ref 3.5–5.3)
PROT SERPL-MCNC: 5.8 G/DL (ref 6.4–8.4)
RBC # BLD AUTO: 2.72 MILLION/UL (ref 3.88–5.62)
SODIUM SERPL-SCNC: 139 MMOL/L (ref 135–147)
WBC # BLD AUTO: 8.44 THOUSAND/UL (ref 4.31–10.16)

## 2025-02-18 PROCEDURE — 99232 SBSQ HOSP IP/OBS MODERATE 35: CPT

## 2025-02-18 PROCEDURE — 83735 ASSAY OF MAGNESIUM: CPT | Performed by: NURSE PRACTITIONER

## 2025-02-18 PROCEDURE — 85025 COMPLETE CBC W/AUTO DIFF WBC: CPT

## 2025-02-18 PROCEDURE — 80053 COMPREHEN METABOLIC PANEL: CPT | Performed by: NURSE PRACTITIONER

## 2025-02-18 PROCEDURE — 84100 ASSAY OF PHOSPHORUS: CPT | Performed by: NURSE PRACTITIONER

## 2025-02-18 PROCEDURE — 99232 SBSQ HOSP IP/OBS MODERATE 35: CPT | Performed by: NURSE PRACTITIONER

## 2025-02-18 PROCEDURE — 97530 THERAPEUTIC ACTIVITIES: CPT

## 2025-02-18 PROCEDURE — 99232 SBSQ HOSP IP/OBS MODERATE 35: CPT | Performed by: INTERNAL MEDICINE

## 2025-02-18 PROCEDURE — 97116 GAIT TRAINING THERAPY: CPT

## 2025-02-18 RX ORDER — MAGNESIUM SULFATE HEPTAHYDRATE 40 MG/ML
2 INJECTION, SOLUTION INTRAVENOUS ONCE
Status: COMPLETED | OUTPATIENT
Start: 2025-02-18 | End: 2025-02-19

## 2025-02-18 RX ADMIN — CLOPIDOGREL 75 MG: 75 TABLET ORAL at 09:47

## 2025-02-18 RX ADMIN — HEPARIN SODIUM 5000 UNITS: 5000 INJECTION, SOLUTION INTRAVENOUS; SUBCUTANEOUS at 14:30

## 2025-02-18 RX ADMIN — METOPROLOL SUCCINATE 25 MG: 25 TABLET, EXTENDED RELEASE ORAL at 09:47

## 2025-02-18 RX ADMIN — LOPERAMIDE HYDROCHLORIDE 4 MG: 2 CAPSULE ORAL at 18:41

## 2025-02-18 RX ADMIN — HEPARIN SODIUM 5000 UNITS: 5000 INJECTION, SOLUTION INTRAVENOUS; SUBCUTANEOUS at 22:20

## 2025-02-18 RX ADMIN — MAGNESIUM SULFATE HEPTAHYDRATE 2 G: 40 INJECTION, SOLUTION INTRAVENOUS at 13:16

## 2025-02-18 RX ADMIN — LOPERAMIDE HYDROCHLORIDE 4 MG: 2 CAPSULE ORAL at 09:47

## 2025-02-18 RX ADMIN — PANTOPRAZOLE SODIUM 40 MG: 40 TABLET, DELAYED RELEASE ORAL at 05:01

## 2025-02-18 RX ADMIN — LOPERAMIDE HYDROCHLORIDE 4 MG: 2 CAPSULE ORAL at 22:19

## 2025-02-18 RX ADMIN — Medication 250 MG: at 18:42

## 2025-02-18 RX ADMIN — LOPERAMIDE HYDROCHLORIDE 4 MG: 2 CAPSULE ORAL at 12:03

## 2025-02-18 RX ADMIN — MICONAZOLE NITRATE: 20 CREAM TOPICAL at 18:43

## 2025-02-18 RX ADMIN — Medication 250 MG: at 09:46

## 2025-02-18 RX ADMIN — MICONAZOLE NITRATE: 20 CREAM TOPICAL at 09:48

## 2025-02-18 RX ADMIN — FOLIC ACID 1 MG: 1 TABLET ORAL at 09:47

## 2025-02-18 RX ADMIN — NICOTINE 1 PATCH: 21 PATCH, EXTENDED RELEASE TRANSDERMAL at 09:46

## 2025-02-18 RX ADMIN — HEPARIN SODIUM 5000 UNITS: 5000 INJECTION, SOLUTION INTRAVENOUS; SUBCUTANEOUS at 05:02

## 2025-02-18 RX ADMIN — ALBUMIN (HUMAN) 12.5 G: 0.25 INJECTION, SOLUTION INTRAVENOUS at 05:01

## 2025-02-18 NOTE — PLAN OF CARE
Problem: PHYSICAL THERAPY ADULT  Goal: Performs mobility at highest level of function for planned discharge setting.  See evaluation for individualized goals.  Description: Treatment/Interventions: Functional transfer training, LE strengthening/ROM, Therapeutic exercise, Endurance training, Elevations, Bed mobility, Gait training          See flowsheet documentation for full assessment, interventions and recommendations.  Outcome: Progressing  Note: Prognosis: Good  Problem List: Decreased strength, Decreased endurance, Impaired balance, Decreased mobility, Decreased safety awareness  Assessment: Pt seen this date for PT treatment session to increase level of mobility and functional activity tolerance. PT treatment session consisting of  therapeutic exercises, bed mobility, transfers and  gait training. Pt. Currently performing bed mobility, tx and ambulation at  mod I-min  x 1 level of function with use of RW . In comparison to previous session, Pt. With improvement  activity tolerance. Pt is in need of continued activity in PT to improve strength balance endurance mobility transfers and ambulation with return to maximize LOF. From PT/mobility standpoint, recommendation at time of d/c would be Level II:  moderate resource intensity  in order to promote return to PLOF and independence.  The patient's AM-[facility] Basic Mobility Inpatient Short Form Raw Score is 19. A Raw score of greater than 16 suggests the patient may benefit from discharge to home. Please also refer to the recommendation of the Physical Therapist for safe discharge planning. Pt continues to be functioning below baseline level. PT will continue to see pt during current hospitalization in order to address the deficits listed above and provide interventions consistent w/ POC in effort to achieve goals.        Rehab Resource Intensity Level, PT: II (Moderate Resource Intensity)    See flowsheet documentation for full assessment.

## 2025-02-18 NOTE — CASE MANAGEMENT
Case Management Discharge Planning Note    Patient name Emiliano Rodriguez  Location /404-01 MRN [id number]  : 1975 Date 2025       Current Admission Date: 2025  Current Admission Diagnosis:Rhabdomyolysis   Patient Active Problem List    Diagnosis Date Noted Date Diagnosed    Bilateral lower extremity edema 2025     Electrolyte imbalance 2025     Hypomagnesemia 2025     Hypophosphatemia 2025     Weakness of left lower extremity 2025     Hypokalemia 2025     Volume depletion 2025     Acute tubular necrosis (HCC) 2025     Acute renal failure (ARF) (HCC) 2025     Rhabdomyolysis 2025     Abnormal LFTs 2025     Diarrhea 2025     Steatosis of liver 2025     Hepatomegaly 2025     CVA (cerebral vascular accident) (HCC) 2018     Gastroesophageal reflux disease without esophagitis 2018     TIA (transient ischemic attack) 2018     Alcohol abuse 2018     Tobacco abuse 2018       LOS (days): 11  Geometric Mean LOS (GMLOS) (days): 4.7  Days to GMLOS:-6.6     OBJECTIVE:  Risk of Unplanned Readmission Score: 17.43         Current admission status: Inpatient   Preferred Pharmacy:   [id number] [address] [address]  Phone: [phone] Fax: [phone]    Primary Care Provider: Emmett Jimenez DO    Primary Insurance: [employer]  Secondary Insurance:     DISCHARGE DETAILS:    Discharge planning discussed with:: patient        CM contacted family/caregiver?: No- see comments (declined)  Were Treatment Team discharge recommendations reviewed with patient/caregiver?: Yes  Did patient/caregiver verbalize understanding of patient care needs?: Yes  Were patient/caregiver advised of the risks associated with not following Treatment Team discharge recommendations?: Yes    Contacts  Contact Method: In  Person  Reason/Outcome: Discharge Planning      Treatment Team Recommendation: Short Term Rehab (pt declined)  Discharge Destination Plan:: Home with Home Health Care  Transport at Discharge : [facility]     Number/Name of Dispatcher: via roundtrip  Transported by (Company and Unit #): [employer]  ETA of Transport (Date): 02/19/25  ETA of Transport (Time): 1600   Tentative discharge home tomorrow. Pt requesting transport home. Will need to submit to  for transport once discharge confirmed.

## 2025-02-18 NOTE — ASSESSMENT & PLAN NOTE
Limited labs to review in steady state with baseline creatinine appears to be around 0.9 mg/dL.  Peak creatinine 4.8 mg/dL on 2/10-> 3.1 mg/dL today.  UA significant for large blood, 2+ protein.  Renal ultrasound without hydronephrosis.  Etiology of GUNJAN severe prerenal state in addition to rhabdomyolysis.  Fluids discontinued 2/15.  Given albumin over the weekend and yesterday.    Suspected polyuric phase of ATN.  Urine output less than yesterday.  Continues to have significant peripheral edema.  Recommend holding off on crystalloid/colloid/diuretic and allowing auto diuresis.  Patient agreeable to plan.  Tentative discharge tomorrow

## 2025-02-18 NOTE — ASSESSMENT & PLAN NOTE
Improving    Continue Imodium as ordered and titrate per symptom relief.  Continue Banatrol supplement/probiotics.  Continue to maintain a large bore IV.  Continue to monitor hemoglobin and transfuse as per protocol.   Continue to monitor stool output for any overt signs of GI bleeding.   Continue with serial abdominal exams.     Will follow-up outpatient regarding any outpatient procedures.

## 2025-02-18 NOTE — ASSESSMENT & PLAN NOTE
Likely due to prerenal azotemia and rhabdomyolysis with component of ATN  IV fluids discontinued 2/15/2025  [facility] nephrology recs

## 2025-02-18 NOTE — ASSESSMENT & PLAN NOTE
Did have rectal tube earlier in hospitalization, since removed  Enteric panel negative, C diff negative. Pancreatitis elastase wnl. O&P negative. 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

## 2025-02-18 NOTE — CASE MANAGEMENT
Case Management Discharge Planning Note    Patient name Emiliano Rodriguez  Location /404-01 MRN [id number]  : 1975 Date 2025       Current Admission Date: 2025  Current Admission Diagnosis:Rhabdomyolysis   Patient Active Problem List    Diagnosis Date Noted Date Diagnosed    Bilateral lower extremity edema 2025     Electrolyte imbalance 2025     Hypomagnesemia 2025     Hypophosphatemia 2025     Weakness of left lower extremity 2025     Hypokalemia 2025     Volume depletion 2025     Acute tubular necrosis (HCC) 2025     Acute renal failure (ARF) (HCC) 2025     Rhabdomyolysis 2025     Abnormal LFTs 2025     Diarrhea 2025     Steatosis of liver 2025     Hepatomegaly 2025     CVA (cerebral vascular accident) (HCC) 2018     Gastroesophageal reflux disease without esophagitis 2018     TIA (transient ischemic attack) 2018     Alcohol abuse 2018     Tobacco abuse 2018       LOS (days): 11  Geometric Mean LOS (GMLOS) (days): 4.7  Days to GMLOS:-6.7     OBJECTIVE:  Risk of Unplanned Readmission Score: 17.65         Current admission status: Inpatient   Preferred Pharmacy:   [id number] [address] [address]  Phone: [phone] Fax: [phone]    Primary Care Provider: Emmett Jimenez DO    Primary Insurance: [employer]  Secondary Insurance:     DISCHARGE DETAILS:    Discharge planning discussed with:: patient        CM contacted family/caregiver?: No- see comments (declined)  Were Treatment Team discharge recommendations reviewed with patient/caregiver?: Yes  Did patient/caregiver verbalize understanding of patient care needs?: Yes  Were patient/caregiver advised of the risks associated with not following Treatment Team discharge recommendations?: Yes    Contacts  Contact Method: In  If provider orders tests at today's visit, patient would like to be contacted via Either Telephone OR Mychart.  If to contact patient by phone, patient's preferred phone # is [phone] (Cell) and it is OK to leave message on voice mail or with family member.  If medications are ordered at today's visit, the pharmacy name/location patient would like them to be sent to is   [id number] - [place], [place] - [address]   [address]  [facility] [address]  Phone: [phone] Fax: [phone]       Reviewed with Dr. Holliday; to discuss with Dr. Nix who is currently on ICU service.  I will review with Dr. Nix upon his return next week.  Please update pt.     Person  Reason/Outcome: Discharge Planning         DME Referral Provided  Referral made for DME?: Yes  DME referral completed for the following items:: Shower Chair  DME Supplier Name:: [employer]              Treatment Team Recommendation: Short Term Rehab (pt declined)  Discharge Destination Plan:: Home with Home Health Care  [facility] at [facility]charge [facility]     Number/Name of Dispatcher: via roundtrip  Transported by (Company and Unit #): [employer] [facility]  ETA of Transport (Date): 02/19/25  ETA of Transport (Time): 1600   Met with pt at bedside to discuss tentative discharge home tomorrow. Pt requesting shower chair. Placed order in parachute. Instructed pt that if approved will be delivered to home. Pt is agreeable with 1600 transport tomorrow.

## 2025-02-18 NOTE — PROGRESS NOTES
Progress Note - Hospitalist   Name: Emiliano Rodriguez 49 y.o. male I MRN: [id number]  Unit/Bed#: 404-01 I Date of Admission: 2/7/2025   Date of Service: 2/18/2025 I Hospital Day: 11    Assessment & Plan  Rhabdomyolysis  With peak CK >20,000  Now less than 1000, no need to trend further  Monitor off further fluids & further albumin. Encouraged elevation and compression stockings    Acute renal failure (ARF) (HCC)  Likely due to prerenal azotemia and rhabdomyolysis with component of ATN  IV fluids discontinued 2/15/2025  Appreciate nephrology recs   Diarrhea  Did have rectal tube earlier in hospitalization, since removed  Enteric panel negative, C diff negative. Pancreatitis elastase wnl. O&P negative. 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 Continue with oral supplementation   Abnormal LFTs  Suspect multifactorial with alcohol abuse, rhabdomyolysis, hepatis steatosis   RUQ US with hepatomegaly, steatosis, patent major vessels  Appreciate GI consult  May be component of alcohol hepatitis, no steroids indicated   LFTs continue to improve   Trend   Gastroesophageal reflux disease without esophagitis  Protonix 40 mg daily  Alcohol abuse  Endorses at least 5 beers a day  Folic acid, thiamine  Patient did not exhibit any signs of acute alcohol withdrawal, CIWA protocol was not needed, it has been discontinued.  Hypokalemia  Monitor potassium level daily  Volume depletion  IV fluid discontinued. No appears hypervolemic   With ongoing GI losses and polyuria at risk for further  depletion   Acute tubular necrosis (HCC)  Suspected component of ATN, nephrology consult appreciated.  Hypomagnesemia  IV magnesium as needed  Hypophosphatemia  Phosphorus level normal today  Bilateral lower extremity edema    Electrolyte imbalance      VTE Pharmacologic Prophylaxis:   heparin    Mobility:   Basic Mobility Inpatient Raw Score: 20  -HLM Goal: 6: Walk 10 steps or more  JH-HLM Achieved: 5: Stand (1 or more minutes)  JH-HLM Goal NOT achieved. Continue with multidisciplinary rounding and encourage appropriate mobility to improve upon JH-HLM goals.    Patient Centered Rounds: I performed bedside rounds with nursing staff today.   Discussions with Specialists or Other Care Team Provider: case management, nephro, GI    Education and Discussions with Family / Patient: Patient declined call to .     Current Length of Stay: 11 day(s)  Current Patient Status: Inpatient   Certification Statement: The patient will continue to require additional inpatient hospital stay due to diarrhea, monitoring renal function  Discharge Plan: Anticipate discharge tomorrow to home with home services.    Code Status: Level 1 - Full Code    Subjective   Patient reports significant improvement in ambulation compared to prior. Reports the diarrhea is becoming more firm and less frequently.  No nausea or vomiting. No chest pain or shortness of breath.     Objective :  Temp:  [98.1 °F (36.7 °C)-98.3 °F (36.8 °C)] 98.1 °F (36.7 °C)  HR:  [83-88] 84  BP: (128-132)/(80-89) 132/89  Resp:  [18-20] 20  SpO2:  [92 %-97 %] 97 %  O2 Device: None (Room air)    Body mass index is 34.73 kg/m².     Input and Output Summary (last 24 hours):     Intake/Output Summary (Last 24 hours) at 2/18/2025 1117  Last data filed at 2/18/2025 0501  Gross per 24 hour   Intake 1680 ml   Output 3225 ml   Net -1545 ml       Physical Exam  Constitutional:       General: He is not in acute distress.  HENT:      Head: Normocephalic and atraumatic.    Cardiovascular:      Rate and Rhythm: Normal rate and regular rhythm.   Pulmonary:      Effort: Pulmonary effort is normal.      Breath sounds: Normal breath sounds.   Abdominal:      General: Abdomen is flat. Bowel sounds are normal.      Palpations: Abdomen is soft.      Tenderness: There is no abdominal tenderness. There is no guarding.   Musculoskeletal:      Right lower leg: Edema present.      Left lower leg: Edema present.   Skin:     General: Skin is warm and dry.   Neurological:      General: No focal deficit present.      Mental Status: He is alert and oriented to person, place, and time.           Lines/Drains:              Lab Results: I have reviewed the following results:   Results from last 7 days   Lab Units 02/18/25  0443   WBC Thousand/uL 8.44   HEMOGLOBIN g/dL 9.0*   HEMATOCRIT % 28.4*   PLATELETS Thousands/uL 229   SEGS PCT % 74   LYMPHO PCT % 15   MONO PCT % 7   EOS PCT % 2     Results from last 7 days   Lab Units 02/18/25  0443   SODIUM mmol/L 139   POTASSIUM mmol/L 3.7   CHLORIDE mmol/L 106   CO2 mmol/L 23   BUN mg/dL 16   CREATININE mg/dL 2.88*   ANION GAP mmol/L 10   CALCIUM mg/dL 7.6*   ALBUMIN g/dL 2.9*   TOTAL BILIRUBIN mg/dL 1.42*   ALK PHOS U/L 107*   ALT U/L 111*   AST U/L 74*   GLUCOSE RANDOM mg/dL 98     Results from last 7 days   Lab Units 02/16/25  0434   INR  1.09                   Recent Cultures (last 7 days):         Imaging Results Review: I reviewed radiology reports from this admission including: CT abdomen/pelvis, xray(s), and Ultrasound(s).  Other Study Results Review: No additional pertinent studies reviewed.    Last 24 Hours Medication List:     Current Facility-Administered Medications:     acetaminophen (TYLENOL) tablet 650 mg, Q6H PRN    clopidogrel (PLAVIX) tablet 75 mg, Daily    diphenoxylate-atropine (LOMOTIL) 2.5-0.025 mg per tablet 1 tablet, 4x Daily PRN    folic acid (FOLVITE) tablet 1 mg, Daily    heparin (porcine) subcutaneous injection 5,000 Units, Q8H DAVION  **AND** [COMPLETED] Platelet count, Once    loperamide (IMODIUM) capsule 4 mg, 4x Daily    metoprolol succinate (TOPROL-XL) 24 hr tablet 25 mg, Daily    moisture barrier miconazole 2% cream (aka MELODY MOISTURE BARRIER ANTIFUNGAL CREAM), BID    nicotine (NICODERM CQ) 21 mg/24 hr TD 24 hr patch 1 patch, Daily    pantoprazole (PROTONIX) EC tablet 40 mg, Early Morning    saccharomyces boulardii (FLORASTOR) capsule 250 mg, BID    simethicone (MYLICON) chewable tablet 80 mg, Q6H PRN    traZODone (DESYREL) tablet 50 mg, HS PRN    Administrative Statements   Today, Patient Was Seen By: Taylor Champion PA-C      **Please Note: This note may have been constructed using a voice recognition system.**

## 2025-02-18 NOTE — PROGRESS NOTES
Progress Note - Gastroenterology   Name: Emiliano Rodriguez 49 y.o. male I MRN: [id number]  Unit/Bed#: 404-01 I Date of Admission: 2/7/2025   Date of Service: 2/18/2025 I Hospital Day: 11    Assessment & Plan  Abnormal LFTs  Improving    Continue to monitor LFTs as they are slowly trending downward.  Consider 24-hour urine copper study on outpatient basis.  Continue to avoid hepatotoxins.  Continue with alcohol cessation/abstinence.  Can continue current diet as ordered.  Alcohol abuse  Reinforced complete complete alcohol cessation.   Rhabdomyolysis  Per primary team.   Diarrhea  Improving    Continue Imodium as ordered and titrate per symptom relief.  Continue Banatrol supplement/probiotics.  Continue to maintain a large bore IV.  Continue to monitor hemoglobin and transfuse as per protocol.   Continue to monitor stool output for any overt signs of GI bleeding.   Continue with serial abdominal exams.     Will follow-up outpatient regarding any outpatient procedures.  Gastroesophageal reflux disease without esophagitis  Continue PPI as ordered.   Continue PRN antiemetics as ordered.   Acute renal failure (ARF) (HCC)  Per primary team and Nephrology.     Continue rest of medications as per primary team.   Continue supportive care.     I have discussed the above management plan in detail with the primary service.   Gastroenterology service will follow.    Subjective     49-year-old male who is currently being seen for his elevated LFTs, diarrhea, GERD symptoms, and alcohol abuse.  The patient reports that he feels there has been definite improvement in his symptoms over the last day or 2 as his bowel movements are becoming more formed and with minimal to no incontinence.  His last bowel movement was early this morning without any evidence of blood or melena.  He currently denies any nausea, vomiting, GERD symptoms, or abdominal pain.      Objective :  Temp:  [98.1 °F (36.7 °C)-98.3 °F (36.8 °C)] 98.1 °F (36.7 °C)  HR:   [83-88] 84  BP: (128-132)/(80-89) 132/89  Resp:  [18-20] 20  SpO2:  [92 %-97 %] 97 %  O2 Device: None (Room air)    Physical Exam  Exam: Pt was resting in bed comfortably during today's exam, A&O x 3, pleasant and cooperative. Abdomen is soft distended, nontender, with hypoactive bowel sounds x 4. Skin is non-icteric.  +1 pitting noted of the b/l lower extremities upon exam today.       Lab Results: I have reviewed the following results:CBC/BMP:   .     02/18/25  0443   WBC 8.44   HGB 9.0*   HCT 28.4*      SODIUM 139   K 3.7      CO2 23   BUN 16   CREATININE 2.88*   GLUC 98   MG 1.6*   PHOS 4.5    , LFTs:   .     02/18/25  0443   AST 74*   *   ALB 2.9*   TBILI 1.42*   ALKPHOS 107*        Imaging Results Review: No pertinent imaging studies reviewed.

## 2025-02-18 NOTE — ASSESSMENT & PLAN NOTE
With peak CK >20,000  Now less than 1000, no need to trend further  Monitor off further fluids & further albumin. Encouraged elevation and compression stockings

## 2025-02-18 NOTE — PROGRESS NOTES
[facility]  Progress Note   Emiliano Rodriguez 49 y.o. male MRN: [id number]  Unit/Bed#: 404-01 Encounter: [id number]      Brief summary of hospitalization: 49-year-old man with EtOH, GERD, smoker admitted 2/7 being treated for ATN GUNJAN in setting of rhabdomyolysis, volume depleted state, diarrhea.     Assessment & Plan  Acute renal failure (ARF) (HCC)  Limited labs to review in steady state with baseline creatinine appears to be around 0.9 mg/dL.  Peak creatinine 4.8 mg/dL on 2/10-> 3.1 mg/dL today.  UA significant for large blood, 2+ protein.  Renal ultrasound without hydronephrosis.  Etiology of GUNJAN severe prerenal state in addition to rhabdomyolysis.  Fluids discontinued 2/15.  Given albumin over the weekend and yesterday.    Suspected polyuric phase of ATN.  Urine output less than yesterday.  Continues to have significant peripheral edema.  Recommend holding off on crystalloid/colloid/diuretic and allowing auto diuresis.  Patient agreeable to plan.  Tentative discharge tomorrow        Rhabdomyolysis  Total CK less than 1000, no need to trend  Bilateral lower extremity edema  Iatrogenic from resuscitation and rhabdomyolysis.  Allow auto diuresis  Electrolyte imbalance  Receiving IV magnesium.  Potassium and phosphorus trends appropriate  Volume depletion  Resolved  Diarrhea  Improving       I have reviewed the nephrology recommendations including monitor off crystalloid/colloid/diuretics and can tentatively be discharged tomorrow with Taylor, and we are in agreement with renal plan including the information outlined above.    SUBJECTIVE / 24H INTERVAL HISTORY:  Examined sitting upright in chair.  States he feels improved and can stand and walk around without dizziness or weakness.  Continues to complain of lower extremity swelling.    Historical Information   Past Medical History:   Diagnosis Date    GERD (gastroesophageal reflux disease)     TIA (transient ischemic attack)   "    History reviewed. No pertinent surgical history.  Social History   Social History     Substance and Sexual Activity   Alcohol Use Yes    Alcohol/week: 42.0 standard drinks of alcohol    Types: 42 Cans of beer per week    Comment: 6 pack a day     Social History     Substance and Sexual Activity   Drug Use No     Social History     Tobacco Use   Smoking Status Every Day    Current packs/day: 2.00    Average packs/day: 2.0 packs/day for 25.0 years (50.0 ttl pk-yrs)    Types: Cigarettes   Smokeless Tobacco Never       Family History:   Family History   Problem Relation Age of Onset    No Known Problems Mother     No Known Problems Father        Medications:  Pertinent medications were reviewed  Current Facility-Administered Medications   Medication Dose Route Frequency Provider Last Rate    acetaminophen  650 mg Oral Q6H PRN Prudencio Nix MD      clopidogrel  75 mg Oral Daily Ankita Katz MD      diphenoxylate-atropine  1 tablet Oral 4x Daily PRN Taylor Champion PA-C      folic acid  1 mg Oral Daily Ankita Katz MD      heparin (porcine)  5,000 Units Subcutaneous Q8H DAVION Ankita Katz MD      loperamide  4 mg Oral 4x Daily Isaías Eller DO      magnesium sulfate  2 g Intravenous Once Agueda Nuno, CRNP 2 g (02/18/25 1316)    metoprolol succinate  25 mg Oral Daily Ankita Katz MD      MELODY ANTIFUNGAL   Topical BID Isaías Eller DO      nicotine  1 patch Transdermal Daily Ankita Katz MD      pantoprazole  40 mg Oral Early Morning Ankita Katz MD      saccharomyces boulardii  250 mg Oral BID Ankita Katz MD      simethicone  80 mg Oral Q6H PRN Isaías Eller DO      traZODone  50 mg Oral HS PRN Garcia Escobar MD           No Known Allergies      Vitals:   /85   Pulse 85   Temp 97.9 °F (36.6 °C)   Resp 18   Ht 5' 6\" (1.676 m)   Wt 97.6 kg (215 lb 2.7 oz)   SpO2 94%   BMI 34.73 kg/m²   Body mass index is 34.73 kg/m².  SpO2: 94 %,   SpO2 Activity: At Rest, " "  O2 Device: None (Room air)      Intake/Output Summary (Last 24 hours) at 2/18/2025 1553  Last data filed at 2/18/2025 1425  Gross per 24 hour   Intake 2160 ml   Output 2750 ml   Net -590 ml     Invasive Devices       Peripheral Intravenous Line  Duration             Peripheral IV 02/18/25 Dorsal (posterior);Right Forearm <1 day                    Physical Exam  General: conscious, cooperative, in no acute distress  Eyes: conjunctivae pink, anicteric sclerae  ENT: lips and mucous membranes moist  Neck: supple, no JVD, no masses  Chest: Diminished breath sounds bilaterally, no crackles, ronchus or wheezings  CVS: S1 & S2, normal rate, regular rhythm  Abdomen: soft, non-tender, non-distended, normoactive bowel sounds  Extremities: severe edema of both legs  Skin: no rash  Neuro: awake, alert, oriented    Diagnostic Data:  Lab: I have personally reviewed pertinent lab results.  CBC:  Results from last 7 days   Lab Units 02/18/25  0443   WBC Thousand/uL 8.44   HEMOGLOBIN g/dL 9.0*   HEMATOCRIT % 28.4*   PLATELETS Thousands/uL 229      CMP:   Lab Results   Component Value Date    SODIUM 139 02/18/2025    K 3.7 02/18/2025     02/18/2025    CO2 23 02/18/2025    BUN 16 02/18/2025    CREATININE 2.88 (H) 02/18/2025    CALCIUM 7.6 (L) 02/18/2025    AST 74 (H) 02/18/2025     (H) 02/18/2025    ALKPHOS 107 (H) 02/18/2025    EGFR 24 02/18/2025   ,   PT/INR: No results found for: \"PT\", \"INR\",   Magnesium: No components found for: \"MAG\",  Phosphorous:   Lab Results   Component Value Date    PHOS 4.5 02/18/2025       Microbiology:  @LABJUAN,(urinecx:7)@        LAURIE Manley    Portions of the record may have been created with voice recognition software. Occasional wrong word or \"sound a like\" substitutions may have occurred due to the inherent limitations of voice recognition software. Read the chart carefully and recognize, using context, where substitutions have occurred.  "

## 2025-02-18 NOTE — PHYSICAL THERAPY NOTE
"                                                                                  PHYSICAL THERAPY NOTE          Patient Name: Emiliano Rodriguez  Today's Date: 2/18/2025 02/18/25 0830   PT Last Visit   PT Visit Date 02/18/25   Note Type   Note Type Treatment   Pain Assessment   Pain Assessment Tool 0-10   Pain Score 2   Pain Location/Orientation Orientation: Left;Location: Leg  (calf)   Restrictions/Precautions   Weight Bearing Precautions Per Order No   Other Precautions Fall Risk;Pain;Multiple lines;Bed Alarm;Chair Alarm   General   Chart Reviewed Yes   Response to Previous Treatment Patient reporting fatigue but able to participate.   Family/Caregiver Present No   Cognition   Overall Cognitive Status WFL   Arousal/Participation Alert;Cooperative   Attention Within functional limits   Orientation Level Oriented X4   Memory Within functional limits   Following Commands Follows all commands and directions without difficulty   Subjective   Subjective \"My feet are swollen\" Decreased pain LE's. Reports he is unable to ambulate up his [place] his home, no railing.   Bed Mobility   Supine to Sit 6  Modified independent   Additional items HOB elevated;Bedrails;Increased time required   Additional Comments Increased time to transition to EOB. Sat EOB with fair+ sitting balance.   Transfers   Sit to Stand 5  Supervision   Additional items Bedrails;Increased time required;Verbal cues   Stand to Sit 5  Supervision   Additional items Armrests;Increased time required;Verbal cues   Stand pivot 5  Supervision   Additional items Increased time required;Verbal cues   Additional Comments RW used.   Ambulation/Elevation   Gait pattern Excessively slow;Short stride;Foward flexed;Decreased foot clearance;Improper Weight shift   Gait Assistance 4  Minimal assist   Additional items Assist x 1;Verbal cues   Assistive Device Rolling walker   Distance 50'   Balance   Static Sitting Fair +   Dynamic Sitting Fair +   Static Standing Fair "   Dynamic Standing Fair   Ambulatory Fair -  (RW)   Endurance Deficit   Endurance Deficit Yes   Activity Tolerance   Activity Tolerance Patient limited by fatigue;Patient limited by pain   Assessment   Prognosis Good   Problem List Decreased strength;Decreased endurance;Impaired balance;Decreased mobility;Decreased safety awareness   Assessment Pt seen this date for PT treatment session to increase level of mobility and functional activity tolerance. PT treatment session consisting of  therapeutic exercises, bed mobility, transfers and  gait training. Pt. Currently performing bed mobility, tx and ambulation at  mod I-min  x 1 level of function with use of RW . In comparison to previous session, Pt. With improvement  activity tolerance. Pt is in need of continued activity in PT to improve strength balance endurance mobility transfers and ambulation with return to maximize LOF. From PT/mobility standpoint, recommendation at time of d/c would be Level II:  moderate resource intensity  in order to promote return to PLOF and independence.  The patient's AM-[facility] Basic Mobility Inpatient Short Form Raw Score is 19. A Raw score of greater than 16 suggests the patient may benefit from discharge to home. Please also refer to the recommendation of the Physical Therapist for safe discharge planning. Pt continues to be functioning below baseline level. PT will continue to see pt during current hospitalization in order to address the deficits listed above and provide interventions consistent w/ POC in effort to achieve goals.   Goals   Patient Goals to get better   LTG Expiration Date 02/24/25   PT Treatment Day 6   Plan   Treatment/Interventions Functional transfer training;LE strengthening/ROM;Therapeutic exercise;Endurance training;Elevations;Bed mobility;Gait training   Progress Progressing toward goals   PT Frequency 3-5x/wk   Discharge Recommendation   Rehab Resource Intensity Level, PT II (Moderate Resource Intensity)    AM-PAC Basic Mobility Inpatient   Turning in Flat Bed Without Bedrails 4   Lying on Back to Sitting on Edge of Flat Bed Without Bedrails 3   Moving Bed to Chair 3   Standing Up From Chair Using Arms 4   Walk in Room 3   Climb 3-5 Stairs With Railing 2   Basic Mobility Inpatient Raw Score 19   Basic Mobility Standardized Score 42.48   [facility] Highest Level Of Mobility   -HLM Goal 6: Walk 10 steps or more   -HLM Achieved 7: Walk 25 feet or more   Education   Education Provided Mobility training   Patient Demonstrates verbal understanding;Reinforcement needed   End of Consult   Patient Position at End of Consult Bedside chair;Bed/Chair alarm activated;All needs within reach   End of Consult Comments discussed POC with PT

## 2025-02-18 NOTE — PLAN OF CARE
Problem: SKIN/TISSUE INTEGRITY - ADULT  Goal: Skin Integrity remains intact(Skin Breakdown Prevention)  Description: Assess:  -Perform Tani assessment every 2  -Clean and moisturize skin every incont. Episode   -Inspect skin when repositioning, toileting, and assisting with ADLS  -Assess extremities for adequate circulation and sensation     Bed Management:  -Have minimal linens on bed & keep smooth, unwrinkled  -Change linens as needed when moist or perspiring  -Avoid sitting or lying in one position for more than 2 hours while in bed  -Keep HOB at 30 degrees     Toileting:  -Offer bedside commode  -Assess for incontinence every shift  -Use incontinent care products after each incontinent episode such as alejandra wipes    Activity:  -Mobilize patient 3 times a day  -Encourage activity and walks on unit  -Encourage or provide ROM exercises   -Turn and reposition patient every 2 Hours  -Use appropriate equipment to lift or move patient in bed  -Instruct/ Assist with weight shifting every 60 mins when out of bed in chair  -Consider limitation of chair time 6 hour intervals    Skin Care:  -Avoid use of baby powder, tape, friction and shearing, hot water or constrictive clothing  -Relieve pressure over bony prominences using foam wedges/pillows  -Do not massage red bony areas    Outcome: Progressing     Problem: MUSCULOSKELETAL - ADULT  Goal: Maintain or return mobility to safest level of function  Description: INTERVENTIONS:  - Assess patient's ability to carry out ADLs; assess patient's baseline for ADL function and identify physical deficits which impact ability to perform ADLs (bathing, care of mouth/teeth, toileting, grooming, dressing, etc.)  - Assess/evaluate cause of self-care deficits   - Assess range of motion  - Assess patient's mobility  - Assess patient's need for assistive devices and provide as appropriate  - Encourage maximum independence but intervene and supervise when necessary  - Involve family in  performance of ADLs  - Assess for home care needs following discharge   - Consider OT consult to assist with ADL evaluation and planning for discharge  - Provide patient education as appropriate  Outcome: Progressing     Problem: PAIN - ADULT  Goal: Verbalizes/displays adequate comfort level or baseline comfort level  Description: Interventions:  - Encourage patient to monitor pain and request assistance  - Assess pain using appropriate pain scale  - Administer analgesics based on type and severity of pain and evaluate response  - Implement non-pharmacological measures as appropriate and evaluate response  - Consider cultural and social influences on pain and pain management  - Notify physician/advanced practitioner if interventions unsuccessful or patient reports new pain  Outcome: Progressing     Problem: INFECTION - ADULT  Goal: Absence or prevention of progression during hospitalization  Description: INTERVENTIONS:  - Assess and monitor for signs and symptoms of infection  - Monitor lab/diagnostic results  - Monitor all insertion sites, i.e. indwelling lines, tubes, and drains  - Monitor endotracheal if appropriate and nasal secretions for changes in amount and color  - [place] appropriate cooling/warming therapies per order  - Administer medications as ordered  - Instruct and encourage patient and family to use good hand hygiene technique  - Identify and instruct in appropriate isolation precautions for identified infection/condition  Outcome: Progressing     Problem: SAFETY ADULT  Goal: Patient will remain free of falls  Description: INTERVENTIONS:  - Educate patient/family on patient safety including physical limitations  - Instruct patient to call for assistance with activity   - Consult OT/PT to assist with strengthening/mobility   - Keep Call bell within reach  - Keep bed low and locked with side rails adjusted as appropriate  - Keep care items and personal belongings within reach  - Initiate and  maintain comfort rounds  - Make Fall Risk Sign visible to staff  - Offer Toileting every 2 Hours, in advance of need  - Initiate/Maintain bed/chair alarm  - Obtain necessary fall risk management equipment: non skid socks  - Apply yellow socks and bracelet for high fall risk patients  - Consider moving patient to room near nurses station  Outcome: Progressing  Goal: Maintain or return to baseline ADL function  Description: INTERVENTIONS:  -  Assess patient's ability to carry out ADLs; assess patient's baseline for ADL function and identify physical deficits which impact ability to perform ADLs (bathing, care of mouth/teeth, toileting, grooming, dressing, etc.)  - Assess/evaluate cause of self-care deficits   - Assess range of motion  - Assess patient's mobility; develop plan if impaired  - Assess patient's need for assistive devices and provide as appropriate  - Encourage maximum independence but intervene and supervise when necessary  - Involve family in performance of ADLs  - Assess for home care needs following discharge   - Consider OT consult to assist with ADL evaluation and planning for discharge  - Provide patient education as appropriate  Outcome: Progressing  Goal: Maintains/Returns to pre admission functional level  Description: INTERVENTIONS:  - Perform AM-PAC 6 Click Basic Mobility/ Daily Activity assessment daily.  - Set and communicate daily mobility goal to care team and patient/family/caregiver.   - Collaborate with rehabilitation services on mobility goals if consulted  - Perform Range of Motion 3 times a day.  - Reposition patient every 2 hours.  - Dangle patient 3 times a day  - Stand patient 3 times a day  - Ambulate patient 3 times a day  - Out of bed to chair 3 times a day   - Out of bed for meals 3 times a day  - Out of bed for toileting  - Record patient progress and toleration of activity level   Outcome: Progressing     Problem: DISCHARGE PLANNING  Goal: Discharge to home or other facility  with appropriate resources  Description: INTERVENTIONS:  - Identify barriers to discharge w/patient and caregiver  - Arrange for needed discharge resources and transportation as appropriate  - Identify discharge learning needs (meds, wound care, etc.)  - Arrange for interpretive services to assist at discharge as needed  - Refer to Case Management Department for coordinating discharge planning if the patient needs post-hospital services based on physician/advanced practitioner order or complex needs related to functional status, cognitive ability, or social support system  Outcome: Progressing     Problem: Knowledge Deficit  Goal: Patient/family/caregiver demonstrates understanding of disease process, treatment plan, medications, and discharge instructions  Description: Complete learning assessment and assess knowledge base.  Interventions:  - Provide teaching at level of understanding  - Provide teaching via preferred learning methods  Outcome: Progressing     Problem: Prexisting or High Potential for Compromised Skin Integrity  Goal: Skin integrity is maintained or improved  Description: INTERVENTIONS:  - Identify patients at risk for skin breakdown  - Assess and monitor skin integrity  - Assess and monitor nutrition and hydration status  - Monitor labs   - Assess for incontinence   - Turn and reposition patient  - Assist with mobility/ambulation  - Relieve pressure over bony prominences  - Avoid friction and shearing  - Provide appropriate hygiene as needed including keeping skin clean and dry  - Evaluate need for skin moisturizer/barrier cream  - Collaborate with interdisciplinary team   - Patient/family teaching  - Consider wound care consult   Outcome: Progressing     Problem: GASTROINTESTINAL - ADULT  Goal: Maintains or returns to baseline bowel function  Description: INTERVENTIONS:  - Assess bowel function  - Encourage oral fluids to ensure adequate hydration  - Administer IV fluids if ordered to ensure  adequate hydration  - Administer ordered medications as needed  - Encourage mobilization and activity  - Consider nutritional services referral to assist patient with adequate nutrition and appropriate food choices  Outcome: Progressing     Problem: Nutrition/Hydration-ADULT  Goal: Nutrient/Hydration intake appropriate for improving, restoring or maintaining nutritional needs  Description: Monitor and assess patient's nutrition/hydration status for malnutrition. Collaborate with interdisciplinary team and initiate plan and interventions as ordered.  Monitor patient's weight and dietary intake as ordered or per policy. Utilize nutrition screening tool and intervene as necessary. Determine patient's food preferences and provide high-protein, high-caloric foods as appropriate.     INTERVENTIONS:  - Monitor oral intake, urinary output, labs, and treatment plans  - Assess nutrition and hydration status and recommend course of action  - Evaluate amount of meals eaten  - Assist patient with eating if necessary   - Allow adequate time for meals  - Recommend/ encourage appropriate diets, oral nutritional supplements, and vitamin/mineral supplements  - Order, calculate, and assess calorie counts as needed  - Recommend, monitor, and adjust tube feedings and TPN/PPN based on assessed needs  - Assess need for intravenous fluids  - Provide specific nutrition/hydration education as appropriate  - Include patient/family/caregiver in decisions related to nutrition  Outcome: Progressing

## 2025-02-18 NOTE — ASSESSMENT & PLAN NOTE
Improving    Continue to monitor LFTs as they are slowly trending downward.  Consider 24-hour urine copper study on outpatient basis.  Continue to avoid hepatotoxins.  Continue with alcohol cessation/abstinence.  Can continue current diet as ordered.

## 2025-02-19 VITALS
TEMPERATURE: 99.5 F | HEIGHT: 66 IN | DIASTOLIC BLOOD PRESSURE: 91 MMHG | BODY MASS INDEX: 34.58 KG/M2 | HEART RATE: 77 BPM | OXYGEN SATURATION: 97 % | SYSTOLIC BLOOD PRESSURE: 134 MMHG | RESPIRATION RATE: 20 BRPM | WEIGHT: 215.17 LBS

## 2025-02-19 LAB
ALBUMIN SERPL BCG-MCNC: 2.8 G/DL (ref 3.5–5)
ALP SERPL-CCNC: 93 U/L (ref 34–104)
ALT SERPL W P-5'-P-CCNC: 92 U/L (ref 7–52)
ANION GAP SERPL CALCULATED.3IONS-SCNC: 8 MMOL/L (ref 4–13)
AST SERPL W P-5'-P-CCNC: 64 U/L (ref 13–39)
BASOPHILS # BLD AUTO: 0.06 THOUSANDS/ΜL (ref 0–0.1)
BASOPHILS NFR BLD AUTO: 1 % (ref 0–1)
BILIRUB SERPL-MCNC: 1.39 MG/DL (ref 0.2–1)
BUN SERPL-MCNC: 12 MG/DL (ref 5–25)
CALCIUM ALBUM COR SERPL-MCNC: 8.5 MG/DL (ref 8.3–10.1)
CALCIUM SERPL-MCNC: 7.5 MG/DL (ref 8.4–10.2)
CHLORIDE SERPL-SCNC: 104 MMOL/L (ref 96–108)
CO2 SERPL-SCNC: 25 MMOL/L (ref 21–32)
CREAT SERPL-MCNC: 2.69 MG/DL (ref 0.6–1.3)
EOSINOPHIL # BLD AUTO: 0.18 THOUSAND/ΜL (ref 0–0.61)
EOSINOPHIL NFR BLD AUTO: 2 % (ref 0–6)
ERYTHROCYTE [DISTWIDTH] IN BLOOD BY AUTOMATED COUNT: 14.2 % (ref 11.6–15.1)
GFR SERPL CREATININE-BSD FRML MDRD: 26 ML/MIN/1.73SQ M
GLUCOSE SERPL-MCNC: 91 MG/DL (ref 65–140)
HCT VFR BLD AUTO: 27.1 % (ref 36.5–49.3)
HGB BLD-MCNC: 8.7 G/DL (ref 12–17)
IMM GRANULOCYTES # BLD AUTO: 0.06 THOUSAND/UL (ref 0–0.2)
IMM GRANULOCYTES NFR BLD AUTO: 1 % (ref 0–2)
LYMPHOCYTES # BLD AUTO: 1.22 THOUSANDS/ΜL (ref 0.6–4.47)
LYMPHOCYTES NFR BLD AUTO: 14 % (ref 14–44)
MAGNESIUM SERPL-MCNC: 1.5 MG/DL (ref 1.9–2.7)
MCH RBC QN AUTO: 33.6 PG (ref 26.8–34.3)
MCHC RBC AUTO-ENTMCNC: 32.1 G/DL (ref 31.4–37.4)
MCV RBC AUTO: 105 FL (ref 82–98)
MONOCYTES # BLD AUTO: 0.72 THOUSAND/ΜL (ref 0.17–1.22)
MONOCYTES NFR BLD AUTO: 8 % (ref 4–12)
NEUTROPHILS # BLD AUTO: 6.76 THOUSANDS/ΜL (ref 1.85–7.62)
NEUTS SEG NFR BLD AUTO: 74 % (ref 43–75)
NRBC BLD AUTO-RTO: 0 /100 WBCS
PHOSPHATE SERPL-MCNC: 4.1 MG/DL (ref 2.7–4.5)
PLATELET # BLD AUTO: 220 THOUSANDS/UL (ref 149–390)
PMV BLD AUTO: 10.9 FL (ref 8.9–12.7)
POTASSIUM SERPL-SCNC: 4 MMOL/L (ref 3.5–5.3)
PROT SERPL-MCNC: 5.9 G/DL (ref 6.4–8.4)
RBC # BLD AUTO: 2.59 MILLION/UL (ref 3.88–5.62)
SODIUM SERPL-SCNC: 137 MMOL/L (ref 135–147)
WBC # BLD AUTO: 9 THOUSAND/UL (ref 4.31–10.16)

## 2025-02-19 PROCEDURE — 85025 COMPLETE CBC W/AUTO DIFF WBC: CPT

## 2025-02-19 PROCEDURE — 97530 THERAPEUTIC ACTIVITIES: CPT

## 2025-02-19 PROCEDURE — 83735 ASSAY OF MAGNESIUM: CPT | Performed by: NURSE PRACTITIONER

## 2025-02-19 PROCEDURE — 97116 GAIT TRAINING THERAPY: CPT

## 2025-02-19 PROCEDURE — 99232 SBSQ HOSP IP/OBS MODERATE 35: CPT

## 2025-02-19 PROCEDURE — 99239 HOSP IP/OBS DSCHRG MGMT >30: CPT

## 2025-02-19 PROCEDURE — 80053 COMPREHEN METABOLIC PANEL: CPT | Performed by: NURSE PRACTITIONER

## 2025-02-19 PROCEDURE — 84100 ASSAY OF PHOSPHORUS: CPT | Performed by: NURSE PRACTITIONER

## 2025-02-19 PROCEDURE — 99232 SBSQ HOSP IP/OBS MODERATE 35: CPT | Performed by: INTERNAL MEDICINE

## 2025-02-19 RX ORDER — LOPERAMIDE HYDROCHLORIDE 2 MG/1
4 CAPSULE ORAL 4 TIMES DAILY PRN
Qty: 30 CAPSULE | Refills: 0 | Status: SHIPPED | OUTPATIENT
Start: 2025-02-19

## 2025-02-19 RX ORDER — LOPERAMIDE HYDROCHLORIDE 2 MG/1
4 CAPSULE ORAL 3 TIMES DAILY
Status: DISCONTINUED | OUTPATIENT
Start: 2025-02-19 | End: 2025-02-19 | Stop reason: HOSPADM

## 2025-02-19 RX ORDER — MAGNESIUM SULFATE HEPTAHYDRATE 40 MG/ML
4 INJECTION, SOLUTION INTRAVENOUS ONCE
Status: COMPLETED | OUTPATIENT
Start: 2025-02-19 | End: 2025-02-19

## 2025-02-19 RX ORDER — FUROSEMIDE 40 MG/1
40 TABLET ORAL DAILY PRN
Qty: 30 TABLET | Refills: 0 | Status: SHIPPED | OUTPATIENT
Start: 2025-02-19

## 2025-02-19 RX ADMIN — MICONAZOLE NITRATE: 20 CREAM TOPICAL at 09:22

## 2025-02-19 RX ADMIN — METOPROLOL SUCCINATE 25 MG: 25 TABLET, EXTENDED RELEASE ORAL at 09:17

## 2025-02-19 RX ADMIN — CLOPIDOGREL 75 MG: 75 TABLET ORAL at 09:17

## 2025-02-19 RX ADMIN — HEPARIN SODIUM 5000 UNITS: 5000 INJECTION, SOLUTION INTRAVENOUS; SUBCUTANEOUS at 06:06

## 2025-02-19 RX ADMIN — MAGNESIUM SULFATE HEPTAHYDRATE 4 G: 40 INJECTION, SOLUTION INTRAVENOUS at 09:17

## 2025-02-19 RX ADMIN — PANTOPRAZOLE SODIUM 40 MG: 40 TABLET, DELAYED RELEASE ORAL at 06:06

## 2025-02-19 RX ADMIN — FOLIC ACID 1 MG: 1 TABLET ORAL at 09:17

## 2025-02-19 RX ADMIN — Medication 250 MG: at 09:17

## 2025-02-19 RX ADMIN — LOPERAMIDE HYDROCHLORIDE 4 MG: 2 CAPSULE ORAL at 09:17

## 2025-02-19 NOTE — PHYSICAL THERAPY NOTE
PHYSICAL THERAPY NOTE          Patient Name: Emiliano Rodriguez  Today's Date: 2/19/2025 02/19/25 0834   PT Last Visit   PT Visit Date 02/19/25   Note Type   Note Type Treatment   Pain Assessment   Pain Assessment Tool 0-10   Pain Score No Pain   Restrictions/Precautions   Weight Bearing Precautions Per Order No   Other Precautions Fall Risk;Bed Alarm;Chair Alarm   General   Chart Reviewed Yes   Response to Previous Treatment Patient with no complaints from previous session.   Family/Caregiver Present No   Cognition   Overall Cognitive Status WFL   Arousal/Participation Alert;Cooperative   Attention Within functional limits   Orientation Level Oriented X4   Following Commands Follows all commands and directions without difficulty   Subjective   Subjective NO pain. c/o swelling in his feet   Bed Mobility   Additional Comments seated EOB start session   Transfers   Sit to Stand 5  Supervision   Additional items Increased time required;Verbal cues   Stand to Sit 5  Supervision   Additional items Armrests;Increased time required   Stand pivot 5  Supervision   Additional items Increased time required   Additional Comments RW used   Ambulation/Elevation   Gait pattern Excessively slow;Short stride;Foward flexed;Decreased foot clearance;Improper Weight shift   Gait Assistance 5  Supervision   Additional items Verbal cues   Assistive Device Rolling walker   Distance 50'   Ambulation/Elevation Additional Comments declines ambulation in hallway   Balance   Static Sitting Fair +   Dynamic Sitting Fair +   Static Standing Fair   Dynamic Standing Fair   Ambulatory Fair -  (RW)   Endurance Deficit   Endurance Deficit Yes   Activity Tolerance   Activity Tolerance Patient limited by fatigue   Exercises   Hip Flexion Sitting;10 reps;AROM;Bilateral   Hip Abduction Sitting;10 reps;AROM;Bilateral   Hip Adduction Sitting;10 reps;AROM;Bilateral   Knee AROM  Long Arc Quad Sitting;10 reps;AROM;Bilateral   Ankle Pumps Sitting;10 reps;AROM;Bilateral   Marching Sitting;10 reps;AROM;Bilateral   Assessment   Prognosis Good   Problem List Decreased strength;Decreased endurance;Impaired balance;Decreased mobility;Decreased safety awareness   Assessment Pt seen this date for PT treatment session to increase level of mobility and functional activity tolerance. PT treatment session consisting of  therapeutic exercises, transfers and  gait training. Pt. Currently performing bed mobility, tx and ambulation at  SUP  x 1 level of function with use of RW . In comparison to previous session, Pt. With improvement  activity tolerance. Pt is in need of continued activity in PT to improve strength balance endurance mobility transfers and ambulation with return to maximize LOF. From PT/mobility standpoint, recommendation at time of d/c would be Level II:  moderate resource intensity  in order to promote return to PLOF and independence.  The patient's [facility] Basic Mobility Inpatient Short Form Raw Score is 19. A Raw score of greater than 16 suggests the patient may benefit from discharge to home. Please also refer to the recommendation of the Physical Therapist for safe discharge planning. Pt continues to be functioning below baseline level. PT will continue to see pt during current hospitalization in order to address the deficits listed above and provide interventions consistent w/ POC in effort to achieve goals.   Goals   Patient Goals to go home   LTG Expiration Date 02/24/25   PT Treatment Day 7   Plan   Treatment/Interventions Functional transfer training;LE strengthening/ROM;Therapeutic exercise;Endurance training;Elevations;Bed mobility;Gait training   Progress Progressing toward goals   PT Frequency 3-5x/wk   Discharge Recommendation   Rehab Resource Intensity Level, PT II (Moderate Resource Intensity)   AM-PAC Basic Mobility Inpatient   Turning in Flat Bed Without Bedrails 4   Lying  on Back to Sitting on Edge of Flat Bed Without Bedrails 3   Moving Bed to Chair 3   Standing Up From Chair Using Arms 4   Walk in Room 3   Climb 3-5 Stairs With Railing 2   Basic Mobility Inpatient Raw Score 19   Basic Mobility Standardized Score 42.48   [facility] Highest Level Of Mobility   -HL Goal 6: Walk 10 steps or more   -HLM Achieved 7: Walk 25 feet or more   Education   Education Provided Home exercise program;Mobility training   Patient Demonstrates verbal understanding;Reinforcement needed   End of Consult   Patient Position at End of Consult Bedside chair;Bed/Chair alarm activated;All needs within reach   End of Consult Comments discussed POC with PT

## 2025-02-19 NOTE — ASSESSMENT & PLAN NOTE
Likely due to prerenal azotemia and rhabdomyolysis with component of ATN  IV fluids discontinued 2/15/2025  [facility] nephrology recs   Discharged to lasix 40mg prn for edema once diarrhea completely resolved  Outpatient follow-up being arranged

## 2025-02-19 NOTE — ASSESSMENT & PLAN NOTE
Received bedside shift report from Princess GINA RODRIGUEZ RN regarding patient and assumed care. Patient awake, calm and stable, currently positioned in bed for comfort and safety; call light within reach. Denies pain or discomfort at this time. Will continue to monitor.      Reinforced complete alcohol cessation.

## 2025-02-19 NOTE — ASSESSMENT & PLAN NOTE
Suspect multifactorial with alcohol abuse, rhabdomyolysis, hepatis steatosis   RUQ US with hepatomegaly, steatosis, patent major vessels  Appreciate GI consult  May be component of alcohol hepatitis, no steroids indicated   LFTs continue to improve   For outpatient follow-up  Complete alcohol cessation recommended   Would benefit from outpatient elastography & 24-hour urinary karen testing per GI

## 2025-02-19 NOTE — ASSESSMENT & PLAN NOTE
Did have rectal tube earlier in hospitalization, since removed  Enteric panel negative, C diff negative. Pancreatitis elastase wnl. O&P negative. 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 Outpatient c scope recommended in 6-8 weeks and repeat fecal calprotectin at that time to ensure it has normalized

## 2025-02-19 NOTE — ASSESSMENT & PLAN NOTE
Improving/Stable    Decreased imodium down to TID dosing.    Continue Banatrol supplement/probiotics.  Continue to maintain a large bore IV.  Continue to monitor hemoglobin and transfuse as per protocol.   Continue to monitor stool output for any overt signs of GI bleeding.   Continue with serial abdominal exams.     Will follow-up outpatient regarding any outpatient procedures.

## 2025-02-19 NOTE — ASSESSMENT & PLAN NOTE
MRI negative for acute CVA, note does report baseline L sided weakness from prior stroke  Muscle strength seems to be improving  Completed 5-days of IV thiamine. Continue with oral supplementation   Resolved   Likely secondary to rhabdo

## 2025-02-19 NOTE — ASSESSMENT & PLAN NOTE
Limited labs to review in steady state with baseline creatinine appears to be around 0.9 mg/dL.  Peak creatinine 4.8 mg/dL on 2/10-> 3.1 mg/dL today.  UA significant for large blood, 2+ protein.  Renal ultrasound without hydronephrosis.  Etiology of GUNJAN severe prerenal state in addition to rhabdomyolysis.  Fluids discontinued 2/15.  Given albumin over the weekend and yesterday.    Suspected polyuric phase of ATN.  Urine output less than yesterday.  Continues to have significant peripheral edema.  Recommend holding off on crystalloid/colloid/diuretic and allowing auto diuresis.  Patient agreeable to plan.  Tentative discharge tomorrow    February 19, 2025  Patient creatinine improving to 2.69.  Will schedule for outpatient follow-up.  Patient appearing hypervolemic so we will provide a dose of as needed Lasix to take as an outpatient once he is no longer having diarrhea

## 2025-02-19 NOTE — PROGRESS NOTES
Progress Note - Gastroenterology   Name: Emiliano Rodriguez 49 y.o. male I MRN: [id number]  Unit/Bed#: 404-01 I Date of Admission: 2/7/2025   Date of Service: 2/19/2025 I Hospital Day: 12    Assessment & Plan  Abnormal LFTs  Improving/Stable    Continue to monitor LFTs as they are continuing to slowly trending downward.  Consider 24-hour urine copper study on outpatient basis.  Continue to avoid hepatotoxins.  Continue with alcohol cessation/abstinence.  Can continue current diet as ordered.  Alcohol abuse  Reinforced complete alcohol cessation.   Rhabdomyolysis  Per primary team.   Diarrhea  Improving/Stable    Decreased imodium down to TID dosing.    Continue Banatrol supplement/probiotics.  Continue to maintain a large bore IV.  Continue to monitor hemoglobin and transfuse as per protocol.   Continue to monitor stool output for any overt signs of GI bleeding.   Continue with serial abdominal exams.     Will follow-up outpatient regarding any outpatient procedures.  Gastroesophageal reflux disease without esophagitis  Continue PPI as ordered.   Continue PRN antiemetics as ordered.   Acute renal failure (ARF) (HCC)  Per primary team and Nephrology.   Bilateral lower extremity edema  Continues with +1-2 b/l Lext Edema  Management as per primary team.     Continue rest of medications as per primary team.   Continue supportive care.     Upon discharge recommend the patient continue with Imodium every 6-8 hours as needed for loose stool/diarrhea at home.  Recommend patient continue Banatrol at least twice daily with meals until stools have returned to normal frequency and consistency.  Encouraged patient to continue to try to drink at least 32 to 64 ounces of water daily.  Encouraged patient to continue to completely abstain from alcohol.    I have discussed the above management plan in detail with the primary service.   Patient is ready for discharge from a GI standpoint.  Gastroenterology service signing  off.    Subjective   Patient is a 49-year-old male who is currently being seen for his elevated LFTs, diarrhea, GERD symptoms, and alcohol abuse.  The patient continues with improvement with regards to the frequency and consistency of his stools as they are becoming more formed and his last bowel movement was earlier this morning without any evidence of blood or melena per patient.  He continues to deny any nausea, vomiting, GERD symptoms, or abdominal pain.  He is currently tolerating his diet without any issues.  He is very eager to discuss being discharged today.  His LFTs continue to slowly trend down and his hemoglobin remained stable, without any overt evidence of GI bleeding.      Objective :  Temp:  [97.9 °F (36.6 °C)-99.5 °F (37.5 °C)] 99.5 °F (37.5 °C)  HR:  [77-85] 77  BP: (130-134)/(85-91) 134/91  Resp:  [18-20] 20  SpO2:  [94 %-97 %] 97 %  O2 Device: None (Room air)    Physical Exam  Exam: Pt was sitting out of bed in the chair comfortably during today's exam, A&O x 3, pleasant and cooperative. Abdomen is soft and distended, nontender, with normal bowel sounds x 4. Skin is non-icteric.  +1-2 pitting edema noted of the b/l lower extremities upon exam today.       Lab Results: I have reviewed the following results:CBC/BMP:   .     02/19/25  0554   WBC 9.00   HGB 8.7*   HCT 27.1*      SODIUM 137   K 4.0      CO2 25   BUN 12   CREATININE 2.69*   GLUC 91   MG 1.5*   PHOS 4.1    , LFTs:   .     02/19/25  0554   AST 64*   ALT 92*   ALB 2.8*   TBILI 1.39*   ALKPHOS 93        Imaging Results Review: No pertinent imaging studies reviewed.

## 2025-02-19 NOTE — ASSESSMENT & PLAN NOTE
With peak CK >20,000  Now less than 1000, no need to trend further  Resolved   Dispo: C, patient declines STR

## 2025-02-19 NOTE — PLAN OF CARE
Problem: SKIN/TISSUE INTEGRITY - ADULT  Goal: Skin Integrity remains intact(Skin Breakdown Prevention)  Description: Assess:  -Perform Tani assessment every 2  -Clean and moisturize skin every incont. Episode   -Inspect skin when repositioning, toileting, and assisting with ADLS  -Assess extremities for adequate circulation and sensation     Bed Management:  -Have minimal linens on bed & keep smooth, unwrinkled  -Change linens as needed when moist or perspiring  -Avoid sitting or lying in one position for more than 2 hours while in bed  -Keep HOB at 30 degrees     Toileting:  -Offer bedside commode  -Assess for incontinence every shift  -Use incontinent care products after each incontinent episode such as alejandra wipes    Activity:  -Mobilize patient 3 times a day  -Encourage activity and walks on unit  -Encourage or provide ROM exercises   -Turn and reposition patient every 2 Hours  -Use appropriate equipment to lift or move patient in bed  -Instruct/ Assist with weight shifting every 60 mins when out of bed in chair  -Consider limitation of chair time 6 hour intervals    Skin Care:  -Avoid use of baby powder, tape, friction and shearing, hot water or constrictive clothing  -Relieve pressure over bony prominences using foam wedges/pillows  -Do not massage red bony areas    Outcome: Progressing     Problem: MUSCULOSKELETAL - ADULT  Goal: Maintain or return mobility to safest level of function  Description: INTERVENTIONS:  - Assess patient's ability to carry out ADLs; assess patient's baseline for ADL function and identify physical deficits which impact ability to perform ADLs (bathing, care of mouth/teeth, toileting, grooming, dressing, etc.)  - Assess/evaluate cause of self-care deficits   - Assess range of motion  - Assess patient's mobility  - Assess patient's need for assistive devices and provide as appropriate  - Encourage maximum independence but intervene and supervise when necessary  - Involve family in  performance of ADLs  - Assess for home care needs following discharge   - Consider OT consult to assist with ADL evaluation and planning for discharge  - Provide patient education as appropriate  Outcome: Progressing     Problem: GASTROINTESTINAL - ADULT  Goal: Maintains or returns to baseline bowel function  Description: INTERVENTIONS:  - Assess bowel function  - Encourage oral fluids to ensure adequate hydration  - Administer IV fluids if ordered to ensure adequate hydration  - Administer ordered medications as needed  - Encourage mobilization and activity  - Consider nutritional services referral to assist patient with adequate nutrition and appropriate food choices  Outcome: Progressing     Problem: PAIN - ADULT  Goal: Verbalizes/displays adequate comfort level or baseline comfort level  Description: Interventions:  - Encourage patient to monitor pain and request assistance  - Assess pain using appropriate pain scale  - Administer analgesics based on type and severity of pain and evaluate response  - Implement non-pharmacological measures as appropriate and evaluate response  - Consider cultural and social influences on pain and pain management  - Notify physician/advanced practitioner if interventions unsuccessful or patient reports new pain  Outcome: Progressing     Problem: INFECTION - ADULT  Goal: Absence or prevention of progression during hospitalization  Description: INTERVENTIONS:  - Assess and monitor for signs and symptoms of infection  - Monitor lab/diagnostic results  - Monitor all insertion sites, i.e. indwelling lines, tubes, and drains  - Monitor endotracheal if appropriate and nasal secretions for changes in amount and color  - [place] appropriate cooling/warming therapies per order  - Administer medications as ordered  - Instruct and encourage patient and family to use good hand hygiene technique  - Identify and instruct in appropriate isolation precautions for identified  infection/condition  Outcome: Progressing     Problem: SAFETY ADULT  Goal: Patient will remain free of falls  Description: INTERVENTIONS:  - Educate patient/family on patient safety including physical limitations  - Instruct patient to call for assistance with activity   - Consult OT/PT to assist with strengthening/mobility   - Keep Call bell within reach  - Keep bed low and locked with side rails adjusted as appropriate  - Keep care items and personal belongings within reach  - Initiate and maintain comfort rounds  - Make Fall Risk Sign visible to staff  - Offer Toileting every 2 Hours, in advance of need  - Initiate/Maintain bed/chair alarm  - Obtain necessary fall risk management equipment: non skid socks  - Apply yellow socks and bracelet for high fall risk patients  - Consider moving patient to room near nurses station  Outcome: Progressing  Goal: Maintain or return to baseline ADL function  Description: INTERVENTIONS:  -  Assess patient's ability to carry out ADLs; assess patient's baseline for ADL function and identify physical deficits which impact ability to perform ADLs (bathing, care of mouth/teeth, toileting, grooming, dressing, etc.)  - Assess/evaluate cause of self-care deficits   - Assess range of motion  - Assess patient's mobility; develop plan if impaired  - Assess patient's need for assistive devices and provide as appropriate  - Encourage maximum independence but intervene and supervise when necessary  - Involve family in performance of ADLs  - Assess for home care needs following discharge   - Consider OT consult to assist with ADL evaluation and planning for discharge  - Provide patient education as appropriate  Outcome: Progressing  Goal: Maintains/Returns to pre admission functional level  Description: INTERVENTIONS:  - Perform AM-PAC 6 Click Basic Mobility/ Daily Activity assessment daily.  - Set and communicate daily mobility goal to care team and patient/family/caregiver.   - Collaborate  with rehabilitation services on mobility goals if consulted  - Perform Range of Motion 3 times a day.  - Reposition patient every 2 hours.  - Dangle patient 3 times a day  - Stand patient 3 times a day  - Ambulate patient 3 times a day  - Out of bed to chair 3 times a day   - Out of bed for meals 3 times a day  - Out of bed for toileting  - Record patient progress and toleration of activity level   Outcome: Progressing     Problem: DISCHARGE PLANNING  Goal: Discharge to home or other facility with appropriate resources  Description: INTERVENTIONS:  - Identify barriers to discharge w/patient and caregiver  - Arrange for needed discharge resources and transportation as appropriate  - Identify discharge learning needs (meds, wound care, etc.)  - Arrange for interpretive services to assist at discharge as needed  - Refer to Case Management Department for coordinating discharge planning if the patient needs post-hospital services based on physician/advanced practitioner order or complex needs related to functional status, cognitive ability, or social support system  Outcome: Progressing     Problem: Knowledge Deficit  Goal: Patient/family/caregiver demonstrates understanding of disease process, treatment plan, medications, and discharge instructions  Description: Complete learning assessment and assess knowledge base.  Interventions:  - Provide teaching at level of understanding  - Provide teaching via preferred learning methods  Outcome: Progressing     Problem: Prexisting or High Potential for Compromised Skin Integrity  Goal: Skin integrity is maintained or improved  Description: INTERVENTIONS:  - Identify patients at risk for skin breakdown  - Assess and monitor skin integrity  - Assess and monitor nutrition and hydration status  - Monitor labs   - Assess for incontinence   - Turn and reposition patient  - Assist with mobility/ambulation  - Relieve pressure over bony prominences  - Avoid friction and shearing  -  Provide appropriate hygiene as needed including keeping skin clean and dry  - Evaluate need for skin moisturizer/barrier cream  - Collaborate with interdisciplinary team   - Patient/family teaching  - Consider wound care consult   Outcome: Progressing     Problem: Nutrition/Hydration-ADULT  Goal: Nutrient/Hydration intake appropriate for improving, restoring or maintaining nutritional needs  Description: Monitor and assess patient's nutrition/hydration status for malnutrition. Collaborate with interdisciplinary team and initiate plan and interventions as ordered.  Monitor patient's weight and dietary intake as ordered or per policy. Utilize nutrition screening tool and intervene as necessary. Determine patient's food preferences and provide high-protein, high-caloric foods as appropriate.     INTERVENTIONS:  - Monitor oral intake, urinary output, labs, and treatment plans  - Assess nutrition and hydration status and recommend course of action  - Evaluate amount of meals eaten  - Assist patient with eating if necessary   - Allow adequate time for meals  - Recommend/ encourage appropriate diets, oral nutritional supplements, and vitamin/mineral supplements  - Order, calculate, and assess calorie counts as needed  - Recommend, monitor, and adjust tube feedings and TPN/PPN based on assessed needs  - Assess need for intravenous fluids  - Provide specific nutrition/hydration education as appropriate  - Include patient/family/caregiver in decisions related to nutrition  Outcome: Progressing

## 2025-02-19 NOTE — PROGRESS NOTES
Progress Note - Nephrology   Name: Emiliano Rodriguez 49 y.o. male I MRN: [id number]  Unit/Bed#: 404-01 I Date of Admission: 2/7/2025   Date of Service: 2/19/2025 I Hospital Day: 12     Assessment & Plan  Acute renal failure (ARF) (HCC)  Limited labs to review in steady state with baseline creatinine appears to be around 0.9 mg/dL.  Peak creatinine 4.8 mg/dL on 2/10-> 3.1 mg/dL today.  UA significant for large blood, 2+ protein.  Renal ultrasound without hydronephrosis.  Etiology of GUNJAN severe prerenal state in addition to rhabdomyolysis.  Fluids discontinued 2/15.  Given albumin over the weekend and yesterday.    Suspected polyuric phase of ATN.  Urine output less than yesterday.  Continues to have significant peripheral edema.  Recommend holding off on crystalloid/colloid/diuretic and allowing auto diuresis.  Patient agreeable to plan.  Tentative discharge tomorrow    February 19, 2025  Patient creatinine improving to 2.69.  Will schedule for outpatient follow-up.  Patient appearing hypervolemic so we will provide a dose of as needed Lasix to take as an outpatient once he is no longer having diarrhea    Rhabdomyolysis  Total CK less than 1000, no need to trend  Bilateral lower extremity edema  Iatrogenic from resuscitation and rhabdomyolysis.  Allow auto diuresis.  Prescribed Lasix at discharge prn leg swelling  Electrolyte imbalance  Magnesium supplement at discharge  Volume depletion  Resolved  Diarrhea  Improving   Gastroesophageal reflux disease without esophagitis  Continue with PPI, management per GI.  Alcohol abuse  Continue [facility] protocol, further care and management according to hospitalist recommendations.  Abnormal LFTs  In setting of rhabdomyolysis and alcohol use.  Appreciate gastroenterology's input  Hypokalemia   Continue potassium chloride 40 mEq twice daily.  Acute tubular necrosis (HCC)  Continue supportive care at this time; the patient may be developing nonoliguric ATN.    Weakness of left lower  extremity  Strength is improving, able to lift LLE off bed today.  Hypomagnesemia   Continue to monitor and replete as indicated.  Hypophosphatemia  Improved status post replacement.         Subjective   Brief History of Admission - 49-year-old man with EtOH, GERD, smoker admitted 2/7 being treated for ATN GUNJAN in setting of rhabdomyolysis, volume depleted state, diarrhea.  Patient seen and examined reports doing well, no complaints.  Diarrhea slowly improving.        Objective :  Temp:  [97.9 °F (36.6 °C)-99.5 °F (37.5 °C)] 99.5 °F (37.5 °C)  HR:  [77-85] 77  BP: (130-134)/(85-91) 134/91  Resp:  [18-20] 20  SpO2:  [94 %-97 %] 97 %  O2 Device: None (Room air)    Current Weight: Weight - Scale: 97.6 kg (215 lb 2.7 oz)  First Weight: Weight - Scale: 90.3 kg (199 lb 1.2 oz)  I/O         02/17 0701  02/18 0700 02/18 0701  02/19 0700 02/19 0701  02/20 0700    P.O. 1920 2640     Total Intake(mL/kg) 1920 (19.7) 2640 (27)     Urine (mL/kg/hr) 3825 (1.6) 2750 (1.2) 400 (0.8)    Stool  0     Total Output 3825 2750 400    Net -1905 -110 -400           Unmeasured Stool Occurrence  2 x           Physical Exam  Vitals and nursing note reviewed.   Constitutional:       General: He is not in acute distress.     Appearance: He is well-developed.   HENT:      Head: Normocephalic and atraumatic.   Eyes:      Conjunctiva/sclera: Conjunctivae normal.   Cardiovascular:      Rate and Rhythm: Normal rate and regular rhythm.      Heart sounds: No murmur heard.  Pulmonary:      Effort: Pulmonary effort is normal. No respiratory distress.      Breath sounds: Normal breath sounds.   Abdominal:      Palpations: Abdomen is soft.      Tenderness: There is no abdominal tenderness.   Musculoskeletal:      Cervical back: Neck supple.      Right lower leg: Edema present.      Left lower leg: Edema present.   Skin:     General: Skin is warm and dry.      Capillary Refill: Capillary refill takes less than 2 seconds.   Neurological:      Mental Status:  He is alert.   Psychiatric:         Mood and Affect: Mood normal.         Medications:    Current Facility-Administered Medications:     acetaminophen (TYLENOL) tablet 650 mg, 650 mg, Oral, Q6H PRN, Prudencio Nix MD, 650 mg at 02/10/25 1126    clopidogrel (PLAVIX) tablet 75 mg, 75 mg, Oral, Daily, Ankita Katz MD, 75 mg at 02/19/25 0917    diphenoxylate-atropine (LOMOTIL) 2.5-0.025 mg per tablet 1 tablet, 1 tablet, Oral, 4x Daily PRN, Taylor Champion PA-C    folic acid (FOLVITE) tablet 1 mg, 1 mg, Oral, Daily, Ankita Katz MD, 1 mg at 02/19/25 0917    heparin (porcine) subcutaneous injection 5,000 Units, 5,000 Units, Subcutaneous, Q8H DAVION, 5,000 Units at 02/19/25 0606 **AND** [COMPLETED] Platelet count, , , Once, Ankita Katz MD    loperamide (IMODIUM) capsule 4 mg, 4 mg, Oral, TID, LAURIE Arauz, 4 mg at 02/19/25 0917    magnesium sulfate 4 g/100 mL IVPB (premix) 4 g, 4 g, Intravenous, Once, Taylor Champion PA-C, Last Rate: 25 mL/hr at 02/19/25 0917, 4 g at 02/19/25 0917    metoprolol succinate (TOPROL-XL) 24 hr tablet 25 mg, 25 mg, Oral, Daily, Ankita Katz MD, 25 mg at 02/19/25 0917    moisture barrier miconazole 2% cream (aka MELODY MOISTURE BARRIER ANTIFUNGAL CREAM), , Topical, BID, Isaías Eller DO, Given at 02/19/25 0922    nicotine (NICODERM CQ) 21 mg/24 hr TD 24 hr patch 1 patch, 1 patch, Transdermal, Daily, Ankita Katz MD, 1 patch at 02/18/25 0946    pantoprazole (PROTONIX) EC tablet 40 mg, 40 mg, Oral, Early Morning, Ankita Katz MD, 40 mg at 02/19/25 0606    saccharomyces boulardii (FLORASTOR) capsule 250 mg, 250 mg, Oral, BID, Ankita Katz MD, 250 mg at 02/19/25 0917    simethicone (MYLICON) chewable tablet 80 mg, 80 mg, Oral, Q6H PRN, Isaías Eller DO, 80 mg at 02/16/25 2131    traZODone (DESYREL) tablet 50 mg, 50 mg, Oral, HS PRN, Garcia Escobar MD, 50 mg at 02/17/25 2326      Lab Results: I have reviewed the following results:  Results from  "last 7 days   Lab Units 02/19/25  0554 02/18/25  0443 02/17/25  0442 02/16/25  0434 02/15/25  0520 02/14/25  1848 02/14/25  0509 02/13/25  0800   WBC Thousand/uL 9.00 8.44 8.97 7.88  --   --  8.48  --    HEMOGLOBIN g/dL 8.7* 9.0* 8.6* 9.0*  --   --  9.4*  --    HEMATOCRIT % 27.1* 28.4* 26.9* 28.3*  --   --  29.1*  --    PLATELETS Thousands/uL 220 229 212 212  --   --  184  --    POTASSIUM mmol/L 4.0 3.7 3.9 3.8 3.7 4.0 3.3* 3.6   CHLORIDE mmol/L 104 106 105 106 108 108 107 106   CO2 mmol/L 25 23 23 24 25 23 24 23   BUN mg/dL 12 16 17 18 22 23 25 29*   CREATININE mg/dL 2.69* 2.88* 3.07* 3.57* 3.79* 3.93* 3.95* 4.20*   CALCIUM mg/dL 7.5* 7.6* 7.7* 7.4* 7.3* 7.3* 7.3* 7.4*   MAGNESIUM mg/dL 1.5* 1.6* 1.6* 2.1 1.8*  --  1.8* 1.7*   PHOSPHORUS mg/dL 4.1 4.5 3.9 3.4 3.5  --  3.4 2.5*   ALBUMIN g/dL 2.8* 2.9* 2.5* 2.4*  --  2.3* 2.2*  --        Administrative Statements     Portions of the record may have been created with voice recognition software. Occasional wrong word or \"sound a like\" substitutions may have occurred due to the inherent limitations of voice recognition software. Read the chart carefully and recognize, using context, where substitutions have occurred.If you have any questions, please contact the dictating provider.  "

## 2025-02-19 NOTE — PLAN OF CARE
Problem: PHYSICAL THERAPY ADULT  Goal: Performs mobility at highest level of function for planned discharge setting.  See evaluation for individualized goals.  Description: Treatment/Interventions: Functional transfer training, LE strengthening/ROM, Therapeutic exercise, Endurance training, Elevations, Bed mobility, Gait training          See flowsheet documentation for full assessment, interventions and recommendations.  Outcome: Progressing  Note: Prognosis: Good  Problem List: Decreased strength, Decreased endurance, Impaired balance, Decreased mobility, Decreased safety awareness  Assessment: Pt seen this date for PT treatment session to increase level of mobility and functional activity tolerance. PT treatment session consisting of  therapeutic exercises, transfers and  gait training. Pt. Currently performing bed mobility, tx and ambulation at  SUP  x 1 level of function with use of RW . In comparison to previous session, Pt. With improvement  activity tolerance. Pt is in need of continued activity in PT to improve strength balance endurance mobility transfers and ambulation with return to maximize LOF. From PT/mobility standpoint, recommendation at time of d/c would be Level II:  moderate resource intensity  in order to promote return to PLOF and independence.  The patient's AM-[facility] Basic Mobility Inpatient Short Form Raw Score is 19. A Raw score of greater than 16 suggests the patient may benefit from discharge to home. Please also refer to the recommendation of the Physical Therapist for safe discharge planning. Pt continues to be functioning below baseline level. PT will continue to see pt during current hospitalization in order to address the deficits listed above and provide interventions consistent w/ POC in effort to achieve goals.        Rehab Resource Intensity Level, PT: II (Moderate Resource Intensity)    See flowsheet documentation for full assessment.

## 2025-02-19 NOTE — DISCHARGE SUMMARY
Discharge Summary - Hospitalist   Name: Emiliano Rodriguez 49 y.o. male I MRN: [id number]  Unit/Bed#: 404-01 I Date of Admission: 2/7/2025   Date of Service: 2/19/2025 I Hospital Day: 12     Assessment & Plan  Rhabdomyolysis  With peak CK >20,000  Now less than 1000, no need to trend further  Resolved   Dispo: [facility], patient declines STR  Acute renal failure (ARF) (HCC)  Likely due to prerenal azotemia and rhabdomyolysis with component of ATN  IV fluids discontinued 2/15/2025  Appreciate nephrology recs   Discharged to lasix 40mg prn for edema once diarrhea completely resolved  Outpatient follow-up being arranged   Diarrhea  Did have rectal tube earlier in hospitalization, since removed  Enteric panel negative, C diff negative. Pancreatitis elastase wnl. O&P negative. 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 Outpatient c scope recommended in 6-8 weeks and repeat fecal calprotectin at that time to ensure it has normalized   Weakness of left lower extremity  MRI negative for acute CVA, note does report baseline L sided weakness from prior stroke  Muscle strength seems to be improving  Completed 5-days of IV thiamine. Continue with oral supplementation   Resolved   Likely secondary to rhabdo  Abnormal LFTs  Suspect multifactorial with alcohol abuse, rhabdomyolysis, hepatis steatosis   RUQ US with hepatomegaly, steatosis, patent major vessels  Appreciate GI consult  May be component of alcohol hepatitis, no steroids indicated   LFTs continue to improve   For outpatient follow-up  Complete alcohol cessation  recommended   Would benefit from outpatient elastography & 24-hour urinary karen testing per GI  Gastroesophageal reflux disease without esophagitis  Protonix 40 mg daily  Alcohol abuse  Endorses at least 5 beers a day  Folic acid, thiamine  Patient did not exhibit any signs of acute alcohol withdrawal, CIWA protocol was not needed, it has been discontinued.  Hypokalemia  Resolved  Acute tubular necrosis (HCC)  Suspected component of ATN, nephrology consult appreciated.  Hypomagnesemia  IV magnesium as needed  Discharged with magnesium gluconate   Repeat labs in 1 week   Hypophosphatemia  Phosphorus level normal today  Bilateral lower extremity edema  Discharged with lasix 40mg as needed per nephrology  Electrolyte imbalance  Repeat BMP and magnesium level in 1 week with outpatient labs     Medical Problems       Resolved Problems  Date Reviewed: 3/7/2019          Resolved    Hyponatremia 2/15/2025     Resolved by  Isaías Eller DO    Acute diverticulitis 2/15/2025     Resolved by  Isaías Eller DO        Discharging Physician / Practitioner: Taylor Champion PA-C  PCP: Emmett Jimenez DO  Admission Date:   Admission Orders (From admission, onward)       Ordered        02/07/25 0706  INPATIENT ADMISSION  Once                          Discharge Date: 02/19/25    Consultations During Hospital Stay:  Nephrology   GI  PT/OT    Procedures Performed:   None    Significant Findings / Test Results:   CT head: No acute intracranial abnormality. Mildly increased ventricular prominence since the prior studies raising the question of normal pressure hydrocephalus.   CT abd/pelvis: Acute sigmoid diverticulitis. Inflamed sigmoid abuts the dome of the urinary bladder, with associated bladder wall thickening raising the question of developing fistula. Hepatosplenomegaly with hepatic steatosis. Indeterminate 1.6 cm left renal cyst. Recommend follow-up ultrasound. Nonobstructing right renal calculus.   RUQ US:  Hepatomegaly. Steatosis, also demonstrated on the recent CT. No biliary dilatation Patent major hepatic vessels with normodirectional flow.   US kidney/bladder: Technically limited study. Left lower pole cyst not well visualized. No hydronephrosis. 20 mL post void residual in the bladder.  XR hip/pelvis: Arthritis of both hips. No acute findings   XR femur left: Limited field-of-view exam shows no acute findings. Arthritis at the knee   MRI brain wo: No mass effect, acute intracranial hemorrhage or evidence of recent infarction. Small chronic infarcts, as described above. Mild chronic microvascular ischemic changes mildly worsened since the prior exam.     Incidental Findings:   None       Test Results Pending at Discharge (will require follow up):   None     Outpatient Tests Requested:  Outpatient follow-up with nephrology  Outpatient follow-up with gastroenterology  Follow-up with PCP within 1 week    Complications:  none     Reason for Admission: bilateral lower extremity leg weakness     Hospital Course:   Emiliano Rodriguez is a 49 y.o. male patient who originally presented to the hospital on 2/7/2025 due to bilateral lower extremity weakness, found with significant GUNJAN and rhabdomyolysis. Nephrology was consulted. Patient was treated with aggressive IV fluid hydration with eventual resolution of rhabdomyolysis and improvement in creatinine. Patient developed bilateral LE edema and was recommended for prn lasix at discharge by nephrology once the diarrhea completely resolves. He was cleared for discharge by nephrology with outpatient follow-up. Patients lower extremity weakness also improved significantly. Of note, he did have MRI brain given L>R sided weakness which was negative for acute CVA. He was treated with high dose IV thiamine given alcohol use history. GI was additionally consulted given elevated LFTs and suspected to be multifactorial with fatty liver, alcohol use disorder, rhabdomyolysis. This  "continued to improve over hospitalization. Of note, he did have bout of acute diverticulitis during hospital stay that resolved. He completed course of abx. Patient recommended for eventual outpatient colonoscopy. Patient suffered from acute diarrhea during hospitalization. Stool studies obtained and negative. He was managed with Imodium and banana flakes with significant improvement in stool frequency and consistency. GI recommended to continue with Imodium as needed and bid Banatrol until his diarrhea completely resolves. Patient was cleared for discharge from a GI standpoint & they are arranging for outpatient follow-up. Patient was noted with persistent hypomagnesia. He was discharged with magnesium supplementation and instructed to obtain repeat labs in 1 week. He was seen by PT/OT and recommended for rehab. Patient declined and [facility] services were arranged. Patient discharged to home in stable condition.         Please see above list of diagnoses and related plan for additional information.     Condition at Discharge: stable    Discharge Day Visit / Exam:   Subjective:  Patient reports his diarrhea continues to improve. No other acute complaints.   Vitals: Blood Pressure: 134/91 (02/18/25 2235)  Pulse: 77 (02/18/25 2235)  Temperature: 99.5 °F (37.5 °C) (02/18/25 2235)  Temp Source: Axillary (02/18/25 2235)  Respirations: 20 (02/18/25 2235)  Height: 5' 6\" (167.6 cm) (02/07/25 1433)  Weight - Scale: 97.6 kg (215 lb 2.7 oz) (02/18/25 0600)  SpO2: 97 % (02/19/25 0300)  Physical Exam  Constitutional:       General: He is not in acute distress.  HENT:      Head: Normocephalic and atraumatic.   Cardiovascular:      Rate and Rhythm: Normal rate and regular rhythm.   Pulmonary:      Effort: Pulmonary effort is normal.      Breath sounds: Normal breath sounds. No wheezing or rales.   Abdominal:      General: Abdomen is flat. Bowel sounds are normal.      Palpations: Abdomen is soft.      Tenderness: There is no abdominal " tenderness. There is no guarding.   Musculoskeletal:      Right lower leg: Edema present.      Left lower leg: Edema present.   Skin:     General: Skin is warm and dry.   Neurological:      General: No focal deficit present.      Mental Status: He is alert and oriented to person, place, and time.          Discussion with Family: Patient declined call to .     Discharge instructions/Information to patient and family:   See after visit summary for information provided to patient and family.      Provisions for Follow-Up Care:  See after visit summary for information related to follow-up care and any pertinent home health orders.      Mobility at time of Discharge:   Basic Mobility Inpatient Raw Score: 19  JH-HLM Goal: 6: Walk 10 steps or more  JH-HLM Achieved: 7: Walk 25 feet or more  HLM Goal achieved. Continue to encourage appropriate mobility.     Disposition:   Home with [facility]NA Services (Reminder: Complete face to face encounter)    Planned Readmission: none    Discharge Medications:  See after visit summary for reconciled discharge medications provided to patient and/or family.      Administrative Statements   Discharge Statement:  I have spent a total time of 45 minutes in caring for this patient on the day of the visit/encounter. .    **Please Note: This note may have been constructed using a voice recognition system**

## 2025-02-19 NOTE — PLAN OF CARE
Problem: SKIN/TISSUE INTEGRITY - ADULT  Goal: Skin Integrity remains intact(Skin Breakdown Prevention)  Description: Assess:  -Perform Tani assessment every 2  -Clean and moisturize skin every incont. Episode   -Inspect skin when repositioning, toileting, and assisting with ADLS  -Assess extremities for adequate circulation and sensation     Bed Management:  -Have minimal linens on bed & keep smooth, unwrinkled  -Change linens as needed when moist or perspiring  -Avoid sitting or lying in one position for more than 2 hours while in bed  -Keep HOB at 30 degrees     Toileting:  -Offer bedside commode  -Assess for incontinence every shift  -Use incontinent care products after each incontinent episode such as alejandra wipes    Activity:  -Mobilize patient 3 times a day  -Encourage activity and walks on unit  -Encourage or provide ROM exercises   -Turn and reposition patient every 2 Hours  -Use appropriate equipment to lift or move patient in bed  -Instruct/ Assist with weight shifting every 60 mins when out of bed in chair  -Consider limitation of chair time 6 hour intervals    Skin Care:  -Avoid use of baby powder, tape, friction and shearing, hot water or constrictive clothing  -Relieve pressure over bony prominences using foam wedges/pillows  -Do not massage red bony areas    Outcome: Progressing     Problem: MUSCULOSKELETAL - ADULT  Goal: Maintain or return mobility to safest level of function  Description: INTERVENTIONS:  - Assess patient's ability to carry out ADLs; assess patient's baseline for ADL function and identify physical deficits which impact ability to perform ADLs (bathing, care of mouth/teeth, toileting, grooming, dressing, etc.)  - Assess/evaluate cause of self-care deficits   - Assess range of motion  - Assess patient's mobility  - Assess patient's need for assistive devices and provide as appropriate  - Encourage maximum independence but intervene and supervise when necessary  - Involve family in  performance of ADLs  - Assess for home care needs following discharge   - Consider OT consult to assist with ADL evaluation and planning for discharge  - Provide patient education as appropriate  Outcome: Progressing     Problem: GASTROINTESTINAL - ADULT  Goal: Maintains or returns to baseline bowel function  Description: INTERVENTIONS:  - Assess bowel function  - Encourage oral fluids to ensure adequate hydration  - Administer IV fluids if ordered to ensure adequate hydration  - Administer ordered medications as needed  - Encourage mobilization and activity  - Consider nutritional services referral to assist patient with adequate nutrition and appropriate food choices  Outcome: Progressing     Problem: PAIN - ADULT  Goal: Verbalizes/displays adequate comfort level or baseline comfort level  Description: Interventions:  - Encourage patient to monitor pain and request assistance  - Assess pain using appropriate pain scale  - Administer analgesics based on type and severity of pain and evaluate response  - Implement non-pharmacological measures as appropriate and evaluate response  - Consider cultural and social influences on pain and pain management  - Notify physician/advanced practitioner if interventions unsuccessful or patient reports new pain  Outcome: Progressing     Problem: INFECTION - ADULT  Goal: Absence or prevention of progression during hospitalization  Description: INTERVENTIONS:  - Assess and monitor for signs and symptoms of infection  - Monitor lab/diagnostic results  - Monitor all insertion sites, i.e. indwelling lines, tubes, and drains  - Monitor endotracheal if appropriate and nasal secretions for changes in amount and color  - [place] appropriate cooling/warming therapies per order  - Administer medications as ordered  - Instruct and encourage patient and family to use good hand hygiene technique  - Identify and instruct in appropriate isolation precautions for identified  infection/condition  Outcome: Progressing     Problem: SAFETY ADULT  Goal: Patient will remain free of falls  Description: INTERVENTIONS:  - Educate patient/family on patient safety including physical limitations  - Instruct patient to call for assistance with activity   - Consult OT/PT to assist with strengthening/mobility   - Keep Call bell within reach  - Keep bed low and locked with side rails adjusted as appropriate  - Keep care items and personal belongings within reach  - Initiate and maintain comfort rounds  - Make Fall Risk Sign visible to staff  - Offer Toileting every 2 Hours, in advance of need  - Initiate/Maintain bed/chair alarm  - Obtain necessary fall risk management equipment: non skid socks  - Apply yellow socks and bracelet for high fall risk patients  - Consider moving patient to room near nurses station  Outcome: Progressing  Goal: Maintain or return to baseline ADL function  Description: INTERVENTIONS:  -  Assess patient's ability to carry out ADLs; assess patient's baseline for ADL function and identify physical deficits which impact ability to perform ADLs (bathing, care of mouth/teeth, toileting, grooming, dressing, etc.)  - Assess/evaluate cause of self-care deficits   - Assess range of motion  - Assess patient's mobility; develop plan if impaired  - Assess patient's need for assistive devices and provide as appropriate  - Encourage maximum independence but intervene and supervise when necessary  - Involve family in performance of ADLs  - Assess for home care needs following discharge   - Consider OT consult to assist with ADL evaluation and planning for discharge  - Provide patient education as appropriate  Outcome: Progressing  Goal: Maintains/Returns to pre admission functional level  Description: INTERVENTIONS:  - Perform AM-PAC 6 Click Basic Mobility/ Daily Activity assessment daily.  - Set and communicate daily mobility goal to care team and patient/family/caregiver.   - Collaborate  with rehabilitation services on mobility goals if consulted  - Perform Range of Motion 3 times a day.  - Reposition patient every 2 hours.  - Dangle patient 3 times a day  - Stand patient 3 times a day  - Ambulate patient 3 times a day  - Out of bed to chair 3 times a day   - Out of bed for meals 3 times a day  - Out of bed for toileting  - Record patient progress and toleration of activity level   Outcome: Progressing     Problem: DISCHARGE PLANNING  Goal: Discharge to home or other facility with appropriate resources  Description: INTERVENTIONS:  - Identify barriers to discharge w/patient and caregiver  - Arrange for needed discharge resources and transportation as appropriate  - Identify discharge learning needs (meds, wound care, etc.)  - Arrange for interpretive services to assist at discharge as needed  - Refer to Case Management Department for coordinating discharge planning if the patient needs post-hospital services based on physician/advanced practitioner order or complex needs related to functional status, cognitive ability, or social support system  Outcome: Progressing     Problem: Knowledge Deficit  Goal: Patient/family/caregiver demonstrates understanding of disease process, treatment plan, medications, and discharge instructions  Description: Complete learning assessment and assess knowledge base.  Interventions:  - Provide teaching at level of understanding  - Provide teaching via preferred learning methods  Outcome: Progressing     Problem: Prexisting or High Potential for Compromised Skin Integrity  Goal: Skin integrity is maintained or improved  Description: INTERVENTIONS:  - Identify patients at risk for skin breakdown  - Assess and monitor skin integrity  - Assess and monitor nutrition and hydration status  - Monitor labs   - Assess for incontinence   - Turn and reposition patient  - Assist with mobility/ambulation  - Relieve pressure over bony prominences  - Avoid friction and shearing  -  Provide appropriate hygiene as needed including keeping skin clean and dry  - Evaluate need for skin moisturizer/barrier cream  - Collaborate with interdisciplinary team   - Patient/family teaching  - Consider wound care consult   Outcome: Progressing     Problem: Nutrition/Hydration-ADULT  Goal: Nutrient/Hydration intake appropriate for improving, restoring or maintaining nutritional needs  Description: Monitor and assess patient's nutrition/hydration status for malnutrition. Collaborate with interdisciplinary team and initiate plan and interventions as ordered.  Monitor patient's weight and dietary intake as ordered or per policy. Utilize nutrition screening tool and intervene as necessary. Determine patient's food preferences and provide high-protein, high-caloric foods as appropriate.     INTERVENTIONS:  - Monitor oral intake, urinary output, labs, and treatment plans  - Assess nutrition and hydration status and recommend course of action  - Evaluate amount of meals eaten  - Assist patient with eating if necessary   - Allow adequate time for meals  - Recommend/ encourage appropriate diets, oral nutritional supplements, and vitamin/mineral supplements  - Order, calculate, and assess calorie counts as needed  - Recommend, monitor, and adjust tube feedings and TPN/PPN based on assessed needs  - Assess need for intravenous fluids  - Provide specific nutrition/hydration education as appropriate  - Include patient/family/caregiver in decisions related to nutrition  Outcome: Progressing

## 2025-02-19 NOTE — ASSESSMENT & PLAN NOTE
Protonix 40 mg daily   71 y/o male hx gastric outlet obstruction, pyloric stenosis with g j tube present for malpositioning of gjtube. seen by Dr. Mills in ED  Pt had abdominal XR with contrast and tube in position. Will contact IR in the morning for replacement/repositioning.

## 2025-02-19 NOTE — ASSESSMENT & PLAN NOTE
Improving/Stable    Continue to monitor LFTs as they are continuing to slowly trending downward.  Consider 24-hour urine copper study on outpatient basis.  Continue to avoid hepatotoxins.  Continue with alcohol cessation/abstinence.  Can continue current diet as ordered.

## 2025-02-19 NOTE — ASSESSMENT & PLAN NOTE
Iatrogenic from resuscitation and rhabdomyolysis.  Allow auto diuresis.  Prescribed Lasix at discharge prn leg swelling

## 2025-02-19 NOTE — DISCHARGE INSTR - AVS FIRST PAGE
Can continue with Imodium every 6-8 hours as needed for loose stool/diarrhea at home  Continue with banatrol flakes twice daily in applesauce until stools are to return to normal  Continue to drink at least 30 to 64 ounces of water daily  Encourage complete alcohol cessation  GI is arranging for close outpatient follow-up  Continue daily magnesium supplementation as prescribed.  Nephrology is arranging for outpatient follow-up. They recommend utilizing lasix 40mg daily as needed for leg swelling once once the diarrhea has resolved.   Please follow-up with primary care provider in 1 week for transition of care appointment  Please obtain repeat labs with BMP/magnesium level in 1 week

## 2025-02-20 ENCOUNTER — TELEPHONE (OUTPATIENT)
Dept: GASTROENTEROLOGY | Facility: CLINIC | Age: 50
End: 2025-02-20

## 2025-02-20 LAB
DME PARACHUTE DELIVERY DATE ACTUAL: NORMAL
DME PARACHUTE DELIVERY DATE REQUESTED: NORMAL
DME PARACHUTE ITEM DESCRIPTION: NORMAL
DME PARACHUTE ORDER STATUS: NORMAL
DME PARACHUTE SUPPLIER NAME: NORMAL
DME PARACHUTE SUPPLIER PHONE: NORMAL

## 2025-02-20 NOTE — TELEPHONE ENCOUNTER
Can you please call the patient and schedule him a follow-up office visit in the next 4 to 6 weeks with me in [place] and you can use an urgent slot or new patient's slot if you have to as he is a hospital follow-up that we need to see sooner than later.

## 2025-02-20 NOTE — UTILIZATION REVIEW
NOTIFICATION OF ADMISSION DISCHARGE   This is a Notification of Discharge from [facility]. Please be advised that this patient has been discharge from our facility. Below you will find the admission and discharge date and time including the patient’s disposition.   UTILIZATION REVIEW CONTACT:  Marlon Cavazos  Utilization   Network Utilization Review Department  Phone: [phone] x carefully listen to the prompts. All voicemails are confidential.  Email: [email or web address]     ADMISSION INFORMATION  PRESENTATION DATE: 2/7/2025  4:21 AM  OBERVATION ADMISSION DATE: N/A  INPATIENT ADMISSION DATE: 2/7/25  7:06 AM   DISCHARGE DATE: 2/19/2025  4:15 PM   DISPOSITION:Home/Self Care    Network Utilization Review Department  ATTENTION: Please call with any questions or concerns to [phone] and carefully listen to the prompts so that you are directed to the right person. All voicemails are confidential.   For Discharge needs, contact Care Management DC Support Team at [phone] opt. 2  Send all requests for admission clinical reviews, approved or denied determinations and any other requests to dedicated fax number below belonging to the campus where the patient is receiving treatment. List of dedicated fax numbers for the Facilities:  FACILITY NAME UR FAX NUMBER   ADMISSION DENIALS (Administrative/Medical Necessity) [phone]   DISCHARGE SUPPORT TEAM ([facility]) [phone]   PARENT CHILD HEALTH (Maternity/NICU/Pediatrics) [phone]   [facility] [phone]   [facility] [phone]   [facility] [phone]   [facility] [phone]   [facility] [phone]   [facility] [phone]   [facility] [phone]   [facility] [phone]   [facility]  [facility] [phone]   [facility] [phone]   [facility] [phone]   [facility] [phone]

## 2025-02-22 LAB
EJ AB SER QL: <11 SI
ENA JO1 AB SER QL IB: <11 SI
OJ AB SER QL IB: <11 SI
PL12 AB SER QL IB: <11 SI
PL7 AB SER QL IB: <11 SI
SL AMB MDA-5 AB: <11 SI
SL AMB MI-2 ALPHA AB: <11 SI
SL AMB MI-2 BETA AB: <11 SI
SL AMB NXP-2 AB: <11 SI
SL AMB TIF-1Y AB: <11 SI
SRP AB SERPL QL IB: <11 SI

## 2025-04-03 ENCOUNTER — TELEPHONE (OUTPATIENT)
Dept: PULMONOLOGY | Facility: CLINIC | Age: 50
End: 2025-04-03

## 2025-08-27 ENCOUNTER — DOCTOR'S OFFICE (OUTPATIENT)
Dept: URBAN - NONMETROPOLITAN AREA CLINIC 2 | Facility: CLINIC | Age: 50
Setting detail: OPHTHALMOLOGY
End: 2025-08-27
Payer: COMMERCIAL

## 2025-08-27 ENCOUNTER — RX ONLY (RX ONLY)
Age: 50
End: 2025-08-27

## 2025-08-27 DIAGNOSIS — I63.50: ICD-10-CM

## 2025-08-27 DIAGNOSIS — H53.10: ICD-10-CM

## 2025-08-27 DIAGNOSIS — H25.13: ICD-10-CM

## 2025-08-27 PROCEDURE — 99204 OFFICE O/P NEW MOD 45 MIN: CPT

## 2025-08-27 PROCEDURE — 92083 EXTENDED VISUAL FIELD XM: CPT

## 2025-08-27 ASSESSMENT — CONFRONTATIONAL VISUAL FIELD TEST (CVF)
OS_FINDINGS: FULL
OD_FINDINGS: FULL

## 2025-08-28 ASSESSMENT — REFRACTION_AUTOREFRACTION
OS_AXIS: 087
OD_CYLINDER: -0.75
OD_SPHERE: +1.75
OS_SPHERE: +2.25
OD_AXIS: 084
OS_CYLINDER: -1.25

## 2025-08-28 ASSESSMENT — REFRACTION_MANIFEST
OD_ADD: +2.00
OS_SPHERE: +1.75
OD_SPHERE: +2.00
OS_VA2: 20/30
OD_VA1: 20/30
OD_AXIS: 090
OD_CYLINDER: -0.75
OS_VA1: 20/50
OD_VA2: 20/30
OS_ADD: +2.00
OU_VA: 20/30
OS_AXIS: 090
OS_CYLINDER: -1.00

## 2025-08-28 ASSESSMENT — REFRACTION_CURRENTRX
OD_OVR_VA: 20/
OS_OVR_VA: 20/

## 2025-08-28 ASSESSMENT — VISUAL ACUITY
OS_BCVA: 20/40
OD_BCVA: 20/40-2